# Patient Record
Sex: MALE | Race: WHITE | Employment: FULL TIME | URBAN - METROPOLITAN AREA
[De-identification: names, ages, dates, MRNs, and addresses within clinical notes are randomized per-mention and may not be internally consistent; named-entity substitution may affect disease eponyms.]

---

## 2017-02-03 ENCOUNTER — TELEPHONE (OUTPATIENT)
Dept: FAMILY MEDICINE CLINIC | Age: 62
End: 2017-02-03

## 2017-02-03 DIAGNOSIS — M25.539 PAIN IN WRIST, UNSPECIFIED LATERALITY: Primary | ICD-10-CM

## 2017-02-03 DIAGNOSIS — M67.40 GANGLION CYST: ICD-10-CM

## 2017-02-06 DIAGNOSIS — M25.531 PAIN IN BOTH WRISTS: Primary | ICD-10-CM

## 2017-02-06 DIAGNOSIS — M67.40 GANGLION CYST: ICD-10-CM

## 2017-02-06 DIAGNOSIS — M25.532 PAIN IN BOTH WRISTS: Primary | ICD-10-CM

## 2017-02-15 ENCOUNTER — OFFICE VISIT (OUTPATIENT)
Dept: ORTHOPEDIC SURGERY | Age: 62
End: 2017-02-15

## 2017-02-15 VITALS — BODY MASS INDEX: 27.34 KG/M2 | HEIGHT: 74 IN | WEIGHT: 213 LBS

## 2017-02-15 DIAGNOSIS — M19.049 CMC ARTHRITIS: ICD-10-CM

## 2017-02-15 DIAGNOSIS — G56.03 CARPAL TUNNEL SYNDROME, BILATERAL: ICD-10-CM

## 2017-02-15 DIAGNOSIS — M79.642 HAND PAIN, LEFT: ICD-10-CM

## 2017-02-15 DIAGNOSIS — M19.132 SLAC (SCAPHOLUNATE ADVANCED COLLAPSE) OF WRIST, LEFT: ICD-10-CM

## 2017-02-15 DIAGNOSIS — M79.641 RIGHT HAND PAIN: Primary | ICD-10-CM

## 2017-02-15 PROBLEM — M19.139 SLAC (SCAPHOLUNATE ADVANCED COLLAPSE) OF WRIST: Status: ACTIVE | Noted: 2017-02-15

## 2017-02-15 PROCEDURE — 73130 X-RAY EXAM OF HAND: CPT | Performed by: ORTHOPAEDIC SURGERY

## 2017-02-15 PROCEDURE — 99204 OFFICE O/P NEW MOD 45 MIN: CPT | Performed by: ORTHOPAEDIC SURGERY

## 2017-02-24 ENCOUNTER — OFFICE VISIT (OUTPATIENT)
Dept: ORTHOPEDIC SURGERY | Age: 62
End: 2017-02-24

## 2017-02-24 DIAGNOSIS — G56.03 BILATERAL CARPAL TUNNEL SYNDROME: Primary | ICD-10-CM

## 2017-02-24 PROCEDURE — 95909 NRV CNDJ TST 5-6 STUDIES: CPT | Performed by: PHYSICAL MEDICINE & REHABILITATION

## 2017-02-24 PROCEDURE — 95886 MUSC TEST DONE W/N TEST COMP: CPT | Performed by: PHYSICAL MEDICINE & REHABILITATION

## 2017-03-01 ENCOUNTER — OFFICE VISIT (OUTPATIENT)
Dept: ORTHOPEDIC SURGERY | Age: 62
End: 2017-03-01

## 2017-03-01 VITALS
BODY MASS INDEX: 27.59 KG/M2 | SYSTOLIC BLOOD PRESSURE: 129 MMHG | HEART RATE: 70 BPM | DIASTOLIC BLOOD PRESSURE: 78 MMHG | HEIGHT: 74 IN | WEIGHT: 215 LBS

## 2017-03-01 DIAGNOSIS — M19.039 WRIST ARTHRITIS: ICD-10-CM

## 2017-03-01 DIAGNOSIS — G56.03 CARPAL TUNNEL SYNDROME, BILATERAL: Primary | ICD-10-CM

## 2017-03-01 PROCEDURE — 99213 OFFICE O/P EST LOW 20 MIN: CPT | Performed by: ORTHOPAEDIC SURGERY

## 2017-03-02 ENCOUNTER — TELEPHONE (OUTPATIENT)
Dept: ORTHOPEDIC SURGERY | Age: 62
End: 2017-03-02

## 2017-03-03 ENCOUNTER — OFFICE VISIT (OUTPATIENT)
Dept: FAMILY MEDICINE CLINIC | Age: 62
End: 2017-03-03

## 2017-03-03 VITALS
HEIGHT: 73 IN | HEART RATE: 60 BPM | TEMPERATURE: 98.4 F | WEIGHT: 212 LBS | RESPIRATION RATE: 16 BRPM | OXYGEN SATURATION: 98 % | SYSTOLIC BLOOD PRESSURE: 122 MMHG | DIASTOLIC BLOOD PRESSURE: 79 MMHG | BODY MASS INDEX: 28.1 KG/M2

## 2017-03-03 DIAGNOSIS — M79.641 RIGHT HAND PAIN: Primary | ICD-10-CM

## 2017-03-03 DIAGNOSIS — J30.89 PERENNIAL ALLERGIC RHINITIS, UNSPECIFIED ALLERGIC RHINITIS TRIGGER: ICD-10-CM

## 2017-03-03 DIAGNOSIS — Z01.818 PREOP EXAMINATION: ICD-10-CM

## 2017-03-03 DIAGNOSIS — M79.642 LEFT HAND PAIN: ICD-10-CM

## 2017-03-03 DIAGNOSIS — G56.01 RIGHT CARPAL TUNNEL SYNDROME: ICD-10-CM

## 2017-03-03 PROCEDURE — 93000 ELECTROCARDIOGRAM COMPLETE: CPT | Performed by: NURSE PRACTITIONER

## 2017-03-03 PROCEDURE — 99243 OFF/OP CNSLTJ NEW/EST LOW 30: CPT | Performed by: NURSE PRACTITIONER

## 2017-03-03 ASSESSMENT — PATIENT HEALTH QUESTIONNAIRE - PHQ9
SUM OF ALL RESPONSES TO PHQ9 QUESTIONS 1 & 2: 0
SUM OF ALL RESPONSES TO PHQ QUESTIONS 1-9: 0
2. FEELING DOWN, DEPRESSED OR HOPELESS: 0
1. LITTLE INTEREST OR PLEASURE IN DOING THINGS: 0

## 2017-03-03 ASSESSMENT — ENCOUNTER SYMPTOMS
CONSTIPATION: 0
EYE ITCHING: 0
CHEST TIGHTNESS: 0
WHEEZING: 0
TROUBLE SWALLOWING: 0
SORE THROAT: 0
EYE REDNESS: 0
ABDOMINAL PAIN: 0
DIARRHEA: 0
COLOR CHANGE: 0
NAUSEA: 0
COUGH: 0
SHORTNESS OF BREATH: 0
SINUS PRESSURE: 1

## 2017-03-14 ENCOUNTER — HOSPITAL ENCOUNTER (OUTPATIENT)
Dept: SURGERY | Age: 62
Discharge: HOME | End: 2017-03-14
Attending: ORTHOPAEDIC SURGERY | Admitting: ORTHOPAEDIC SURGERY

## 2017-03-14 VITALS
TEMPERATURE: 97.5 F | BODY MASS INDEX: 27.59 KG/M2 | WEIGHT: 215 LBS | OXYGEN SATURATION: 98 % | RESPIRATION RATE: 18 BRPM | SYSTOLIC BLOOD PRESSURE: 126 MMHG | DIASTOLIC BLOOD PRESSURE: 84 MMHG | HEART RATE: 53 BPM | HEIGHT: 74 IN

## 2017-03-14 RX ORDER — HYDRALAZINE HYDROCHLORIDE 20 MG/ML
5 INJECTION INTRAMUSCULAR; INTRAVENOUS
Status: DISCONTINUED | OUTPATIENT
Start: 2017-03-14 | End: 2017-03-15 | Stop reason: HOSPADM

## 2017-03-14 RX ORDER — SODIUM CHLORIDE, SODIUM LACTATE, POTASSIUM CHLORIDE, CALCIUM CHLORIDE 600; 310; 30; 20 MG/100ML; MG/100ML; MG/100ML; MG/100ML
INJECTION, SOLUTION INTRAVENOUS CONTINUOUS
Status: DISCONTINUED | OUTPATIENT
Start: 2017-03-14 | End: 2017-03-15 | Stop reason: HOSPADM

## 2017-03-14 RX ORDER — MEPERIDINE HYDROCHLORIDE 50 MG/ML
12.5 INJECTION INTRAMUSCULAR; INTRAVENOUS; SUBCUTANEOUS EVERY 5 MIN PRN
Status: DISCONTINUED | OUTPATIENT
Start: 2017-03-14 | End: 2017-03-15 | Stop reason: HOSPADM

## 2017-03-14 RX ORDER — LABETALOL HYDROCHLORIDE 5 MG/ML
5 INJECTION, SOLUTION INTRAVENOUS EVERY 10 MIN PRN
Status: DISCONTINUED | OUTPATIENT
Start: 2017-03-14 | End: 2017-03-15 | Stop reason: HOSPADM

## 2017-03-14 RX ORDER — OXYCODONE HYDROCHLORIDE AND ACETAMINOPHEN 5; 325 MG/1; MG/1
2 TABLET ORAL PRN
Status: ACTIVE | OUTPATIENT
Start: 2017-03-14 | End: 2017-03-14

## 2017-03-14 RX ORDER — DIPHENHYDRAMINE HYDROCHLORIDE 50 MG/ML
12.5 INJECTION INTRAMUSCULAR; INTRAVENOUS
Status: ACTIVE | OUTPATIENT
Start: 2017-03-14 | End: 2017-03-14

## 2017-03-14 RX ORDER — MORPHINE SULFATE 10 MG/ML
2 INJECTION, SOLUTION INTRAMUSCULAR; INTRAVENOUS EVERY 5 MIN PRN
Status: DISCONTINUED | OUTPATIENT
Start: 2017-03-14 | End: 2017-03-15 | Stop reason: HOSPADM

## 2017-03-14 RX ORDER — OXYCODONE HYDROCHLORIDE AND ACETAMINOPHEN 5; 325 MG/1; MG/1
1 TABLET ORAL PRN
Status: ACTIVE | OUTPATIENT
Start: 2017-03-14 | End: 2017-03-14

## 2017-03-14 RX ORDER — ONDANSETRON 2 MG/ML
4 INJECTION INTRAMUSCULAR; INTRAVENOUS
Status: ACTIVE | OUTPATIENT
Start: 2017-03-14 | End: 2017-03-14

## 2017-03-14 RX ORDER — MORPHINE SULFATE 2 MG/ML
1 INJECTION, SOLUTION INTRAMUSCULAR; INTRAVENOUS EVERY 5 MIN PRN
Status: DISCONTINUED | OUTPATIENT
Start: 2017-03-14 | End: 2017-03-15 | Stop reason: HOSPADM

## 2017-03-14 RX ADMIN — SODIUM CHLORIDE, SODIUM LACTATE, POTASSIUM CHLORIDE, CALCIUM CHLORIDE: 600; 310; 30; 20 INJECTION, SOLUTION INTRAVENOUS at 10:40

## 2017-03-14 ASSESSMENT — PAIN SCALES - GENERAL
PAINLEVEL_OUTOF10: 0

## 2017-03-14 ASSESSMENT — PAIN - FUNCTIONAL ASSESSMENT: PAIN_FUNCTIONAL_ASSESSMENT: 0-10

## 2017-03-22 ENCOUNTER — OFFICE VISIT (OUTPATIENT)
Dept: ORTHOPEDIC SURGERY | Age: 62
End: 2017-03-22

## 2017-03-22 VITALS — BODY MASS INDEX: 27.59 KG/M2 | WEIGHT: 215 LBS | HEIGHT: 74 IN

## 2017-03-22 DIAGNOSIS — G56.03 CARPAL TUNNEL SYNDROME, BILATERAL: Primary | ICD-10-CM

## 2017-03-22 DIAGNOSIS — M67.432 GANGLION CYST OF WRIST, LEFT: ICD-10-CM

## 2017-03-22 PROCEDURE — 99213 OFFICE O/P EST LOW 20 MIN: CPT | Performed by: ORTHOPAEDIC SURGERY

## 2017-03-29 ENCOUNTER — TELEPHONE (OUTPATIENT)
Dept: ORTHOPEDIC SURGERY | Age: 62
End: 2017-03-29

## 2017-04-11 ENCOUNTER — HOSPITAL ENCOUNTER (OUTPATIENT)
Dept: SURGERY | Age: 62
Discharge: HOME | End: 2017-04-11
Attending: ORTHOPAEDIC SURGERY | Admitting: ORTHOPAEDIC SURGERY

## 2017-04-11 VITALS
HEART RATE: 59 BPM | TEMPERATURE: 97.8 F | HEIGHT: 74 IN | WEIGHT: 220 LBS | SYSTOLIC BLOOD PRESSURE: 135 MMHG | OXYGEN SATURATION: 98 % | BODY MASS INDEX: 28.23 KG/M2 | RESPIRATION RATE: 15 BRPM | DIASTOLIC BLOOD PRESSURE: 92 MMHG

## 2017-04-11 RX ORDER — ONDANSETRON 2 MG/ML
4 INJECTION INTRAMUSCULAR; INTRAVENOUS EVERY 30 MIN PRN
Status: DISCONTINUED | OUTPATIENT
Start: 2017-04-11 | End: 2017-04-12 | Stop reason: HOSPADM

## 2017-04-11 RX ORDER — DIPHENHYDRAMINE HYDROCHLORIDE 50 MG/ML
6.25 INJECTION INTRAMUSCULAR; INTRAVENOUS
Status: ACTIVE | OUTPATIENT
Start: 2017-04-11 | End: 2017-04-11

## 2017-04-11 RX ORDER — OXYCODONE HYDROCHLORIDE AND ACETAMINOPHEN 5; 325 MG/1; MG/1
1 TABLET ORAL PRN
Status: ACTIVE | OUTPATIENT
Start: 2017-04-11 | End: 2017-04-11

## 2017-04-11 RX ORDER — OXYCODONE HYDROCHLORIDE AND ACETAMINOPHEN 5; 325 MG/1; MG/1
2 TABLET ORAL PRN
Status: ACTIVE | OUTPATIENT
Start: 2017-04-11 | End: 2017-04-11

## 2017-04-11 RX ORDER — MEPERIDINE HYDROCHLORIDE 50 MG/ML
12.5 INJECTION INTRAMUSCULAR; INTRAVENOUS; SUBCUTANEOUS EVERY 5 MIN PRN
Status: DISCONTINUED | OUTPATIENT
Start: 2017-04-11 | End: 2017-04-12 | Stop reason: HOSPADM

## 2017-04-11 RX ORDER — HYDRALAZINE HYDROCHLORIDE 20 MG/ML
5 INJECTION INTRAMUSCULAR; INTRAVENOUS EVERY 30 MIN PRN
Status: DISCONTINUED | OUTPATIENT
Start: 2017-04-11 | End: 2017-04-12 | Stop reason: HOSPADM

## 2017-04-11 RX ORDER — LABETALOL HYDROCHLORIDE 5 MG/ML
5 INJECTION, SOLUTION INTRAVENOUS
Status: DISCONTINUED | OUTPATIENT
Start: 2017-04-11 | End: 2017-04-12 | Stop reason: HOSPADM

## 2017-04-11 RX ORDER — SODIUM CHLORIDE, SODIUM LACTATE, POTASSIUM CHLORIDE, CALCIUM CHLORIDE 600; 310; 30; 20 MG/100ML; MG/100ML; MG/100ML; MG/100ML
INJECTION, SOLUTION INTRAVENOUS CONTINUOUS
Status: DISCONTINUED | OUTPATIENT
Start: 2017-04-11 | End: 2017-04-12 | Stop reason: HOSPADM

## 2017-04-11 RX ADMIN — SODIUM CHLORIDE, SODIUM LACTATE, POTASSIUM CHLORIDE, CALCIUM CHLORIDE: 600; 310; 30; 20 INJECTION, SOLUTION INTRAVENOUS at 09:52

## 2017-04-11 ASSESSMENT — PAIN - FUNCTIONAL ASSESSMENT: PAIN_FUNCTIONAL_ASSESSMENT: 0-10

## 2017-04-19 ENCOUNTER — OFFICE VISIT (OUTPATIENT)
Dept: ORTHOPEDIC SURGERY | Age: 62
End: 2017-04-19

## 2017-04-19 ENCOUNTER — HOSPITAL ENCOUNTER (OUTPATIENT)
Dept: OCCUPATIONAL THERAPY | Age: 62
Discharge: HOME | End: 2017-04-30
Admitting: ORTHOPAEDIC SURGERY

## 2017-04-19 VITALS
WEIGHT: 220.02 LBS | HEART RATE: 69 BPM | DIASTOLIC BLOOD PRESSURE: 90 MMHG | BODY MASS INDEX: 28.24 KG/M2 | SYSTOLIC BLOOD PRESSURE: 137 MMHG | HEIGHT: 74 IN

## 2017-04-19 DIAGNOSIS — G56.02 CARPAL TUNNEL SYNDROME ON LEFT: ICD-10-CM

## 2017-04-19 DIAGNOSIS — M67.432 GANGLION OF WRIST, LEFT: Primary | ICD-10-CM

## 2017-04-19 PROCEDURE — 99024 POSTOP FOLLOW-UP VISIT: CPT | Performed by: ORTHOPAEDIC SURGERY

## 2017-04-19 RX ORDER — CEPHALEXIN 500 MG/1
500 CAPSULE ORAL 4 TIMES DAILY
Qty: 28 CAPSULE | Refills: 0 | Status: SHIPPED | OUTPATIENT
Start: 2017-04-19 | End: 2017-06-12 | Stop reason: ALTCHOICE

## 2017-04-24 ENCOUNTER — OFFICE VISIT (OUTPATIENT)
Dept: ORTHOPEDIC SURGERY | Age: 62
End: 2017-04-24

## 2017-04-24 DIAGNOSIS — G56.03 CARPAL TUNNEL SYNDROME, BILATERAL: Primary | ICD-10-CM

## 2017-04-24 PROCEDURE — 99024 POSTOP FOLLOW-UP VISIT: CPT | Performed by: ORTHOPAEDIC SURGERY

## 2017-05-15 ENCOUNTER — OFFICE VISIT (OUTPATIENT)
Dept: ORTHOPEDIC SURGERY | Age: 62
End: 2017-05-15

## 2017-05-15 VITALS — HEIGHT: 74 IN | BODY MASS INDEX: 28.24 KG/M2 | WEIGHT: 220.02 LBS

## 2017-05-15 DIAGNOSIS — M19.132 SLAC (SCAPHOLUNATE ADVANCED COLLAPSE) WRIST, LEFT: Primary | ICD-10-CM

## 2017-05-15 PROCEDURE — 99213 OFFICE O/P EST LOW 20 MIN: CPT | Performed by: ORTHOPAEDIC SURGERY

## 2017-06-09 DIAGNOSIS — Z00.00 ROUTINE MEDICAL EXAM: ICD-10-CM

## 2017-06-09 DIAGNOSIS — Z12.5 SCREENING FOR PROSTATE CANCER: ICD-10-CM

## 2017-06-09 DIAGNOSIS — Z12.5 SCREENING FOR PROSTATE CANCER: Primary | ICD-10-CM

## 2017-06-09 LAB
A/G RATIO: 1.4 (ref 1.1–2.2)
ALBUMIN SERPL-MCNC: 4.1 G/DL (ref 3.4–5)
ALP BLD-CCNC: 85 U/L (ref 40–129)
ALT SERPL-CCNC: 7 U/L (ref 10–40)
ANION GAP SERPL CALCULATED.3IONS-SCNC: 15 MMOL/L (ref 3–16)
AST SERPL-CCNC: 15 U/L (ref 15–37)
BASOPHILS ABSOLUTE: 0 K/UL (ref 0–0.2)
BASOPHILS RELATIVE PERCENT: 0.8 %
BILIRUB SERPL-MCNC: 0.4 MG/DL (ref 0–1)
BILIRUBIN DIRECT: <0.2 MG/DL (ref 0–0.3)
BILIRUBIN, INDIRECT: NORMAL MG/DL (ref 0–1)
BUN BLDV-MCNC: 17 MG/DL (ref 7–20)
CALCIUM SERPL-MCNC: 8.7 MG/DL (ref 8.3–10.6)
CHLORIDE BLD-SCNC: 104 MMOL/L (ref 99–110)
CHOLESTEROL, TOTAL: 188 MG/DL (ref 0–199)
CO2: 26 MMOL/L (ref 21–32)
CREAT SERPL-MCNC: 0.9 MG/DL (ref 0.8–1.3)
EOSINOPHILS ABSOLUTE: 0.2 K/UL (ref 0–0.6)
EOSINOPHILS RELATIVE PERCENT: 4.1 %
GFR AFRICAN AMERICAN: >60
GFR NON-AFRICAN AMERICAN: >60
GLOBULIN: 2.9 G/DL
GLUCOSE BLD-MCNC: 91 MG/DL (ref 70–99)
HCT VFR BLD CALC: 47 % (ref 40.5–52.5)
HDLC SERPL-MCNC: 63 MG/DL (ref 40–60)
HEMOGLOBIN: 15.5 G/DL (ref 13.5–17.5)
LDL CHOLESTEROL CALCULATED: 106 MG/DL
LYMPHOCYTES ABSOLUTE: 1.7 K/UL (ref 1–5.1)
LYMPHOCYTES RELATIVE PERCENT: 33.9 %
MCH RBC QN AUTO: 30.6 PG (ref 26–34)
MCHC RBC AUTO-ENTMCNC: 33 G/DL (ref 31–36)
MCV RBC AUTO: 92.8 FL (ref 80–100)
MONOCYTES ABSOLUTE: 0.4 K/UL (ref 0–1.3)
MONOCYTES RELATIVE PERCENT: 7.9 %
NEUTROPHILS ABSOLUTE: 2.7 K/UL (ref 1.7–7.7)
NEUTROPHILS RELATIVE PERCENT: 53.3 %
PDW BLD-RTO: 13 % (ref 12.4–15.4)
PLATELET # BLD: 332 K/UL (ref 135–450)
PMV BLD AUTO: 7.7 FL (ref 5–10.5)
POTASSIUM SERPL-SCNC: 4.4 MMOL/L (ref 3.5–5.1)
PROSTATE SPECIFIC ANTIGEN: 0.72 NG/ML (ref 0–4)
RBC # BLD: 5.06 M/UL (ref 4.2–5.9)
SODIUM BLD-SCNC: 145 MMOL/L (ref 136–145)
TOTAL PROTEIN: 7 G/DL (ref 6.4–8.2)
TRIGL SERPL-MCNC: 95 MG/DL (ref 0–150)
TSH REFLEX: 2.4 UIU/ML (ref 0.27–4.2)
VITAMIN D 25-HYDROXY: 54.6 NG/ML
VLDLC SERPL CALC-MCNC: 19 MG/DL
WBC # BLD: 5.2 K/UL (ref 4–11)

## 2017-06-12 ENCOUNTER — OFFICE VISIT (OUTPATIENT)
Dept: FAMILY MEDICINE CLINIC | Age: 62
End: 2017-06-12

## 2017-06-12 VITALS
HEART RATE: 56 BPM | BODY MASS INDEX: 29.12 KG/M2 | TEMPERATURE: 98.4 F | SYSTOLIC BLOOD PRESSURE: 134 MMHG | HEIGHT: 72 IN | DIASTOLIC BLOOD PRESSURE: 89 MMHG | WEIGHT: 215 LBS | RESPIRATION RATE: 16 BRPM

## 2017-06-12 DIAGNOSIS — Z00.00 ROUTINE GENERAL MEDICAL EXAMINATION AT A HEALTH CARE FACILITY: Primary | ICD-10-CM

## 2017-06-12 LAB
BILIRUBIN, POC: NORMAL
BLOOD URINE, POC: NORMAL
CLARITY, POC: NORMAL
COLOR, POC: NORMAL
GLUCOSE URINE, POC: NORMAL
KETONES, POC: NORMAL
LEUKOCYTE EST, POC: NORMAL
NITRITE, POC: NORMAL
PH, POC: 7
PROTEIN, POC: NORMAL
SPECIFIC GRAVITY, POC: 1.01
UROBILINOGEN, POC: NORMAL

## 2017-06-12 PROCEDURE — 81003 URINALYSIS AUTO W/O SCOPE: CPT | Performed by: FAMILY MEDICINE

## 2017-06-12 PROCEDURE — 99396 PREV VISIT EST AGE 40-64: CPT | Performed by: FAMILY MEDICINE

## 2017-06-12 ASSESSMENT — ENCOUNTER SYMPTOMS
PHOTOPHOBIA: 0
CONSTIPATION: 0
SORE THROAT: 0
BACK PAIN: 0
BLOOD IN STOOL: 0
TROUBLE SWALLOWING: 0
ABDOMINAL PAIN: 0
WHEEZING: 0
SINUS PRESSURE: 0
SHORTNESS OF BREATH: 0
DIARRHEA: 0
COUGH: 0

## 2017-08-21 ENCOUNTER — OFFICE VISIT (OUTPATIENT)
Dept: ORTHOPEDIC SURGERY | Age: 62
End: 2017-08-21

## 2017-08-21 VITALS — WEIGHT: 214.95 LBS | HEIGHT: 72 IN | BODY MASS INDEX: 29.11 KG/M2

## 2017-08-21 DIAGNOSIS — M25.532 LEFT WRIST PAIN: Primary | ICD-10-CM

## 2017-08-21 DIAGNOSIS — M19.132 SLAC (SCAPHOLUNATE ADVANCED COLLAPSE) OF WRIST, LEFT: ICD-10-CM

## 2017-08-21 PROCEDURE — 73110 X-RAY EXAM OF WRIST: CPT | Performed by: ORTHOPAEDIC SURGERY

## 2017-08-21 PROCEDURE — 99213 OFFICE O/P EST LOW 20 MIN: CPT | Performed by: ORTHOPAEDIC SURGERY

## 2017-11-24 ENCOUNTER — TELEPHONE (OUTPATIENT)
Dept: ORTHOPEDIC SURGERY | Age: 62
End: 2017-11-24

## 2017-11-28 ENCOUNTER — OFFICE VISIT (OUTPATIENT)
Dept: FAMILY MEDICINE CLINIC | Age: 62
End: 2017-11-28

## 2017-11-28 VITALS
HEART RATE: 56 BPM | WEIGHT: 212 LBS | TEMPERATURE: 98.6 F | RESPIRATION RATE: 16 BRPM | SYSTOLIC BLOOD PRESSURE: 133 MMHG | DIASTOLIC BLOOD PRESSURE: 90 MMHG | BODY MASS INDEX: 28.71 KG/M2

## 2017-11-28 DIAGNOSIS — M19.032 OSTEOARTHRITIS OF LEFT WRIST, UNSPECIFIED OSTEOARTHRITIS TYPE: Primary | ICD-10-CM

## 2017-11-28 DIAGNOSIS — Z01.818 PRE-OP EXAM: ICD-10-CM

## 2017-11-28 PROCEDURE — 99214 OFFICE O/P EST MOD 30 MIN: CPT | Performed by: FAMILY MEDICINE

## 2017-11-28 PROCEDURE — 93000 ELECTROCARDIOGRAM COMPLETE: CPT | Performed by: FAMILY MEDICINE

## 2017-11-28 ASSESSMENT — ENCOUNTER SYMPTOMS
SHORTNESS OF BREATH: 0
ABDOMINAL PAIN: 0
TROUBLE SWALLOWING: 0
BACK PAIN: 0
SORE THROAT: 0
PHOTOPHOBIA: 0
SINUS PRESSURE: 0
CONSTIPATION: 0
WHEEZING: 0
DIARRHEA: 0
COUGH: 0
BLOOD IN STOOL: 0

## 2017-11-28 NOTE — PROGRESS NOTES
no distension, no abdominal bruit and no mass. There is no hepatosplenomegaly. There is no tenderness. Musculoskeletal: He exhibits no edema. Left wrist pain and tenderness over carpel bones. Lymphadenopathy:     He has no cervical adenopathy. Neurological: He is alert and oriented to person, place, and time. No cranial nerve deficit. Skin: Skin is warm and dry. Psychiatric: He has a normal mood and affect. His behavior is normal. Judgment and thought content normal.   Vitals reviewed. Assessment:        Pre-op exam    ligament injury wrist        Plan:      EKG OK      Medically stable. OK for the surgery        Prior to Visit Medications    Medication Sig Taking? Authorizing Provider   LUTEIN PO Take by mouth Yes Historical Provider, MD   Multiple Vitamins-Minerals (VITEYES AREDS FORMULA PO) Take by mouth Yes Historical Provider, MD   Omega-3 Fatty Acids (OMEGA 3 PO) Take by mouth daily  Yes Historical Provider, MD   calcium carbonate (OSCAL) 500 MG TABS tablet Take 500 mg by mouth daily.  Yes Historical Provider, MD   Glucosamine-Chondroitin (GLUCOSAMINE CHONDR COMPLEX PO) Take by mouth daily  Yes Historical Provider, MD

## 2017-12-01 ENCOUNTER — HOSPITAL ENCOUNTER (OUTPATIENT)
Dept: OCCUPATIONAL THERAPY | Age: 62
Discharge: HOME | End: 2017-12-31
Attending: ORTHOPAEDIC SURGERY | Admitting: ORTHOPAEDIC SURGERY

## 2017-12-12 ENCOUNTER — HOSPITAL ENCOUNTER (OUTPATIENT)
Dept: SURGERY | Age: 62
Discharge: HOME | End: 2017-12-12
Attending: ORTHOPAEDIC SURGERY | Admitting: ORTHOPAEDIC SURGERY

## 2017-12-12 VITALS
WEIGHT: 215 LBS | RESPIRATION RATE: 15 BRPM | BODY MASS INDEX: 27.59 KG/M2 | SYSTOLIC BLOOD PRESSURE: 146 MMHG | HEART RATE: 53 BPM | OXYGEN SATURATION: 98 % | DIASTOLIC BLOOD PRESSURE: 91 MMHG | TEMPERATURE: 97.4 F | HEIGHT: 74 IN

## 2017-12-12 RX ORDER — SODIUM CHLORIDE, SODIUM LACTATE, POTASSIUM CHLORIDE, CALCIUM CHLORIDE 600; 310; 30; 20 MG/100ML; MG/100ML; MG/100ML; MG/100ML
INJECTION, SOLUTION INTRAVENOUS CONTINUOUS
Status: DISCONTINUED | OUTPATIENT
Start: 2017-12-12 | End: 2017-12-13 | Stop reason: HOSPADM

## 2017-12-12 RX ORDER — HYDRALAZINE HYDROCHLORIDE 20 MG/ML
5 INJECTION INTRAMUSCULAR; INTRAVENOUS EVERY 10 MIN PRN
Status: DISCONTINUED | OUTPATIENT
Start: 2017-12-12 | End: 2017-12-13 | Stop reason: HOSPADM

## 2017-12-12 RX ORDER — ONDANSETRON 2 MG/ML
4 INJECTION INTRAMUSCULAR; INTRAVENOUS EVERY 10 MIN PRN
Status: DISCONTINUED | OUTPATIENT
Start: 2017-12-12 | End: 2017-12-13 | Stop reason: HOSPADM

## 2017-12-12 RX ORDER — LABETALOL HYDROCHLORIDE 5 MG/ML
5 INJECTION, SOLUTION INTRAVENOUS EVERY 10 MIN PRN
Status: DISCONTINUED | OUTPATIENT
Start: 2017-12-12 | End: 2017-12-13 | Stop reason: HOSPADM

## 2017-12-12 RX ORDER — MIDAZOLAM HYDROCHLORIDE 1 MG/ML
INJECTION INTRAMUSCULAR; INTRAVENOUS
Status: DISPENSED
Start: 2017-12-12 | End: 2017-12-12

## 2017-12-12 RX ORDER — OXYCODONE HYDROCHLORIDE AND ACETAMINOPHEN 5; 325 MG/1; MG/1
1 TABLET ORAL PRN
Status: ACTIVE | OUTPATIENT
Start: 2017-12-12 | End: 2017-12-12

## 2017-12-12 RX ORDER — MEPERIDINE HYDROCHLORIDE 50 MG/ML
12.5 INJECTION INTRAMUSCULAR; INTRAVENOUS; SUBCUTANEOUS EVERY 5 MIN PRN
Status: DISCONTINUED | OUTPATIENT
Start: 2017-12-12 | End: 2017-12-13 | Stop reason: HOSPADM

## 2017-12-12 RX ORDER — OXYCODONE HYDROCHLORIDE AND ACETAMINOPHEN 5; 325 MG/1; MG/1
2 TABLET ORAL PRN
Status: ACTIVE | OUTPATIENT
Start: 2017-12-12 | End: 2017-12-12

## 2017-12-12 RX ADMIN — SODIUM CHLORIDE, SODIUM LACTATE, POTASSIUM CHLORIDE, CALCIUM CHLORIDE: 600; 310; 30; 20 INJECTION, SOLUTION INTRAVENOUS at 06:15

## 2017-12-12 ASSESSMENT — PAIN SCALES - GENERAL
PAINLEVEL_OUTOF10: 3
PAINLEVEL_OUTOF10: 0

## 2017-12-12 ASSESSMENT — PAIN - FUNCTIONAL ASSESSMENT: PAIN_FUNCTIONAL_ASSESSMENT: 0-10

## 2017-12-12 ASSESSMENT — PAIN DESCRIPTION - PAIN TYPE: TYPE: SURGICAL PAIN

## 2017-12-12 ASSESSMENT — PAIN DESCRIPTION - DESCRIPTORS: DESCRIPTORS: DISCOMFORT

## 2017-12-12 ASSESSMENT — PAIN DESCRIPTION - ORIENTATION: ORIENTATION: LEFT

## 2017-12-12 ASSESSMENT — PAIN DESCRIPTION - LOCATION: LOCATION: HAND

## 2017-12-12 NOTE — ANESTHESIA PRE-OP
Department of Anesthesiology  Preprocedure Note       Name:  Lang Gong   Age:  58 y.o.  :  1955                                          MRN:  [id number]         Date:  2017      Surgeon:    Procedure:    Medications prior to admission:   Prior to Admission medications    Medication Sig Start Date End Date Taking? Authorizing Provider   LUTEIN PO Take by mouth    Historical Provider, MD   Multiple Vitamins-Minerals (VITEYES AREDS FORMULA PO) Take by mouth    Historical Provider, MD   Omega-3 Fatty Acids (OMEGA 3 PO) Take by mouth daily     Historical Provider, MD   calcium carbonate (OSCAL) 500 MG TABS tablet Take 500 mg by mouth daily. Historical Provider, MD   Glucosamine-Chondroitin (GLUCOSAMINE CHONDR COMPLEX PO) Take by mouth daily     Historical Provider, MD       Current medications:    Current Outpatient Prescriptions   Medication Sig Dispense Refill    LUTEIN PO Take by mouth      Multiple Vitamins-Minerals (VITEYES AREDS FORMULA PO) Take by mouth      Omega-3 Fatty Acids (OMEGA 3 PO) Take by mouth daily       calcium carbonate (OSCAL) 500 MG TABS tablet Take 500 mg by mouth daily.  Glucosamine-Chondroitin (GLUCOSAMINE CHONDR COMPLEX PO) Take by mouth daily        No current facility-administered medications for this encounter. Allergies:     Allergies   Allergen Reactions    Naprosyn [Naproxen] Rash       Problem List:    Patient Active Problem List   Diagnosis Code    Pain in joint, lower leg left M25.569    Pain in joint, shoulder region left M25.519    Neck pain M54.2    Neck arthritis C5 6 and 7 M46.92    Tendinitis of left rotator cuff M75.82    Left rotator cuff tear M75.102    Status post right hip replacement  Z96.641    Left knee DJD M17.12    Chondromalacia patellae of left knee M22.42    Arthritis of left acromioclavicular joint M19.012    Tear of lateral meniscus of left knee S83.282A    Acquired valgus deformity left knee M21.069    Tear of medial meniscus of left knee S83.242A    Biceps tendinitis on left M75.22    Labral tear of left shoulder S43.439A    S/P left knee arthroscopy, chondroplasty, synovectomy, removal loose bodies, and partial medial and lateral meniscectomy 8/20/2014 Z98.890    S/P left rotator cuff repair, arthroscopy, debridement of rotator cuff, Taylor, Neer, and biceps tenotomy 10/28/2014 Z98.890    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    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    Carpal tunnel syndrome, bilateral G56.03    Wrist arthritis M19.039    Perennial allergic rhinitis J30.89       Past Medical History:        Diagnosis Date    Allergic rhinitis     Arthritis     Osteoarthritis        Past Surgical History:        Procedure Laterality Date    BONE GRAFT      rt wrist  age 27   [facility] [address] Right 03/14/2017    CARPAL TUNNEL RELEASE Left 04/11/2017    with excision volar wrist ganglion    COLONOSCOPY  2008    no polyps    JOINT REPLACEMENT  2008    rt total hip    KNEE ARTHROSCOPY Left 8/20/2014    LEFT KNEE ARTHROSCOPY, CHONDROPLASTY, SYNOVECTOMY, PARTIAL    SHOULDER ARTHROSCOPY Left 10/28/14    debridement,rotator cuff repair    TONSILLECTOMY AND ADENOIDECTOMY Bilateral 1960       Social History:    Social History   Substance Use Topics    Smoking status: Never Smoker    Smokeless tobacco: Never Used    Alcohol use 0.0 oz/week      Comment: 1-3 beers a week. Counseling given: Not Answered      Vital Signs (Current): There were no vitals filed for this visit.                                            BP Readings from Last 3 Encounters:   11/28/17 (!) 133/90   06/12/17 134/89   04/19/17 (!) 137/90       NPO Status:                                                                                 BMI:   Wt Readings from Last 3 Encounters:   12/07/17 215 lb (97.5 kg)   11/28/17 212 lb (96.2 kg)   08/21/17 214 lb 15.2 oz (97.5 kg)     There is no height or weight on file to calculate BMI. Anesthesia Evaluation  Patient summary reviewed no history of anesthetic complications:   Airway: Mallampati: II  TM distance: >3 FB   Neck ROM: full  Mouth opening: > = 3 FB Dental:          Pulmonary:Negative Pulmonary ROS                              Cardiovascular:Negative CV ROS                      Neuro/Psych:   (+) neuromuscular disease:,             GI/Hepatic/Renal: Neg GI/Hepatic/Renal ROS       (-) GERD, liver disease and no renal disease       Endo/Other: Negative Endo/Other ROS       (-) no Type II DM               Abdominal:           Vascular: negative vascular ROS. Anesthesia Plan      general and regional     ASA 2     (Supraclavicular block for post op pain.)  Induction: intravenous. MIPS: Postoperative opioids intended and Prophylactic antiemetics administered. Anesthetic plan and risks discussed with patient and spouse. Plan discussed with CRNA. All questions answered and agrees with plan.         Orly Duffy MD   12/12/2017

## 2017-12-12 NOTE — PROCEDURES
[facility]   79/71/6167  58 y.o. ULTRASOUND GUIDED SUPRACLAVICULAR BRACHIAL PLEXUS   NERVE BLOCK   Discussed with patient risks, benefits and alternatives of block (including but not limited to infection, bleeding, and damage to nerves) all questions answered patient agrees with planned procedure  Surgical procedure: hand  Side: left  Requested by Remigio Done for post operative pain control  Time out completed  Time-out done with :  RN Pt sedated with Versed 2mg  Monitor on Supine Prepped with Alcohol  Lidocaine 1% used for topical local anesthetic  21 gauge 100 mm Stimuplex needle used  Continuous Inplane dynamic ultrasound technique used, relevant anatomy identified (nerves, vessels, muscles), Local anesthetic spread visualized around nerves  Bupivacaine 0.5% 30cc injected in 5 cc increments after negative aspiration each time. Pt tolerated procedure well. No complications.   Comments:  Decadron 10 mg   [address]

## 2017-12-12 NOTE — ANESTHESIA POST-OP
Postoperative Anesthesia Note    Name:    Otoniel Glynn  MRN:      [id number]    Patient Vitals for the past 12 hrs:   BP Temp Temp src Pulse Resp SpO2 Height Weight   12/12/17 0926 (!) 146/91 - - - - - - -   12/12/17 0915 (!) 145/103 - - 53 15 98 % - -   12/12/17 0855 (!) 145/102 - - - 12 - - -   12/12/17 0850 (!) 141/100 97.4 °F (36.3 °C) - 57 11 96 % - -   12/12/17 0845 (!) 137/94 - - 56 15 96 % - -   12/12/17 0839 (!) 138/93 97.7 °F (36.5 °C) Temporal 56 12 97 % - -   12/12/17 0614 139/84 97 °F (36.1 °C) Temporal 63 16 98 % 6' 2\" (1.88 m) 215 lb (97.5 kg)        LABS:    CBC  Lab Results   Component Value Date/Time    WBC 5.2 06/09/2017 04:14 PM    HGB 15.5 06/09/2017 04:14 PM    HCT 47.0 06/09/2017 04:14 PM     06/09/2017 04:14 PM     RENAL  Lab Results   Component Value Date/Time     06/09/2017 04:14 PM    K 4.4 06/09/2017 04:14 PM     06/09/2017 04:14 PM    CO2 26 06/09/2017 04:14 PM    BUN 17 06/09/2017 04:14 PM    CREATININE 0.9 06/09/2017 04:14 PM    GLUCOSE 91 06/09/2017 04:14 PM     COAGS  No results found for: PROTIME, INR, APTT    Intake & Output: In: 600 [I.V.:600]  Out: -     Nausea & Vomiting:  No    Level of Consciousness:  Awake    Pain Assessment:  Adequate analgesia    Anesthesia Complications:  No apparent anesthetic complications    SUMMARY      Vital signs stable  OK to discharge from Stage I post anesthesia care.   Care transferred from Anesthesiology department on discharge from perioperative area

## 2017-12-20 ENCOUNTER — HOSPITAL ENCOUNTER (OUTPATIENT)
Dept: OCCUPATIONAL THERAPY | Age: 62
Discharge: HOME | End: 2017-11-30
Admitting: ORTHOPAEDIC SURGERY

## 2017-12-20 ENCOUNTER — HOSPITAL ENCOUNTER (OUTPATIENT)
Dept: OCCUPATIONAL THERAPY | Age: 62
Discharge: HOME | End: 2017-12-20
Admitting: ORTHOPAEDIC SURGERY

## 2017-12-20 ENCOUNTER — OFFICE VISIT (OUTPATIENT)
Dept: ORTHOPEDIC SURGERY | Age: 62
End: 2017-12-20

## 2017-12-20 DIAGNOSIS — M19.039 WRIST ARTHRITIS: ICD-10-CM

## 2017-12-20 DIAGNOSIS — M19.132 SCAPHOLUNATE ADVANCED COLLAPSE OF LEFT WRIST: Primary | ICD-10-CM

## 2017-12-20 DIAGNOSIS — M19.049 CMC ARTHRITIS: ICD-10-CM

## 2017-12-20 PROCEDURE — 99024 POSTOP FOLLOW-UP VISIT: CPT | Performed by: ORTHOPAEDIC SURGERY

## 2017-12-20 NOTE — PLAN OF CARE
[facility], [address] [place] [address]  Phone: [phone]  Fax [phone]    Occupational Therapy/Hand Therapy Certification  Dear Referring Practitioner: Dr. Starr Malagon    We had the pleasure of evaluating the following patient for occupational therapy services at [address] [place] [address]. A summary of our findings can be found in the initial assessment below. This includes our plan of care. If you have any questions or concerns regarding these findings, please do not hesitate to contact me at the office phone number checked above. Thank you for the referral.     Physician Signature:_______________________________Date:__________________  By signing above (or electronic signature), therapists plan is approved by physician      Patient: Natividad Wilson   : 1955   MRN: [id number]  Referring Physician: Referring Practitioner: Dr. 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     Diagnosis: M25.332 left wrist pain                                             Insurance information: OT Insurance Information: BCBS [place]    Precautions/ Contra-indications: progress ROM to left wrist wirh caution  Latex Allergy:  [x]No      []Yes  Pacemaker:  [x] No       [] Yes     Preferred Language for Healthcare:   [x]English       []other:    G-Code:  Applied on 2017          [x] Patient reported history, allergies, and medications reviewed - see intake form. SUBJECTIVE:  Date of injury: unknown etiology of pain in left wrist for over 1 year  Date of surgery:   17  Left proximal row carpectomy.   Background/Relevant Medical & Therapy History:      Pain Scale: 0/10   []Constant      []Intermittent    []other:  Pain Location:  Left wrist  Easing factors:    Provocative factors:       Occupational scars):   Covered incision on dorsum of left wrist, visble swelling about left wrist and hand    Sensation:  [x] No reported deficits  [] Intact to light touch    [] Hubbell Aimee test completed, findings as noted:  [] Other:    Palpation: tender around left wrist     Functional Mobility/Transfers/Gait:  [x] Independent - no significant gait deviations  [] Assistance needed   [] Assistive device used: Falls Risk Assessment (30 days):   [x] Falls Risk assessed and no intervention required. [] Falls Risk assessed and Patient requires intervention due to being higher risk   TUG score (>12s at risk):     [] Falls education provided, including      Review Of Systems (ROS): [x]Performed Review of systems (Integumentary, CardioPulmonary, Neurological) by intake and observation. Intake form has been scanned into medical record. Patient has been instructed to contact their primary care physician regarding ROS issues if not already being addressed at this time. ASSESSMENT:   This patient presents with signs and symptoms consistent with the medical diagnosis provided by the referring physician.      Impairments (physical, cognitive and/or psychosocial):  [x] Decreased mobility   [x] Weakness    [] Hypersensitivity   [x] Pain/tenderness   [x] Edema/swelling   [] Decreased coordination (fine/gross motor)   [] Impaired body mechanics  [] Sensory loss  [] Loss of balance   [] Other:      Performance Deficits (to be addressed in plan of care):   [] Bathing    [x] Household Tasks (cooking/cleaning)   [] Dressing    [] Self Feeding   [] Grooming    [] Work/Education   [] Functional Mobility   [] Sleeping/Rest   [] Toileting/Hygiene   [x] Recreational Activities   [] Driving    [] Community/Social Participation   [] Other:     Rehab Potential:   [] Excellent [x] Good [] Fair  [] Poor     Barriers affecting rehab potential:  []Age    []Lack of Motivation   []Co-Morbidities  []Cognitive Function  []Environmental/home/work

## 2017-12-20 NOTE — FLOWSHEET NOTE
[facility], [address] [place] [address] Reji  Phone: [phone]  Fax [phone]  Hand Therapy Daily Treatment Note  Date:  2017    Patient: Trent Olivares   : 1955   MRN: [id number]  Referring Physician: Referring Practitioner: Dr. Ambrose Castellanos Diagnosis Information:   Diagnosis: C52.703 left wrist pain         Date of injury: at lest year history of pain in left wrist, had ganglion cyst removed and carpal tunnel release that did not resolve pain  Date of Surgery/Type of procedure: Insurance information:OT Insurance Information: BCBS [place] \  Comorbidities Affecting Functional Performance:     []Anxiety (F41.9)/Depression (F32.9)   []Diabetes Type 1(E10.65) or 2 (E11.65)   []Rheumatoid Arthritis (M05.9)  []Fibromyalgia (M79.7)  []Neuropathy(G60.9)  []Osteoarthritis(M19.91)  []None   [x]Other:carpal tunnel release and volar ganglion cyst removal on left on 17, hip replaced , knee and shoulder scoped        G-code: OT G-codes  Functional Assessment Tool Used: QuickDASH  Score: 20  Functional Limitation: Carrying, moving and handling objects  Carrying, Moving and Handling Objects Current Status (): At least 20 percent but less than 40 percent impaired, limited or restricted  Carrying, Moving and Handling Objects Goal Status ():  At least 1 percent but less than 20 percent impaired, limited or restricted    Date of Patient follow up with Physician:  17  Preferred Language for Healthcare:   [x]English       []other:  Latex Allergy:  [x]NO      []YES  RESTRICTIONS/PRECAUTIONS:  Proceed with caution with ROm to left wrist  NONE  Progress Note: [x]  Yes  []  No  Next due by : Visit #10       Visit # Insurance Allowable Requires auth   1 ?    no:[]                  yes:[]      Pain level:  0/10     SUBJECTIVE:  No real pain since surgery, but a little sore instructions in use of assistive technology devices/adaptive equipment)     Home Exercise Program:    [x] (18135) Reviewed/Progressed HEP activities related to strengthening, flexibility, endurance, ROM of scapular, scapulothoracic and UE control with self care, reaching, carrying, lifting, house/yardwork, driving/computer work    Manual Treatments:   [] (63754) Provided manual therapy to mobilize soft tissue/joints of the UE for the purpose of modulating pain, promoting relaxation,  increasing ROM, reducing/eliminating soft tissue swelling/inflammation/restriction, improving soft tissue extensibility and allowing for proper ROM for normal function with self care, reaching, carrying, lifting, house/yardwork, driving/computer work    Splinting:  [] Fabrication of: L-code  [] (89718) Checkout for orthotic/prosthetic use, established patient   [] (21069) Orthotic management and training (fitting and assessment)  [] Comments:    Charges:  Timed Code Treatment Minutes: 15   Total Treatment Minutes: 50   [x] EVAL (LOW) 85934   [] OT Re-eval (50231)  [] EVAL (MOD) 99187   [] EVAL (HIGH) 37810       [x] Salome (13609) x  1   [] UCWPO(31926)  [] NMR (23665) x      [] Estim (attended) (09277)   [] Manual ([id number]) x       [] US (49034)  [] TA (62387) x      [] Paraffin (58389)  [] ADL  (44123) x     [x] Splint/L code:    [] Estim (unattended) (33042)  [] Other:  [](22977) Checkout for orthotic use, established patient x       [] (41599) Orthotic mgmt & training  x        GOALS:  Short Term Goals: To be achieved in: 2 weeks  1. Independent in HEP and progression per patient tolerance, in order to prevent re-injury.    2. Patient will have a decrease in pain to facilitate improvement in movement, function, and ADLs as indicated by Functional Deficits.     Long Term Goals to be achieved in 12  weeks, including patient directed goals to address identified performance deficits:  1) Pt to be independent in graded HEP progression with a good level of effort and compliance. 2) Pt to report a score of 14 % or less on the Quick DASH disability questionnaire for increased performance with carrying, moving, and handling objects. 3) Pt will demonstrate increased ROM to left wrist to 40/40 for improved ability to wash self with left hand. Cleven Ewings 4) Pt will demonstrate increased strength to left  50% of right for improved ability to lift up to 8 lbs. In left hand. 5) Pt will have adequate splint ot support wrist and improve use of hadn for functional activities. 6)      Progression Towards Functional goals:  [] Patient is progressing as expected towards functional goals listed. [] Progression is slowed due to complexities listed. [] Progression has been slowed due to co-morbidities.   [x] Plan just implemented, too soon to assess goals progression  [] Other:     ASSESSMENT:    Treatment/Activity Tolerance:  [x] Patient tolerated treatment well [] Patient limited by fatique  [] Patient limited by pain  [] Patient limited by other medical complications  [] Other:     Prognosis: [] Good [] Fair  [] Poor    Patient Requires Follow-up: [x] Yes  [] No    PLAN: Recommend Occupational Therapy 1 times a week for 12 weeks  [] Continue per plan of care [] Alter current plan (see comments)  [x] Plan of care initiated [] Hold pending MD visit [] Discharge    Plan for next session: edema,pain control, progress ROM   Thermal modalities, functional activities and strengthening as inicated, AROM, PROM as indicated    Electronically signed by: Doris Kate, [address] [place] [id number]

## 2017-12-28 ENCOUNTER — HOSPITAL ENCOUNTER (OUTPATIENT)
Dept: OCCUPATIONAL THERAPY | Age: 62
Discharge: HOME | End: 2017-12-28
Admitting: ORTHOPAEDIC SURGERY

## 2017-12-28 NOTE — FLOWSHEET NOTE
powder in the splint and that seems to help. OBJECTIVE:    Date:  Hand Dominance:     [x]  Right    [] Left 2017   Objective Measures:     PAIN 0/10   Quick DASH  20%   Digits tip to DPFC in cm Index: 3.0  Long: 3.0  Rin.5  Small: 2.5   Thumb ROM MP 60  IP 30   Thumb opposition  R:  L: to tip of small   Thumb Radial/Palmar abd ROM R:  L: 40   Wrist ROM Ext/Flex R:  L: 25/45   Rad/Uln dev ROM R:  L: 5/20   Forearm ROM  Sup/pron R:  L: 45/75   Elbow ROM Ext/flex R:  L:   Shoulder Flex  Shoulder Abd  Shoulder IR/ER     Edema in cm circumf. MCPJs R:  L: 23.0   Edema in cm circumf. Wrist R:  L: 21.0    strength in lbs R:  L:   Pinch Strengthin lbs: lat  R:  L:   Pinch Strength in lbs:  3 point R:  L:   MMT:          Modalities: 17     IFC with AROM to fingers and gentle to wrist        Therapeutic Exercise & Activities:     AROM Emphasis on fingers and thumb, gentle to wrist AROM fingers, thumb, wrist and forearm all planes of movement and tendon gliding positions    Edema control Elevation, Tubigrip D and finger motion Elevation, fist pumping, continue tubigrip D   Splint fabrication bivalve wrist cock-up continue                                        Therapeutic Exercise and NMR EXR  [x] (85884) Provided verbal/tactile cueing for activities related to strengthening, flexibility, endurance, ROM  for improvements in scapular, scapulothoracic and UE control with self care, reaching, carrying, lifting, house/yardwork, driving/computer work.    [] (23478) Provided verbal/tactile cueing for activities related to improving balance, coordination, kinesthetic sense, posture, motor skill, proprioception  to assist with  scapular, scapulothoracic and UE control with self care, reaching, carrying, lifting, house/yardwork, driving/computer work. Therapeutic Activities:    [] ( 82539) therapeutic activities, direct (one on one) patient contact.  Use of dynamic activities to improve functional performance. Activities of Daily Living:  [] (07961) Provided self-care/home management training (i.e., activities of daily living and compensatory training, meal preparation, safety procedures, and instructions in use of assistive technology devices/adaptive equipment)     Home Exercise Program:    [x] (69538) Reviewed/Progressed HEP activities related to strengthening, flexibility, endurance, ROM of scapular, scapulothoracic and UE control with self care, reaching, carrying, lifting, house/yardwork, driving/computer work    Manual Treatments:  Edema and scar mobolization  [x] (30710) Provided manual therapy to mobilize soft tissue/joints of the UE for the purpose of modulating pain, promoting relaxation,  increasing ROM, reducing/eliminating soft tissue swelling/inflammation/restriction, improving soft tissue extensibility and allowing for proper ROM for normal function with self care, reaching, carrying, lifting, house/yardwork, driving/computer work    Splinting:  [] Fabrication of: L-code  [] (35157) Checkout for orthotic/prosthetic use, established patient   [] (06174) Orthotic management and training (fitting and assessment)  [] Comments:    Charges:  Timed Code Treatment Minutes: 30   Total Treatment Minutes: 40   [] EVAL (LOW) 30582   [] OT Re-eval (52464)  [] EVAL (MOD) 27904   [] EVAL (HIGH) 04930       [x] Salome (28276) x  1   [] QHYMD(82745)  [] NMR (22434) x      [] Estim (attended) (55720)   [x] Manual ([id number]) x  1    [] US (84720)  [] TA (39095) x      [] Paraffin (15284)  [] ADL  (30760) x     [x] Splint/L code:    [x] Estim (unattended) (65966)  [] Other:  [](06211) Checkout for orthotic use, established patient x       [] (98129) Orthotic mgmt & training  x        GOALS:  Short Term Goals: To be achieved in: 2 weeks  1. Independent in HEP and progression per patient tolerance, in order to prevent re-injury.    2. Patient will have a decrease in pain to facilitate improvement in movement, function, and ADLs as indicated by Functional Deficits.     Long Term Goals to be achieved in 12  weeks, including patient directed goals to address identified performance deficits:  1) Pt to be independent in graded HEP progression with a good level of effort and compliance. 2) Pt to report a score of 14 % or less on the Quick DASH disability questionnaire for increased performance with carrying, moving, and handling objects. 3) Pt will demonstrate increased ROM to left wrist to 40/40 for improved ability to wash self with left hand. Canary Malady 4) Pt will demonstrate increased strength to left  50% of right for improved ability to lift up to 8 lbs. In left hand. 5) Pt will have adequate splint ot support wrist and improve use of hadn for functional activities. 6)      Progression Towards Functional goals:  [x] Patient is progressing as expected towards functional goals listed. [] Progression is slowed due to complexities listed. [] Progression has been slowed due to co-morbidities.   [] Plan just implemented, too soon to assess goals progression  [] Other:     ASSESSMENT:    Treatment/Activity Tolerance:  [x] Patient tolerated treatment well [] Patient limited by fatique  [] Patient limited by pain  [] Patient limited by other medical complications  [] Other:     Prognosis: [] Good [] Fair  [] Poor    Patient Requires Follow-up: [x] Yes  [] No    PLAN: Recommend Occupational Therapy 1 times a week for 12 weeks  [x] Continue per plan of care [] Alter current plan (see comments)  [] Plan of care initiated [] Hold pending MD visit [] Discharge    Plan for next session: edema,pain control, progress ROM   Thermal modalities, functional activities and strengthening as inicated, AROM, PROM as indicated    Electronically signed by: Bekah Hernandez Mt, [address] [place] [phone]

## 2018-01-01 ENCOUNTER — HOSPITAL ENCOUNTER (OUTPATIENT)
Dept: OCCUPATIONAL THERAPY | Age: 63
Discharge: HOME | End: 2018-01-31
Attending: ORTHOPAEDIC SURGERY | Admitting: ORTHOPAEDIC SURGERY

## 2018-01-04 ENCOUNTER — HOSPITAL ENCOUNTER (OUTPATIENT)
Dept: OCCUPATIONAL THERAPY | Age: 63
Discharge: HOME | End: 2018-01-04
Admitting: ORTHOPAEDIC SURGERY

## 2018-01-04 NOTE — FLOWSHEET NOTE
[facility], [address] [place]  Phone: [phone]  Fax [phone]  Hand Therapy Daily Treatment Note  Date:  2018    Patient: Libby Reaves   : 1955   MRN: [id number]  Referring Physician: Referring Practitioner: Dr. Pushpa Richardson Diagnosis Information:   Diagnosis: E21.750 left wrist pain         Date of injury: at lest year history of pain in left wrist, had ganglion cyst removed and carpal tunnel release that did not resolve pain  Date of Surgery/Type of procedure:        17  Left proximal row carpectomy                              Insurance information:OT Insurance Information: [employer] [place] \BCBS - [id number] AUTH  Comorbidities Affecting Functional Performance:     []Anxiety (F41.9)/Depression (F32.9)   []Diabetes Type 1(E10.65) or 2 (E11.65)   []Rheumatoid Arthritis (M05.9)  []Fibromyalgia (M79.7)  []Neuropathy(G60.9)  []Osteoarthritis(M19.91)  []None   [x]Other:carpal tunnel release and volar ganglion cyst removal on left on 17, hip replaced , knee and shoulder scoped        G-code: OT G-codes  Functional Assessment Tool Used: QuickDASH   Score: 20  Functional Limitation: Carrying, moving and handling objects  Carrying, Moving and Handling Objects Current Status (): At least 20 percent but less than 40 percent impaired, limited or restricted  Carrying, Moving and Handling Objects Goal Status ():  At least 1 percent but less than 20 percent impaired, limited or restricted    Date of Patient follow up with Physician:  17  Preferred Language for Healthcare:   [x]English       []other:  Latex Allergy:  [x]NO      []YES  RESTRICTIONS/PRECAUTIONS:  Proceed with caution with ROm to left wrist    Progress Note: [x]  Yes  []  No  Next due by : Visit #10       Visit # Insurance Allowable Requires auth   2 30    no:[x]                  yes:[]

## 2018-01-11 ENCOUNTER — HOSPITAL ENCOUNTER (OUTPATIENT)
Dept: OCCUPATIONAL THERAPY | Age: 63
Discharge: HOME | End: 2018-01-11
Admitting: ORTHOPAEDIC SURGERY

## 2018-01-11 NOTE — FLOWSHEET NOTE
to assist with  scapular, scapulothoracic and UE control with self care, reaching, carrying, lifting, house/yardwork, driving/computer work. Therapeutic Activities:    [] ( 92567) therapeutic activities, direct (one on one) patient contact. Use of dynamic activities to improve functional performance.     Activities of Daily Living:  [] (62642) Provided self-care/home management training (i.e., activities of daily living and compensatory training, meal preparation, safety procedures, and instructions in use of assistive technology devices/adaptive equipment)     Home Exercise Program:    [x] (51148) Reviewed/Progressed HEP activities related to strengthening, flexibility, endurance, ROM of scapular, scapulothoracic and UE control with self care, reaching, carrying, lifting, house/yardwork, driving/computer work    Manual Treatments: scar and retrograde massage  [x] (83822) Provided manual therapy to mobilize soft tissue/joints of the UE for the purpose of modulating pain, promoting relaxation,  increasing ROM, reducing/eliminating soft tissue swelling/inflammation/restriction, improving soft tissue extensibility and allowing for proper ROM for normal function with self care, reaching, carrying, lifting, house/yardwork, driving/computer work    Splinting:  [] Fabrication of: L-code  [] (74463) Checkout for orthotic/prosthetic use, established patient   [] (68712) Orthotic management and training (fitting and assessment)  [] Comments:    Charges:  Timed Code Treatment Minutes: 48   Total Treatment Minutes: 60   [] EVAL (LOW) 53718   [] OT Re-eval (50838)  [] EVAL (MOD) 64734   [] EVAL (HIGH) 23928       [x] Salome (24791) x  2   [] KCYF(21467)  [] NMR (50322) x      [] Estim (attended) (66066)   [x] Manual ([id number]) x  1    [] US (32175)   [] TA (83518) x      [] Paraffin (97391)  [] ADL  ([phone]) x     [] Splint/L code:    [] Estim (unattended) (25360)  [] Other:  [](50597) Checkout for orthotic use, established control, progress ROM   Thermal modalities, functional activities and strengthening as inicated, AROM, PROM as indicated    Electronically signed by: Licha Thomas, [address] [place] [address] - [id number]

## 2018-01-18 ENCOUNTER — HOSPITAL ENCOUNTER (OUTPATIENT)
Dept: OCCUPATIONAL THERAPY | Age: 63
Discharge: HOME | End: 2018-01-18
Admitting: ORTHOPAEDIC SURGERY

## 2018-01-18 NOTE — FLOWSHEET NOTE
in scapular, scapulothoracic and UE control with self care, reaching, carrying, lifting, house/yardwork, driving/computer work.    [] (44357) Provided verbal/tactile cueing for activities related to improving balance, coordination, kinesthetic sense, posture, motor skill, proprioception  to assist with  scapular, scapulothoracic and UE control with self care, reaching, carrying, lifting, house/yardwork, driving/computer work. Therapeutic Activities:    [] ( 32015) therapeutic activities, direct (one on one) patient contact. Use of dynamic activities to improve functional performance.     Activities of Daily Living:  [] (60955) Provided self-care/home management training (i.e., activities of daily living and compensatory training, meal preparation, safety procedures, and instructions in use of assistive technology devices/adaptive equipment)     Home Exercise Program:    [] (70414) Reviewed/Progressed HEP activities related to strengthening, flexibility, endurance, ROM of scapular, scapulothoracic and UE control with self care, reaching, carrying, lifting, house/yardwork, driving/computer work    Manual Treatments: scar and retrograde massage  [x] ([id number]) Provided manual therapy to mobilize soft tissue/joints of the UE for the purpose of modulating pain, promoting relaxation,  increasing ROM, reducing/eliminating soft tissue swelling/inflammation/restriction, improving soft tissue extensibility and allowing for proper ROM for normal function with self care, reaching, carrying, lifting, house/yardwork, driving/computer work    Splinting:  [] Fabrication of: L-code  [] (61412) Checkout for orthotic/prosthetic use, established patient   [] (42736) Orthotic management and training (fitting and assessment)  [] Comments:    Charges:  Timed Code Treatment Minutes: 38   Total Treatment Minutes: 48   [] EVAL (LOW) 97577   [] OT Re-eval (61890)  [] EVAL (MOD) 05863   [] EVAL (HIGH) 25325       [x] Salome (11250) x  2   []

## 2018-01-25 ENCOUNTER — OFFICE VISIT (OUTPATIENT)
Dept: FAMILY MEDICINE CLINIC | Age: 63
End: 2018-01-25

## 2018-01-25 ENCOUNTER — HOSPITAL ENCOUNTER (OUTPATIENT)
Dept: OCCUPATIONAL THERAPY | Age: 63
Discharge: HOME | End: 2018-01-25
Admitting: ORTHOPAEDIC SURGERY

## 2018-01-25 VITALS
TEMPERATURE: 98.4 F | BODY MASS INDEX: 27.6 KG/M2 | RESPIRATION RATE: 16 BRPM | WEIGHT: 215 LBS | SYSTOLIC BLOOD PRESSURE: 122 MMHG | HEART RATE: 63 BPM | DIASTOLIC BLOOD PRESSURE: 82 MMHG

## 2018-01-25 DIAGNOSIS — K59.00 CONSTIPATION, UNSPECIFIED CONSTIPATION TYPE: Primary | ICD-10-CM

## 2018-01-25 DIAGNOSIS — Z12.11 COLON CANCER SCREENING: ICD-10-CM

## 2018-01-25 PROCEDURE — 99213 OFFICE O/P EST LOW 20 MIN: CPT | Performed by: FAMILY MEDICINE

## 2018-01-25 RX ORDER — FAMOTIDINE 20 MG/1
20 TABLET, FILM COATED ORAL 2 TIMES DAILY
Qty: 60 TABLET | Refills: 3 | Status: SHIPPED | OUTPATIENT
Start: 2018-01-25

## 2018-01-25 ASSESSMENT — ENCOUNTER SYMPTOMS
DIARRHEA: 0
ABDOMINAL PAIN: 0
CONSTIPATION: 1

## 2018-01-25 NOTE — OP NOTE
Yes                []  No                  Next due by : Visit #10        Visit # Insurance Allowable Requires auth   5 30    no:[x]                  yes:[]    From 17 to 2018     Pain level:  0/10              SUBJECTIVE: Is 6.5 weeks post op. No pain. Fluctuating edema.       OBJECTIVE:    Date:  Hand Dominance:     [x]  Right    [] Left 2017    Objective Measures:           PAIN 0/10 0/10 0/10 0/10   Quick DASH  20%         Digits tip to DPFC in cm Index: 3.0  Long: 3.0  Rin.5  Small: 2.5   WNL     Thumb ROM MP 60  IP 30   /65  /50     Thumb opposition  R:  L: to tip of small         Thumb Radial/Palmar abd ROM R:  L: 40         Wrist ROM Ext/Flex R:  L: 25/45 R:45/55  L 45/20    L:45/30    45/30   Rad/Uln dev ROM R:  L: 5/20    L;       Forearm ROM  Sup/pron R:  L: 45/75 L: 75/80       Elbow ROM Ext/flex R:  L:         Shoulder Flex  Shoulder Abd  Shoulder IR/ER           Edema in cm circumf. MCPJs R:  L: 23.0         Edema in cm circumf. Wrist R:  L: 21.0          strength in lbs R:  L:         Pinch Strengthin lbs: lat  R:  L:         Pinch Strength in lbs:  3 point R:  L:         MMT:                GOALS:  Short Term Goals: To be achieved in: 2 weeks  1. Independent in HEP and progression per patient tolerance, in order to prevent re-injury.    2. Patient will have a decrease in pain to facilitate improvement in movement, function, and ADLs as indicated by Functional Deficits.     Long Term Goals to be achieved in 12  weeks, including patient directed goals to address identified performance deficits:  1) Pt to be independent in graded HEP progression with a good level of effort and compliance.-ongoing  2) Pt to report a score of 14 % or less on the Quick DASH disability questionnaire for increased performance with carrying, moving, and handling objects.-not assessed  3) Pt will demonstrate increased ROM to left wrist to 40/40 for improved ability to wash self with left hand.-not met  4) Pt will demonstrate increased strength to left  50% of right for improved ability to lift up to 8 lbs. In left hand.-not assessed  5) Pt will have adequate splint support wrist and improve use of hadn for functional activities. -met     Progression Towards Functional goals:  [x] Patient is progressing as expected towards functional goals listed. [] Progression is slowed due to complexities listed. [] Progression has been slowed due to co-morbidities.   [] Plan just implemented, too soon to assess goals progression  [] Other:      ASSESSMENT:  Edema is less, but persistent, slow improvement to wrist ROM, no pain      PLAN: Recommend continuing Occupational Therapy 1 times a week for 6weeks  [x] Continue per plan of care      [] Alter current plan (see comments)     Electronically signed by: Eva Mazariegos , [id number], [address] [facility] - [id number]

## 2018-01-25 NOTE — FLOWSHEET NOTE
Re-eval (67576)  [] EVAL (MOD) 55703   [] EVAL (HIGH) 22948       [x] Salome (90970) x  2   [] YWFSO(46934)  [] NMR (88551) x      [x] Estim (attended) (21880)   [x] Manual (50639) x  1    [] US (08124)   [] TA (52374) x      [] Paraffin (10212)  [] ADL  (60300) x     [] Splint/L code:    [] Estim (unattended) (44384)  [] Other:  [](49209) Checkout for orthotic use, established patient x       [] (43469) Orthotic mgmt & training  x        GOALS:  Short Term Goals: To be achieved in: 2 weeks  1. Independent in HEP and progression per patient tolerance, in order to prevent re-injury. 2. Patient will have a decrease in pain to facilitate improvement in movement, function, and ADLs as indicated by Functional Deficits.     Long Term Goals to be achieved in 12  weeks, including patient directed goals to address identified performance deficits:  1) Pt to be independent in graded HEP progression with a good level of effort and compliance.-ongoing  2) Pt to report a score of 14 % or less on the Quick DASH disability questionnaire for increased performance with carrying, moving, and handling objects.-not assessed  3) Pt will demonstrate increased ROM to left wrist to 40/40 for improved ability to wash self with left hand.-not met  4) Pt will demonstrate increased strength to left  50% of right for improved ability to lift up to 8 lbs. In left hand.-not assessed  5) Pt will have adequate splint support wrist and improve use of hadn for functional activities. -met    Progression Towards Functional goals:  [x] Patient is progressing as expected towards functional goals listed. [] Progression is slowed due to complexities listed. [] Progression has been slowed due to co-morbidities.   [] Plan just implemented, too soon to assess goals progression  [] Other:     ASSESSMENT:  Edema is less, but persistent, slow improvement to wrist ROM, no pain    Treatment/Activity Tolerance:  [x] Patient tolerated treatment well []

## 2018-01-29 ENCOUNTER — OFFICE VISIT (OUTPATIENT)
Dept: ORTHOPEDIC SURGERY | Age: 63
End: 2018-01-29

## 2018-01-29 DIAGNOSIS — M25.532 LEFT WRIST PAIN: Primary | ICD-10-CM

## 2018-01-29 DIAGNOSIS — M19.132 SCAPHOLUNATE ADVANCED COLLAPSE OF LEFT WRIST: ICD-10-CM

## 2018-01-29 DIAGNOSIS — M19.049 CMC ARTHRITIS: ICD-10-CM

## 2018-01-29 DIAGNOSIS — G56.03 CARPAL TUNNEL SYNDROME, BILATERAL: ICD-10-CM

## 2018-01-29 DIAGNOSIS — M19.039 WRIST ARTHRITIS: ICD-10-CM

## 2018-01-29 PROCEDURE — 99024 POSTOP FOLLOW-UP VISIT: CPT | Performed by: ORTHOPAEDIC SURGERY

## 2018-01-29 NOTE — PROGRESS NOTES
Assessment: Good position of left proximal row carpectomy. Today he has a little more swelling than I would normally see at 6 weeks postop    Treatment Plan: He may discontinue his splint now other than during strenuous activities. He says he rolls around a lot at night may continue to use it sleeping. Return in about 6 weeks (around 3/12/2018) for X-ray next visit. History of Present Illness  Fortunato Jewell is a 58 y.o. male. 6-1/2 postop    Review of Systems  Complete Review of Systems performed and is non-contributory except for what is noted in HPI. Vital Signs  There were no vitals filed for this visit. There is no height or weight on file to calculate BMI. Physical Exam  Constitutional:  Patient is well-nourished and demonstrates normal hygiene. Mental Status:  Patient is alert and oriented to person, place and time. Skin:  Intact, no rashes or lesions. Hand Examination: He has a fair amount of swelling today over the dorsum of the wrist.  Full finger range of motion.   Wrist range of motion is listed in his last therapy note but is probably a little more limited than I would normally see at this point after proximal row carpectomy    Additional Comments:     Additional Examinations:  X-Ray Findings:  PA lateral and oblique x-rays of the left wrist show good position of the capitate and lunate fossa of the radius  Additional Diagnostic Test Findings:    Office Procedures:    Orders Placed This Encounter   Procedures    XR WRIST LEFT (MIN 3 VIEWS)     11491     Order Specific Question:   Reason for exam:     Answer:   Wrist Pain

## 2018-01-31 DIAGNOSIS — Z12.11 SCREENING FOR COLON CANCER: Primary | ICD-10-CM

## 2018-01-31 RX ORDER — POLYETHYLENE GLYCOL 3350 17 G/17G
238 POWDER, FOR SOLUTION ORAL ONCE
Qty: 238 G | Refills: 0 | Status: SHIPPED | OUTPATIENT
Start: 2018-01-31 | End: 2018-01-31

## 2018-01-31 NOTE — LETTER
[address] [facility]  [facility] [address]                                       p [phone]  f [phone]    Otoniel Diaz MD                        [facility] [address] 18    10:41 AM    Facility:     [facility] END[facility]                                                          Procedure Date & Time:  [email or web address]             Pt arrival: 9:30AM                                                                                      Patient Name:  Destinee Ch     :  1955 PCP:  Fred Oseguera, DO       Home Ph:    [phone] (home) [phone] (work)          SSN:                                         PROCEDURE:  Colonoscopy, possible polypectomy         88589      DIAGNOSIS:      ICD-10-CM ICD-9-CM    1. Screening for colon cancer Z12.11 V76.51 polyethylene glycol (MIRALAX) powder       Anesthesia: _none__  Time Needed: 30 minutes  Pt Position:  lateral, right side up         Outpatient _X_                                    _X__PREP:   Miralax                           _____Cardiac Clearance by; ___________     Medications to be stopped 5 days before procedure: _________  Additional / Special Orders:                                                                                                                                                                                                                           Destinee Ch    1955                                                    Endoscopy Order   IN ACCORDANCE WITH OUR FORMULARY SYSTEM, A GENERIC EQUIVALENT DRUG MAY BE DISPNSED AND ADMINISTERED UNLESS D. A. W. IS WRITTEN WITH THE MEDICATION ORDER.   DATE HOUR PHYSICIAN:  RECORD DATE, HOUR AND SIGN EACH ENTRY   18 10:30AM 1)  Admit for:   Colonoscopy  EGD     Anesthesia/MAC        ERCP     Upper EUS     Lower EUS                             2)  Diagnosis: Screening for Colon Cancer     3)  Establish IV access Solution:  0.9 Normal Saline   Lactated Ringers    Other:                            Rate:   Danyaosmel Berman      Other:     4)  Check blood sugar on all diabetic patients       Urine Pregnancy test on all menstruating females     5)  Labs:       PT/INR         Platelet       Other:____________     6)  Procedure Medications:     Fentanyl ______micrograms IV   Demerol ________mg IV     Versed _______mg IV                          Other:     7) Dinemap      Pulse Oximetry    Cardiac Monitor     8)  Post Procedure:       Titrate O2 to keep O2 Sat. > 90%     Discharge instructions per                                                                     Endoscopy Protocol     Discharge home when awake and vital signs stable                                                                          Signature:          Date:1/31/18               Time: 10:41 AM   PHYSICIANS ORDERS    COLONOSCOPY PREP INSTRUCTIONS  MlRALAX SPLIT DOSE   [address] [facility], [address] [place] [address] , [place], [facility]     Your colonoscopy is scheduled on: [email or web address]  Dr. Wayne Peters______  Arrival Time: __9:30AM___   DO NOT EAT OR DRINK (INCLUDING WATER) AFTER: __7:30AM__  's Name__Kenyatta/Noreen__ [phone]    Keep these papers together; REVIEW ALL OF THEM AT LEAST 7 DAYS BEFORE THE PROCEDURE. Please complete all paperwork; including a current list of your medications, to avoid delays in the admission process. The following instructions must be followed in order to ensure your procedure has optimal outcomes. - KEEP YOUR APPOINTMENT. If for any reason, you are unable to keep your appointment, please notify us at [phone] within 72 hours before your procedure. - You MUST have a responsible adult to drive you, who MUST remain at our facility the ENTIRE time. If not the procedure will be cancelled. You may go by taxi ONLY if you have a responsible adult with you.  You may experience light headedness, dizziness etc., therefore you should have a responsible adult remain with you until the morning after your procedure. - Bring your insurance card and 's license. Call your insurance carrier to verify your benefits, and confirm that our facility is in your network, prior to the procedure date to ensure coverage. The facility name is listed as [facility] and the tax ID# is [id number].  - Due to the safety and privacy of our patients, only one visitor is allowed in the recovery area after the procedure. The center will not be responsible for lost valuables so please leave them at home. - Try to avoid seeds (strawberries, moncho, and rye) for one week prior to your procedure. - If you have questions after beginning the prep, call [phone] between 8:30 am & 4:00pm you will speak to a nurse or medical assistant, after 4:00pm you will reach the physician on call. - Hydration (body fluid) is the most important aspect of an effective and safe prep. If you are not drinking enough fluid you may experience cramping, nausea and dizziness. - Common side effects of the prep are nausea, bloating and shaking chills. If any of these occur, take a break from the prep (30 minutes to 1 hour) and then restart. If unable to complete after that notify the Physician as your procedure may need to be cancelled. Nausea medication or an alternative prep is sometimes called in.  - Do not leave home after starting the prep. Frequent, liquid stools may begin as soon as 15 minutes after the first bottle, although it could take up to 4 hours or longer for your first bowel movement. - Even if your stools are clear and watery in appearance, TAKE ALL OF THE PREP.  - Using baby wipes and nonprescription ointments, such as Desitin, will help with the irritation caused by frequent loose stools.

## 2018-02-01 ENCOUNTER — HOSPITAL ENCOUNTER (OUTPATIENT)
Dept: OCCUPATIONAL THERAPY | Age: 63
Discharge: HOME | End: 2018-02-28
Attending: ORTHOPAEDIC SURGERY | Admitting: ORTHOPAEDIC SURGERY

## 2018-02-23 ENCOUNTER — TELEPHONE (OUTPATIENT)
Dept: GASTROENTEROLOGY | Age: 63
End: 2018-02-23

## 2018-02-26 ENCOUNTER — HOSPITAL ENCOUNTER (OUTPATIENT)
Dept: OTHER | Age: 63
Discharge: HOME | End: 2018-02-26
Attending: INTERNAL MEDICINE | Admitting: INTERNAL MEDICINE

## 2018-02-26 VITALS
OXYGEN SATURATION: 98 % | HEIGHT: 74 IN | BODY MASS INDEX: 27.59 KG/M2 | SYSTOLIC BLOOD PRESSURE: 125 MMHG | WEIGHT: 215 LBS | HEART RATE: 51 BPM | DIASTOLIC BLOOD PRESSURE: 81 MMHG | TEMPERATURE: 97.3 F | RESPIRATION RATE: 16 BRPM

## 2018-02-26 DIAGNOSIS — Z12.11 SCREENING FOR COLON CANCER: ICD-10-CM

## 2018-02-26 PROCEDURE — G0121 COLON CA SCRN NOT HI RSK IND: HCPCS | Performed by: INTERNAL MEDICINE

## 2018-02-26 RX ORDER — SODIUM CHLORIDE 9 MG/ML
INJECTION, SOLUTION INTRAVENOUS ONCE
Status: COMPLETED | OUTPATIENT
Start: 2018-02-26 | End: 2018-02-26

## 2018-02-26 RX ADMIN — SODIUM CHLORIDE: 9 INJECTION, SOLUTION INTRAVENOUS at 10:49

## 2018-02-26 ASSESSMENT — PAIN SCALES - GENERAL: PAINLEVEL_OUTOF10: 0

## 2018-02-26 ASSESSMENT — PAIN - FUNCTIONAL ASSESSMENT: PAIN_FUNCTIONAL_ASSESSMENT: 0-10

## 2018-02-26 NOTE — H&P
Bilateral 1960     CURRENT MEDICATIONS   (This list may include medications prescribed during this encounter as epic can not insert only the list prior to this encounter.)  Current Outpatient Rx   Medication Sig Dispense Refill    famotidine (PEPCID) 20 MG tablet Take 1 tablet by mouth 2 times daily 60 tablet 3    LUTEIN PO Take by mouth      Multiple Vitamins-Minerals (VITEYES AREDS FORMULA PO) Take by mouth      Omega-3 Fatty Acids (OMEGA 3 PO) Take by mouth daily       calcium carbonate (OSCAL) 500 MG TABS tablet Take 500 mg by mouth daily.  Glucosamine-Chondroitin (GLUCOSAMINE CHONDR COMPLEX PO) Take by mouth daily         ALLERGIES     Allergies   Allergen Reactions    Naprosyn [Naproxen] Rash     IMMUNIZATIONS     Immunization History   Administered Date(s) Administered    Zoster Live (Zostavax) 09/01/2017     REVIEW OF SYSTEMS     Constitutional: Negative for appetite change, chills, fatigue, fever and unexpected weight change. HENT: Negative for ear pain, hearing loss and nosebleeds. Eyes: Negative for pain and visual disturbance. Respiratory: Negative for cough, shortness of breath and wheezing. Cardiovascular: Negative for chest pain, palpitations and leg swelling. Gastrointestinal: see HPI for details. Endocrine: Negative for polydipsia, polyphagia and polyuria. Genitourinary: Negative for difficulty urinating, dysuria, hematuria and urgency. Musculoskeletal: Positive for arthralgias and back pain. Skin: Negative for pallor and rash. Allergic/Immunologic: Negative for environmental allergies and immunocompromised state. Neurological: Negative for seizures, syncope and numbness. Hematological: Negative for adenopathy. Does not bruise/bleed easily. Psychiatric/Behavioral: Negative for agitation, confusion, hallucinations and suicidal ideas.    PHYSICAL EXAM   BP (!) 153/73   Pulse 58   Temp 97.6 °F (36.4 °C) (Temporal)   Resp 16   Ht 6' 2\" (1.88 m)   Wt 215 lb

## 2018-03-12 ENCOUNTER — OFFICE VISIT (OUTPATIENT)
Dept: ORTHOPEDIC SURGERY | Age: 63
End: 2018-03-12

## 2018-03-12 DIAGNOSIS — M65.311 TRIGGER FINGER OF RIGHT THUMB: ICD-10-CM

## 2018-03-12 DIAGNOSIS — M25.532 WRIST PAIN, LEFT: Primary | ICD-10-CM

## 2018-03-12 PROCEDURE — 20550 NJX 1 TENDON SHEATH/LIGAMENT: CPT | Performed by: ORTHOPAEDIC SURGERY

## 2018-03-12 PROCEDURE — 99212 OFFICE O/P EST SF 10 MIN: CPT | Performed by: ORTHOPAEDIC SURGERY

## 2018-03-12 NOTE — PROGRESS NOTES
Assessment: Excellent result from left proximal row carpectomy. He has a new problem which is a right trigger thumb. Treatment Plan: We discussed long-term protective measures for his proximal row carpectomy as I think he's doing extremely well. We have also injected his left trigger thumb today and we talked about trigger thumb releases if necessary    Return if symptoms worsen or fail to improve. History of Present Illness  Drew Silva is a 58 y.o. male. There is a very nice gentleman that has had a left proximal row carpectomies also had surgery on his contralateral right wrist and is doing extremely well. He is having a new problem which is locking of his left thumb    Review of Systems  Pertinent items are noted in HPI  Complete Review of Systems reviewed from patient history form dated 2017 and available in the patients chart under the media tab. Vital Signs  There were no vitals filed for this visit. There is no height or weight on file to calculate BMI. Physical Exam  Constitutional:  Patient is well-nourished and demonstrates normal hygiene. Mental Status:  Patient is alert and oriented to person, place and time. Skin:  Intact, no rashes or lesions. Hand Examination: Has definite tenderness over the A1 pulley of the right thumb with a definite palpable triggering. Examining his left wrist shows his dorsiflexion is limited only to about 30° but volar flexion to about 45. Minimal discomfort    Additional Comments:     Additional Examinations:  X-Ray Findings: PA lateral and oblique x-rays of the left wrist show excellent position of the capitate and the lunate fossa of the radius.   I think one of the reasons that he is limited in his dorsiflexion as he has a fairly deep cup in the lunate fossa of the radius so I think the dorsum of the capitate is impacting on the dorsal lip of the radius with full extension   Additional Diagnostic Test Findings:    Office Procedures:

## 2018-04-11 PROBLEM — Z12.11 SCREENING FOR COLON CANCER: Status: RESOLVED | Noted: 2018-02-26 | Resolved: 2018-04-11

## 2018-05-24 ENCOUNTER — OFFICE VISIT (OUTPATIENT)
Dept: FAMILY MEDICINE CLINIC | Age: 63
End: 2018-05-24

## 2018-05-24 VITALS
RESPIRATION RATE: 16 BRPM | SYSTOLIC BLOOD PRESSURE: 125 MMHG | BODY MASS INDEX: 27.67 KG/M2 | HEART RATE: 61 BPM | OXYGEN SATURATION: 95 % | WEIGHT: 215.6 LBS | HEIGHT: 74 IN | TEMPERATURE: 98.1 F | DIASTOLIC BLOOD PRESSURE: 82 MMHG

## 2018-05-24 DIAGNOSIS — M54.50 ACUTE MIDLINE LOW BACK PAIN WITHOUT SCIATICA: Primary | ICD-10-CM

## 2018-05-24 DIAGNOSIS — K40.90 NON-RECURRENT UNILATERAL INGUINAL HERNIA WITHOUT OBSTRUCTION OR GANGRENE: ICD-10-CM

## 2018-05-24 PROCEDURE — 99214 OFFICE O/P EST MOD 30 MIN: CPT | Performed by: NURSE PRACTITIONER

## 2018-05-24 RX ORDER — CYCLOBENZAPRINE HCL 10 MG
5-10 TABLET ORAL 3 TIMES DAILY PRN
Qty: 30 TABLET | Refills: 0 | Status: SHIPPED | OUTPATIENT
Start: 2018-05-24 | End: 2018-07-23 | Stop reason: ALTCHOICE

## 2018-05-24 RX ORDER — MELOXICAM 15 MG/1
15 TABLET ORAL DAILY
Qty: 30 TABLET | Refills: 0 | Status: SHIPPED | OUTPATIENT
Start: 2018-05-24 | End: 2018-07-23 | Stop reason: ALTCHOICE

## 2018-05-24 ASSESSMENT — PATIENT HEALTH QUESTIONNAIRE - PHQ9
SUM OF ALL RESPONSES TO PHQ QUESTIONS 1-9: 0
SUM OF ALL RESPONSES TO PHQ9 QUESTIONS 1 & 2: 0
2. FEELING DOWN, DEPRESSED OR HOPELESS: 0
1. LITTLE INTEREST OR PLEASURE IN DOING THINGS: 0

## 2018-05-24 ASSESSMENT — ENCOUNTER SYMPTOMS
ALLERGIC/IMMUNOLOGIC NEGATIVE: 1
EYES NEGATIVE: 1
GASTROINTESTINAL NEGATIVE: 1
RESPIRATORY NEGATIVE: 1
BACK PAIN: 1

## 2018-07-20 DIAGNOSIS — Z00.00 ANNUAL PHYSICAL EXAM: Primary | ICD-10-CM

## 2018-07-20 DIAGNOSIS — Z00.00 ANNUAL PHYSICAL EXAM: ICD-10-CM

## 2018-07-20 DIAGNOSIS — Z12.5 SCREENING FOR PROSTATE CANCER: ICD-10-CM

## 2018-07-20 LAB
A/G RATIO: 1.9 (ref 1.1–2.2)
ALBUMIN SERPL-MCNC: 4.3 G/DL (ref 3.4–5)
ALP BLD-CCNC: 81 U/L (ref 40–129)
ALT SERPL-CCNC: 9 U/L (ref 10–40)
ANION GAP SERPL CALCULATED.3IONS-SCNC: 15 MMOL/L (ref 3–16)
AST SERPL-CCNC: 17 U/L (ref 15–37)
BASOPHILS ABSOLUTE: 0.1 K/UL (ref 0–0.2)
BASOPHILS RELATIVE PERCENT: 1.1 %
BILIRUB SERPL-MCNC: 0.5 MG/DL (ref 0–1)
BUN BLDV-MCNC: 17 MG/DL (ref 7–20)
CALCIUM SERPL-MCNC: 9.5 MG/DL (ref 8.3–10.6)
CHLORIDE BLD-SCNC: 102 MMOL/L (ref 99–110)
CHOLESTEROL, TOTAL: 179 MG/DL (ref 0–199)
CO2: 26 MMOL/L (ref 21–32)
CREAT SERPL-MCNC: 0.9 MG/DL (ref 0.8–1.3)
EOSINOPHILS ABSOLUTE: 0.3 K/UL (ref 0–0.6)
EOSINOPHILS RELATIVE PERCENT: 5.8 %
GFR AFRICAN AMERICAN: >60
GFR NON-AFRICAN AMERICAN: >60
GLOBULIN: 2.3 G/DL
GLUCOSE BLD-MCNC: 95 MG/DL (ref 70–99)
HCT VFR BLD CALC: 45.3 % (ref 40.5–52.5)
HDLC SERPL-MCNC: 66 MG/DL (ref 40–60)
HEMOGLOBIN: 15.3 G/DL (ref 13.5–17.5)
LDL CHOLESTEROL CALCULATED: 95 MG/DL
LYMPHOCYTES ABSOLUTE: 1.7 K/UL (ref 1–5.1)
LYMPHOCYTES RELATIVE PERCENT: 34 %
MCH RBC QN AUTO: 30.5 PG (ref 26–34)
MCHC RBC AUTO-ENTMCNC: 33.8 G/DL (ref 31–36)
MCV RBC AUTO: 90.5 FL (ref 80–100)
MONOCYTES ABSOLUTE: 0.5 K/UL (ref 0–1.3)
MONOCYTES RELATIVE PERCENT: 9.2 %
NEUTROPHILS ABSOLUTE: 2.5 K/UL (ref 1.7–7.7)
NEUTROPHILS RELATIVE PERCENT: 49.9 %
PDW BLD-RTO: 14.7 % (ref 12.4–15.4)
PLATELET # BLD: 360 K/UL (ref 135–450)
PMV BLD AUTO: 7.7 FL (ref 5–10.5)
POTASSIUM SERPL-SCNC: 4.7 MMOL/L (ref 3.5–5.1)
PROSTATE SPECIFIC ANTIGEN: 0.73 NG/ML (ref 0–4)
RBC # BLD: 5.01 M/UL (ref 4.2–5.9)
SODIUM BLD-SCNC: 143 MMOL/L (ref 136–145)
TOTAL PROTEIN: 6.6 G/DL (ref 6.4–8.2)
TRIGL SERPL-MCNC: 88 MG/DL (ref 0–150)
VITAMIN D 25-HYDROXY: 63.4 NG/ML
VLDLC SERPL CALC-MCNC: 18 MG/DL
WBC # BLD: 5 K/UL (ref 4–11)

## 2018-07-21 LAB
ESTIMATED AVERAGE GLUCOSE: 91.1 MG/DL
HBA1C MFR BLD: 4.8 %

## 2018-07-23 ENCOUNTER — OFFICE VISIT (OUTPATIENT)
Dept: FAMILY MEDICINE CLINIC | Age: 63
End: 2018-07-23

## 2018-07-23 VITALS
HEIGHT: 74 IN | HEART RATE: 68 BPM | OXYGEN SATURATION: 98 % | BODY MASS INDEX: 27.59 KG/M2 | DIASTOLIC BLOOD PRESSURE: 82 MMHG | WEIGHT: 215 LBS | SYSTOLIC BLOOD PRESSURE: 130 MMHG

## 2018-07-23 DIAGNOSIS — Z00.00 ANNUAL PHYSICAL EXAM: Primary | ICD-10-CM

## 2018-07-23 PROCEDURE — 99396 PREV VISIT EST AGE 40-64: CPT | Performed by: FAMILY MEDICINE

## 2018-07-23 ASSESSMENT — ENCOUNTER SYMPTOMS
CONSTIPATION: 0
PHOTOPHOBIA: 0
BACK PAIN: 1
COUGH: 0
SINUS PRESSURE: 0
BLOOD IN STOOL: 0
WHEEZING: 0
ABDOMINAL PAIN: 0
SHORTNESS OF BREATH: 0
SORE THROAT: 0
DIARRHEA: 0
TROUBLE SWALLOWING: 0

## 2018-07-23 NOTE — PATIENT INSTRUCTIONS
Continue current supplements. Follow 1 year    Call the surgeons about the hernia.     Tried to lift therapy and the right heel and see if the back symptoms improve some

## 2018-07-23 NOTE — PROGRESS NOTES
Subjective:      Patient ID: Kunal Squires is a 58 y.o. male. Here for his annual physical.  Mother and father both had recent medical problems. Occupied a lot of his time. They are  Improving some so some less workload and stress. There is no interval hx of hospitalization or significant illness    The patients medications, medical hx and family hx were reviewed  Immunizations, surveillance and preventive care discussed. Has been generally OK  Some muscular lower back pain        Review of Systems   Constitutional: Negative for activity change, appetite change, fatigue and unexpected weight change. HENT: Negative for congestion, sinus pressure, sore throat and trouble swallowing. Eyes: Negative for photophobia and visual disturbance. Respiratory: Negative for cough, shortness of breath and wheezing. Cardiovascular: Negative for chest pain, palpitations and leg swelling. Gastrointestinal: Negative for abdominal pain, blood in stool, constipation and diarrhea. Has a right inguinal hernia. .  No real sx /  Bowel issues. Endocrine: Negative for cold intolerance and heat intolerance. Genitourinary: Negative for dysuria, flank pain, frequency, hematuria and urgency. Musculoskeletal: Positive for back pain. Negative for arthralgias and myalgias. Lower back pain in some positions. At times referred sx to the buttocks / lower extremities. Sore / stiff when first gets up. Skin: Negative for rash. Allergic/Immunologic: Negative for environmental allergies and food allergies. Neurological: Negative for tremors, seizures, facial asymmetry, speech difficulty, numbness and headaches. Psychiatric/Behavioral: Negative for dysphoric mood and sleep disturbance. The patient is not nervous/anxious. Objective:   Physical Exam   Constitutional: He is oriented to person, place, and time. He appears well-developed and well-nourished. No distress. HENT:   Head: Normocephalic. Progress to 1/2 inch over several months  Addressing leg length difference may improve his lower back right hip and leg pain. Follow after several months. Try to resume a modest exercise program.    No additional medications or treatments are needed. Prior to Visit Medications    Medication Sig Taking? Authorizing Provider   famotidine (PEPCID) 20 MG tablet Take 1 tablet by mouth 2 times daily Yes Ac Marrero, DO   LUTEIN PO Take by mouth Yes Historical Provider, MD   Multiple Vitamins-Minerals (VITEYES AREDS FORMULA PO) Take by mouth Yes Historical Provider, MD   Omega-3 Fatty Acids (OMEGA 3 PO) Take by mouth daily  Yes Historical Provider, MD   calcium carbonate (OSCAL) 500 MG TABS tablet Take 500 mg by mouth daily.  Yes Historical Provider, MD   Glucosamine-Chondroitin (GLUCOSAMINE CHONDR COMPLEX PO) Take by mouth daily  Yes Historical Provider, MD           Follow clinically

## 2018-07-26 ENCOUNTER — INITIAL CONSULT (OUTPATIENT)
Dept: SURGERY | Age: 63
End: 2018-07-26

## 2018-07-26 ENCOUNTER — PREP FOR PROCEDURE (OUTPATIENT)
Dept: SURGERY | Age: 63
End: 2018-07-26

## 2018-07-26 VITALS
DIASTOLIC BLOOD PRESSURE: 74 MMHG | WEIGHT: 217 LBS | SYSTOLIC BLOOD PRESSURE: 122 MMHG | HEIGHT: 74 IN | BODY MASS INDEX: 27.85 KG/M2

## 2018-07-26 DIAGNOSIS — K40.20 NON-RECURRENT BILATERAL INGUINAL HERNIA WITHOUT OBSTRUCTION OR GANGRENE: Primary | ICD-10-CM

## 2018-07-26 PROCEDURE — 99242 OFF/OP CONSLTJ NEW/EST SF 20: CPT | Performed by: SURGERY

## 2018-07-26 RX ORDER — HEPARIN SODIUM 5000 [USP'U]/ML
5000 INJECTION, SOLUTION INTRAVENOUS; SUBCUTANEOUS ONCE
Status: CANCELLED | OUTPATIENT
Start: 2018-07-26

## 2018-07-26 RX ORDER — SODIUM CHLORIDE 0.9 % (FLUSH) 0.9 %
10 SYRINGE (ML) INJECTION EVERY 12 HOURS SCHEDULED
Status: CANCELLED | OUTPATIENT
Start: 2018-07-26 | End: 2019-07-26

## 2018-07-26 RX ORDER — SODIUM CHLORIDE 0.9 % (FLUSH) 0.9 %
10 SYRINGE (ML) INJECTION PRN
Status: CANCELLED | OUTPATIENT
Start: 2018-07-26 | End: 2019-07-26

## 2018-07-26 NOTE — PROGRESS NOTES
diverticulosis    HAND SURGERY Left 12/12/2017    Proximal row carpectomy    JOINT REPLACEMENT  2008    rt total hip    KNEE ARTHROSCOPY Left 8/20/2014    LEFT KNEE ARTHROSCOPY, CHONDROPLASTY, SYNOVECTOMY, PARTIAL    SHOULDER ARTHROSCOPY Left 10/28/14    debridement,rotator cuff repair    TONSILLECTOMY AND ADENOIDECTOMY Bilateral 1960     Family History   Problem Relation Age of Onset    Cancer Mother         skin cancer    High Blood Pressure Mother     Cancer Father         bladder    High Blood Pressure Father     No Known Problems Maternal Grandmother     No Known Problems Maternal Grandfather     Cancer Paternal Grandmother         leukemia    No Known Problems Paternal Grandfather     Diabetes Neg Hx     Heart Disease Neg Hx      Social History     Social History    Marital status: Single     Spouse name: N/A    Number of children: N/A    Years of education: N/A     Occupational History    Not on file. Social History Main Topics    Smoking status: Never Smoker    Smokeless tobacco: Never Used    Alcohol use 0.0 oz/week      Comment: 1-3 beers a week.  Drug use: No    Sexual activity: Not on file     Other Topics Concern    Not on file     Social History Narrative    No narrative on file          Vitals:    07/26/18 1000   BP: 122/74   Weight: 217 lb (98.4 kg)   Height: 6' 2.02\" (1.88 m)     Body mass index is 27.85 kg/m².      Wt Readings from Last 3 Encounters:   07/26/18 217 lb (98.4 kg)   07/23/18 215 lb (97.5 kg)   05/24/18 215 lb 9.6 oz (97.8 kg)     BP Readings from Last 3 Encounters:   07/26/18 122/74   07/23/18 130/82   05/24/18 125/82          REVIEW OF SYSTEMS:   · 10 point review of systems performed; please refer to HPI with pertinent positives, all other ROS are negative    PHYSICAL EXAM:    CONSTITUTIONAL:  awake, alert, no apparent distress and normal weight  ENT:  normocepalic, without obvious abnormality  NECK:  supple, symmetrical, trachea midline   LUNGS:  Resp

## 2018-08-20 ENCOUNTER — ANESTHESIA EVENT (OUTPATIENT)
Dept: OPERATING ROOM | Age: 63
End: 2018-08-20
Payer: COMMERCIAL

## 2018-08-22 NOTE — ANESTHESIA PRE PROCEDURE
Department of Anesthesiology  Preprocedure Note       Name:  Abdullahi Rogers   Age:  58 y.o.  :  1955                                          MRN:  [id number]         Date:  2018      Surgeon:  Magdiel Sheppard MD    Procedure:   ROBOTIC INGUINAL HERNIA REPAIR WITH MESH    HPI:  This is 58y.o. year old male seen at request of Mike Box DO. Patient presents for evaluation of right groin pain, groin lump. Symptoms were first noted several months ago. Pain is sharp, intermittent, radiating to testicle. Lump is reducible. Pt has no symptoms of  chronic constipation, chronic cough, difficulty urinating. Pt. does not have previous history of prior  Inguinal hernia surgery. His pain is exacerbated by lifting and straining. Medications prior to admission:    famotidine (PEPCID) 20 MG tablet Take 1 tablet by mouth 2 times daily   LUTEIN PO Take by mouth   Multiple Vitamins-Minerals  Take by mouth   Omega-3 Fatty Acids (OMEGA 3 PO) Take by mouth daily    calcium carbonate (OSCAL) 500 MG TABS tablet Take 500 mg by mouth daily.    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

## 2018-08-23 ENCOUNTER — ANESTHESIA (OUTPATIENT)
Dept: OPERATING ROOM | Age: 63
End: 2018-08-23
Payer: COMMERCIAL

## 2018-08-23 ENCOUNTER — HOSPITAL ENCOUNTER (OUTPATIENT)
Age: 63
Setting detail: OUTPATIENT SURGERY
Discharge: HOME | End: 2018-08-23
Attending: SURGERY | Admitting: SURGERY
Payer: COMMERCIAL

## 2018-08-23 VITALS
DIASTOLIC BLOOD PRESSURE: 67 MMHG | BODY MASS INDEX: 27.85 KG/M2 | RESPIRATION RATE: 20 BRPM | SYSTOLIC BLOOD PRESSURE: 139 MMHG | TEMPERATURE: 97.2 F | OXYGEN SATURATION: 96 % | WEIGHT: 217 LBS | HEIGHT: 74 IN | HEART RATE: 57 BPM

## 2018-08-23 VITALS
SYSTOLIC BLOOD PRESSURE: 132 MMHG | DIASTOLIC BLOOD PRESSURE: 91 MMHG | RESPIRATION RATE: 13 BRPM | OXYGEN SATURATION: 99 %

## 2018-08-23 DIAGNOSIS — K40.20 BILATERAL INGUINAL HERNIA WITHOUT OBSTRUCTION OR GANGRENE, RECURRENCE NOT SPECIFIED: Primary | ICD-10-CM

## 2018-08-23 PROCEDURE — 7100000010 HC PHASE II RECOVERY - FIRST 15 MIN: Performed by: SURGERY

## 2018-08-23 PROCEDURE — 2709999900 HC NON-CHARGEABLE SUPPLY: Performed by: SURGERY

## 2018-08-23 PROCEDURE — 2500000003 HC RX 250 WO HCPCS: Performed by: SURGERY

## 2018-08-23 PROCEDURE — 6360000002 HC RX W HCPCS: Performed by: SURGERY

## 2018-08-23 PROCEDURE — 3600000019 HC SURGERY ROBOT ADDTL 15MIN: Performed by: SURGERY

## 2018-08-23 PROCEDURE — 3600000009 HC SURGERY ROBOT BASE: Performed by: SURGERY

## 2018-08-23 PROCEDURE — 93010 ELECTROCARDIOGRAM REPORT: CPT | Performed by: INTERNAL MEDICINE

## 2018-08-23 PROCEDURE — 2580000003 HC RX 258: Performed by: SURGERY

## 2018-08-23 PROCEDURE — 49650 LAP ING HERNIA REPAIR INIT: CPT | Performed by: SURGERY

## 2018-08-23 PROCEDURE — 2580000003 HC RX 258: Performed by: ANESTHESIOLOGY

## 2018-08-23 PROCEDURE — 93005 ELECTROCARDIOGRAM TRACING: CPT | Performed by: ANESTHESIOLOGY

## 2018-08-23 PROCEDURE — 7100000001 HC PACU RECOVERY - ADDTL 15 MIN: Performed by: SURGERY

## 2018-08-23 PROCEDURE — S2900 ROBOTIC SURGICAL SYSTEM: HCPCS | Performed by: SURGERY

## 2018-08-23 PROCEDURE — 7100000000 HC PACU RECOVERY - FIRST 15 MIN: Performed by: SURGERY

## 2018-08-23 PROCEDURE — 6360000002 HC RX W HCPCS: Performed by: NURSE ANESTHETIST, CERTIFIED REGISTERED

## 2018-08-23 PROCEDURE — 2500000003 HC RX 250 WO HCPCS: Performed by: NURSE ANESTHETIST, CERTIFIED REGISTERED

## 2018-08-23 PROCEDURE — 7100000011 HC PHASE II RECOVERY - ADDTL 15 MIN: Performed by: SURGERY

## 2018-08-23 PROCEDURE — 3700000000 HC ANESTHESIA ATTENDED CARE: Performed by: SURGERY

## 2018-08-23 PROCEDURE — 3700000001 HC ADD 15 MINUTES (ANESTHESIA): Performed by: SURGERY

## 2018-08-23 PROCEDURE — C1781 MESH (IMPLANTABLE): HCPCS | Performed by: SURGERY

## 2018-08-23 DEVICE — MESH HERN L W10.8XL16CM L INGUINAL WHT POLYPR MFIL: Type: IMPLANTABLE DEVICE | Site: ABDOMEN | Status: FUNCTIONAL

## 2018-08-23 DEVICE — MESH HERN L W10.8XL16CM R INGUINAL WHT POLYPR MFIL: Type: IMPLANTABLE DEVICE | Site: ABDOMEN | Status: FUNCTIONAL

## 2018-08-23 RX ORDER — SODIUM CHLORIDE 0.9 % (FLUSH) 0.9 %
10 SYRINGE (ML) INJECTION EVERY 12 HOURS SCHEDULED
Status: DISCONTINUED | OUTPATIENT
Start: 2018-08-23 | End: 2018-08-23 | Stop reason: HOSPADM

## 2018-08-23 RX ORDER — SODIUM CHLORIDE 0.9 % (FLUSH) 0.9 %
10 SYRINGE (ML) INJECTION PRN
Status: DISCONTINUED | OUTPATIENT
Start: 2018-08-23 | End: 2018-08-23 | Stop reason: HOSPADM

## 2018-08-23 RX ORDER — HYDROMORPHONE HCL 110MG/55ML
0.25 PATIENT CONTROLLED ANALGESIA SYRINGE INTRAVENOUS EVERY 5 MIN PRN
Status: DISCONTINUED | OUTPATIENT
Start: 2018-08-23 | End: 2018-08-23 | Stop reason: HOSPADM

## 2018-08-23 RX ORDER — FENTANYL CITRATE 50 UG/ML
25 INJECTION, SOLUTION INTRAMUSCULAR; INTRAVENOUS EVERY 5 MIN PRN
Status: DISCONTINUED | OUTPATIENT
Start: 2018-08-23 | End: 2018-08-23 | Stop reason: HOSPADM

## 2018-08-23 RX ORDER — HEPARIN SODIUM 5000 [USP'U]/ML
5000 INJECTION, SOLUTION INTRAVENOUS; SUBCUTANEOUS ONCE
Status: COMPLETED | OUTPATIENT
Start: 2018-08-23 | End: 2018-08-23

## 2018-08-23 RX ORDER — PROPOFOL 10 MG/ML
INJECTION, EMULSION INTRAVENOUS PRN
Status: DISCONTINUED | OUTPATIENT
Start: 2018-08-23 | End: 2018-08-23 | Stop reason: SDUPTHER

## 2018-08-23 RX ORDER — LIDOCAINE HYDROCHLORIDE 20 MG/ML
INJECTION, SOLUTION EPIDURAL; INFILTRATION; INTRACAUDAL; PERINEURAL PRN
Status: DISCONTINUED | OUTPATIENT
Start: 2018-08-23 | End: 2018-08-23 | Stop reason: SDUPTHER

## 2018-08-23 RX ORDER — SODIUM CHLORIDE, SODIUM LACTATE, POTASSIUM CHLORIDE, CALCIUM CHLORIDE 600; 310; 30; 20 MG/100ML; MG/100ML; MG/100ML; MG/100ML
INJECTION, SOLUTION INTRAVENOUS CONTINUOUS
Status: DISCONTINUED | OUTPATIENT
Start: 2018-08-23 | End: 2018-08-23 | Stop reason: HOSPADM

## 2018-08-23 RX ORDER — OXYCODONE HYDROCHLORIDE AND ACETAMINOPHEN 5; 325 MG/1; MG/1
1 TABLET ORAL EVERY 6 HOURS PRN
Qty: 28 TABLET | Refills: 0 | Status: SHIPPED | OUTPATIENT
Start: 2018-08-23 | End: 2018-08-30

## 2018-08-23 RX ORDER — EPHEDRINE SULFATE 50 MG/ML
INJECTION INTRAVENOUS PRN
Status: DISCONTINUED | OUTPATIENT
Start: 2018-08-23 | End: 2018-08-23 | Stop reason: SDUPTHER

## 2018-08-23 RX ORDER — ONDANSETRON 2 MG/ML
4 INJECTION INTRAMUSCULAR; INTRAVENOUS
Status: DISCONTINUED | OUTPATIENT
Start: 2018-08-23 | End: 2018-08-23 | Stop reason: HOSPADM

## 2018-08-23 RX ORDER — MEPERIDINE HYDROCHLORIDE 25 MG/ML
12.5 INJECTION INTRAMUSCULAR; INTRAVENOUS; SUBCUTANEOUS EVERY 5 MIN PRN
Status: DISCONTINUED | OUTPATIENT
Start: 2018-08-23 | End: 2018-08-23 | Stop reason: HOSPADM

## 2018-08-23 RX ORDER — BUPIVACAINE HYDROCHLORIDE 5 MG/ML
INJECTION, SOLUTION EPIDURAL; INTRACAUDAL PRN
Status: DISCONTINUED | OUTPATIENT
Start: 2018-08-23 | End: 2018-08-23 | Stop reason: HOSPADM

## 2018-08-23 RX ORDER — OXYCODONE HYDROCHLORIDE AND ACETAMINOPHEN 5; 325 MG/1; MG/1
1 TABLET ORAL PRN
Status: DISCONTINUED | OUTPATIENT
Start: 2018-08-23 | End: 2018-08-23 | Stop reason: HOSPADM

## 2018-08-23 RX ORDER — HYDROMORPHONE HCL 110MG/55ML
0.5 PATIENT CONTROLLED ANALGESIA SYRINGE INTRAVENOUS EVERY 5 MIN PRN
Status: DISCONTINUED | OUTPATIENT
Start: 2018-08-23 | End: 2018-08-23 | Stop reason: HOSPADM

## 2018-08-23 RX ORDER — OXYCODONE HYDROCHLORIDE AND ACETAMINOPHEN 5; 325 MG/1; MG/1
2 TABLET ORAL PRN
Status: DISCONTINUED | OUTPATIENT
Start: 2018-08-23 | End: 2018-08-23 | Stop reason: HOSPADM

## 2018-08-23 RX ORDER — LABETALOL HYDROCHLORIDE 5 MG/ML
INJECTION, SOLUTION INTRAVENOUS PRN
Status: DISCONTINUED | OUTPATIENT
Start: 2018-08-23 | End: 2018-08-23 | Stop reason: SDUPTHER

## 2018-08-23 RX ORDER — ROCURONIUM BROMIDE 10 MG/ML
INJECTION, SOLUTION INTRAVENOUS PRN
Status: DISCONTINUED | OUTPATIENT
Start: 2018-08-23 | End: 2018-08-23 | Stop reason: SDUPTHER

## 2018-08-23 RX ORDER — MAGNESIUM HYDROXIDE 1200 MG/15ML
LIQUID ORAL CONTINUOUS PRN
Status: DISCONTINUED | OUTPATIENT
Start: 2018-08-23 | End: 2018-08-23 | Stop reason: HOSPADM

## 2018-08-23 RX ORDER — LIDOCAINE HYDROCHLORIDE 10 MG/ML
1 INJECTION, SOLUTION EPIDURAL; INFILTRATION; INTRACAUDAL; PERINEURAL
Status: DISCONTINUED | OUTPATIENT
Start: 2018-08-23 | End: 2018-08-23 | Stop reason: HOSPADM

## 2018-08-23 RX ORDER — HYDRALAZINE HYDROCHLORIDE 20 MG/ML
5 INJECTION INTRAMUSCULAR; INTRAVENOUS EVERY 10 MIN PRN
Status: DISCONTINUED | OUTPATIENT
Start: 2018-08-23 | End: 2018-08-23 | Stop reason: HOSPADM

## 2018-08-23 RX ORDER — MIDAZOLAM HYDROCHLORIDE 1 MG/ML
INJECTION INTRAMUSCULAR; INTRAVENOUS PRN
Status: DISCONTINUED | OUTPATIENT
Start: 2018-08-23 | End: 2018-08-23 | Stop reason: SDUPTHER

## 2018-08-23 RX ORDER — DEXAMETHASONE SODIUM PHOSPHATE 4 MG/ML
INJECTION, SOLUTION INTRA-ARTICULAR; INTRALESIONAL; INTRAMUSCULAR; INTRAVENOUS; SOFT TISSUE PRN
Status: DISCONTINUED | OUTPATIENT
Start: 2018-08-23 | End: 2018-08-23 | Stop reason: SDUPTHER

## 2018-08-23 RX ORDER — FENTANYL CITRATE 50 UG/ML
INJECTION, SOLUTION INTRAMUSCULAR; INTRAVENOUS PRN
Status: DISCONTINUED | OUTPATIENT
Start: 2018-08-23 | End: 2018-08-23 | Stop reason: SDUPTHER

## 2018-08-23 RX ADMIN — ROCURONIUM BROMIDE 60 MG: 10 INJECTION, SOLUTION INTRAVENOUS at 12:55

## 2018-08-23 RX ADMIN — LABETALOL HYDROCHLORIDE 5 MG: 5 INJECTION INTRAVENOUS at 13:16

## 2018-08-23 RX ADMIN — FENTANYL CITRATE 100 MCG: 50 INJECTION INTRAMUSCULAR; INTRAVENOUS at 13:10

## 2018-08-23 RX ADMIN — PROPOFOL 300 MG: 10 INJECTION, EMULSION INTRAVENOUS at 12:55

## 2018-08-23 RX ADMIN — SUGAMMADEX 200 MG: 100 INJECTION, SOLUTION INTRAVENOUS at 14:12

## 2018-08-23 RX ADMIN — SODIUM CHLORIDE, POTASSIUM CHLORIDE, SODIUM LACTATE AND CALCIUM CHLORIDE: 600; 310; 30; 20 INJECTION, SOLUTION INTRAVENOUS at 11:31

## 2018-08-23 RX ADMIN — FENTANYL CITRATE 50 MCG: 50 INJECTION INTRAMUSCULAR; INTRAVENOUS at 12:55

## 2018-08-23 RX ADMIN — FENTANYL CITRATE 50 MCG: 50 INJECTION INTRAMUSCULAR; INTRAVENOUS at 12:49

## 2018-08-23 RX ADMIN — DEXAMETHASONE SODIUM PHOSPHATE 12 MG: 4 INJECTION, SOLUTION INTRAMUSCULAR; INTRAVENOUS at 13:05

## 2018-08-23 RX ADMIN — EPHEDRINE SULFATE 5 MG: 50 INJECTION INTRAVENOUS at 12:59

## 2018-08-23 RX ADMIN — MIDAZOLAM 2 MG: 1 INJECTION INTRAMUSCULAR; INTRAVENOUS at 12:49

## 2018-08-23 RX ADMIN — LIDOCAINE HYDROCHLORIDE 50 MG: 20 INJECTION, SOLUTION EPIDURAL; INFILTRATION; INTRACAUDAL; PERINEURAL at 12:55

## 2018-08-23 RX ADMIN — HEPARIN SODIUM 5000 UNITS: 5000 INJECTION INTRAVENOUS; SUBCUTANEOUS at 11:22

## 2018-08-23 RX ADMIN — Medication 2 G: at 12:47

## 2018-08-23 ASSESSMENT — PULMONARY FUNCTION TESTS
PIF_VALUE: 23
PIF_VALUE: 23
PIF_VALUE: 24
PIF_VALUE: 24
PIF_VALUE: 12
PIF_VALUE: 24
PIF_VALUE: 4
PIF_VALUE: 2
PIF_VALUE: 23
PIF_VALUE: 24
PIF_VALUE: 4
PIF_VALUE: 30
PIF_VALUE: 23
PIF_VALUE: 24
PIF_VALUE: 24
PIF_VALUE: 1
PIF_VALUE: 1
PIF_VALUE: 23
PIF_VALUE: 15
PIF_VALUE: 15
PIF_VALUE: 24
PIF_VALUE: 23
PIF_VALUE: 16
PIF_VALUE: 23
PIF_VALUE: 19
PIF_VALUE: 24
PIF_VALUE: 24
PIF_VALUE: 12
PIF_VALUE: 23
PIF_VALUE: 18
PIF_VALUE: 23
PIF_VALUE: 23
PIF_VALUE: 16
PIF_VALUE: 24
PIF_VALUE: 1
PIF_VALUE: 23
PIF_VALUE: 12
PIF_VALUE: 3
PIF_VALUE: 12
PIF_VALUE: 24
PIF_VALUE: 23
PIF_VALUE: 11
PIF_VALUE: 31
PIF_VALUE: 23
PIF_VALUE: 23
PIF_VALUE: 1
PIF_VALUE: 4
PIF_VALUE: 32
PIF_VALUE: 24
PIF_VALUE: 13
PIF_VALUE: 13
PIF_VALUE: 23
PIF_VALUE: 24
PIF_VALUE: 23
PIF_VALUE: 23
PIF_VALUE: 24
PIF_VALUE: 23
PIF_VALUE: 22
PIF_VALUE: 24
PIF_VALUE: 12
PIF_VALUE: 24
PIF_VALUE: 22
PIF_VALUE: 1
PIF_VALUE: 23
PIF_VALUE: 21
PIF_VALUE: 23
PIF_VALUE: 24
PIF_VALUE: 23
PIF_VALUE: 24
PIF_VALUE: 18
PIF_VALUE: 23
PIF_VALUE: 24
PIF_VALUE: 23
PIF_VALUE: 24
PIF_VALUE: 16

## 2018-08-23 ASSESSMENT — LIFESTYLE VARIABLES: SMOKING_STATUS: 0

## 2018-08-23 ASSESSMENT — PAIN SCALES - GENERAL: PAINLEVEL_OUTOF10: 0

## 2018-08-23 ASSESSMENT — PAIN - FUNCTIONAL ASSESSMENT: PAIN_FUNCTIONAL_ASSESSMENT: 0-10

## 2018-08-23 NOTE — ANESTHESIA POSTPROCEDURE EVALUATION
Department of Anesthesiology  Postprocedure Note    Patient: Gt Rose  MRN: [id number]  YOB: 1955  Date of evaluation: 8/23/2018    Procedure Summary     Date:  08/23/18 Room / Location:  [address] [place] [address]    Anesthesia Start:  4249 Anesthesia Stop:  4407    Procedure:  ROBOTIC INGUINAL HERNIA REPAIR WITH MESH (Bilateral Abdomen) Diagnosis:  (BILATERAL INGUINAL HERNIA)    Surgeon:  Elizabeth Quick MD Responsible Provider:  Bayron Saldaña MD    Anesthesia Type:  general ASA Status:  2     Anesthesia Type: general    Sherif Phase I: Sherif Score: 10    Sherif Phase II: Sherif Score: 10    Anesthesia Post Evaluation   Anesthetic Problems: no   Last Vitals:   BP: 139/67 Pulse: 57   Resp: 20 SpO2: 96   Temp: 97.2 °F (36.2 °C)     Cardiovascular System Stable: yes  Respiratory Function: Airway Patent yes  ETT no  Ventilator no  Level of consciousness: awake, alert and oriented  Post-op pain: adequate analgesia  Hydration Adequate: yes  Nausea/Vomiting:no  Other Issues:     Heavenly Herrera MD

## 2018-08-23 NOTE — PROGRESS NOTES
.Patient pre-op admission complete see flow sheet. IV started. IV antibiotic pulled and placed with chart Safety checks complete. Call light and family at bedside.

## 2018-08-24 NOTE — OP NOTE
attention to the left  side. The peritoneum was incised transversely on the left and the  preperitoneal space was entered and dissected out. He had a direct defect  as well as a lipoma of the cord that was reduced. The right side defect  was closed primarily. The large 3-D Max meshes were obtained and placed on  each side. They were secured to Ashu's ligament and the rectus muscle  with 0 Vicryl sutures. This gave us good coverage and the peritoneal  defects were then closed with 2-0 Stratafix sutures. The needles were  removed. There was no sign of bleeding. The patient tolerated the  procedure well and was taken to the recovery area in stable condition.         Na Guevara MD    D: 08/23/2018 14:28:13       T: 08/23/2018 14:30:16     [email or web address]  Job#: [id number]     Doc#: [id number]    CC:  Fadia Jay DO

## 2018-08-27 LAB
EKG ATRIAL RATE: 66 BPM
EKG DIAGNOSIS: NORMAL
EKG P AXIS: -17 DEGREES
EKG P-R INTERVAL: 164 MS
EKG Q-T INTERVAL: 412 MS
EKG QRS DURATION: 100 MS
EKG QTC CALCULATION (BAZETT): 431 MS
EKG R AXIS: -15 DEGREES
EKG T AXIS: 29 DEGREES
EKG VENTRICULAR RATE: 66 BPM

## 2018-09-06 ENCOUNTER — OFFICE VISIT (OUTPATIENT)
Dept: SURGERY | Age: 63
End: 2018-09-06

## 2018-09-06 VITALS
DIASTOLIC BLOOD PRESSURE: 80 MMHG | WEIGHT: 215 LBS | BODY MASS INDEX: 27.59 KG/M2 | HEIGHT: 74 IN | SYSTOLIC BLOOD PRESSURE: 126 MMHG

## 2018-09-06 DIAGNOSIS — Z98.890 POST-OPERATIVE STATE: Primary | ICD-10-CM

## 2018-09-06 PROCEDURE — 99024 POSTOP FOLLOW-UP VISIT: CPT | Performed by: SURGERY

## 2018-09-06 NOTE — PROGRESS NOTES
[facility] GENERAL SURGERY      S:   Patient presents s/p robotic bilateral inguinal hernia repair with mesh. He reports doing well. O:   Comfortable         Incision sites healing well. No sign of early hernia recurrence              A:   S/P robotic bilateral inguinal hernia repair with mesh    P:   Follow up as needed.

## 2018-11-28 ENCOUNTER — OFFICE VISIT (OUTPATIENT)
Dept: ORTHOPEDIC SURGERY | Age: 63
End: 2018-11-28
Payer: COMMERCIAL

## 2018-11-28 VITALS — BODY MASS INDEX: 27.59 KG/M2 | HEIGHT: 74 IN | WEIGHT: 214.95 LBS | RESPIRATION RATE: 16 BRPM

## 2018-11-28 DIAGNOSIS — M65.311 TRIGGER FINGER OF RIGHT THUMB: Primary | ICD-10-CM

## 2018-11-28 PROCEDURE — 20550 NJX 1 TENDON SHEATH/LIGAMENT: CPT | Performed by: ORTHOPAEDIC SURGERY

## 2018-11-28 PROCEDURE — 99212 OFFICE O/P EST SF 10 MIN: CPT | Performed by: ORTHOPAEDIC SURGERY

## 2019-07-22 DIAGNOSIS — Z00.00 ANNUAL PHYSICAL EXAM: ICD-10-CM

## 2019-07-22 DIAGNOSIS — Z00.00 ANNUAL PHYSICAL EXAM: Primary | ICD-10-CM

## 2019-07-22 LAB
A/G RATIO: 1.9 (ref 1.1–2.2)
ALBUMIN SERPL-MCNC: 4.1 G/DL (ref 3.4–5)
ALP BLD-CCNC: 87 U/L (ref 40–129)
ALT SERPL-CCNC: <5 U/L (ref 10–40)
ANION GAP SERPL CALCULATED.3IONS-SCNC: 12 MMOL/L (ref 3–16)
AST SERPL-CCNC: 15 U/L (ref 15–37)
BASOPHILS ABSOLUTE: 0.1 K/UL (ref 0–0.2)
BASOPHILS RELATIVE PERCENT: 1.1 %
BILIRUB SERPL-MCNC: 0.5 MG/DL (ref 0–1)
BILIRUBIN DIRECT: <0.2 MG/DL (ref 0–0.3)
BILIRUBIN, INDIRECT: NORMAL MG/DL (ref 0–1)
BUN BLDV-MCNC: 16 MG/DL (ref 7–20)
CALCIUM SERPL-MCNC: 9.5 MG/DL (ref 8.3–10.6)
CHLORIDE BLD-SCNC: 106 MMOL/L (ref 99–110)
CHOLESTEROL, TOTAL: 145 MG/DL (ref 0–199)
CO2: 27 MMOL/L (ref 21–32)
CREAT SERPL-MCNC: 1.1 MG/DL (ref 0.8–1.3)
EOSINOPHILS ABSOLUTE: 0.2 K/UL (ref 0–0.6)
EOSINOPHILS RELATIVE PERCENT: 5.3 %
GFR AFRICAN AMERICAN: >60
GFR NON-AFRICAN AMERICAN: >60
GLOBULIN: 2.2 G/DL
GLUCOSE BLD-MCNC: 102 MG/DL (ref 70–99)
HCT VFR BLD CALC: 43.1 % (ref 40.5–52.5)
HDLC SERPL-MCNC: 55 MG/DL (ref 40–60)
HEMOGLOBIN: 14.4 G/DL (ref 13.5–17.5)
LDL CHOLESTEROL CALCULATED: 76 MG/DL
LYMPHOCYTES ABSOLUTE: 1.6 K/UL (ref 1–5.1)
LYMPHOCYTES RELATIVE PERCENT: 33.3 %
MCH RBC QN AUTO: 31.7 PG (ref 26–34)
MCHC RBC AUTO-ENTMCNC: 33.5 G/DL (ref 31–36)
MCV RBC AUTO: 94.8 FL (ref 80–100)
MONOCYTES ABSOLUTE: 0.5 K/UL (ref 0–1.3)
MONOCYTES RELATIVE PERCENT: 10 %
NEUTROPHILS ABSOLUTE: 2.3 K/UL (ref 1.7–7.7)
NEUTROPHILS RELATIVE PERCENT: 50.3 %
PDW BLD-RTO: 12.8 % (ref 12.4–15.4)
PLATELET # BLD: 316 K/UL (ref 135–450)
PMV BLD AUTO: 7.9 FL (ref 5–10.5)
POTASSIUM SERPL-SCNC: 4.9 MMOL/L (ref 3.5–5.1)
PROSTATE SPECIFIC ANTIGEN: 0.73 NG/ML (ref 0–4)
RBC # BLD: 4.55 M/UL (ref 4.2–5.9)
SODIUM BLD-SCNC: 145 MMOL/L (ref 136–145)
TOTAL PROTEIN: 6.3 G/DL (ref 6.4–8.2)
TRIGL SERPL-MCNC: 68 MG/DL (ref 0–150)
VITAMIN D 25-HYDROXY: 58 NG/ML
VLDLC SERPL CALC-MCNC: 14 MG/DL
WBC # BLD: 4.7 K/UL (ref 4–11)

## 2019-07-24 ENCOUNTER — OFFICE VISIT (OUTPATIENT)
Dept: FAMILY MEDICINE CLINIC | Age: 64
End: 2019-07-24
Payer: COMMERCIAL

## 2019-07-24 VITALS
DIASTOLIC BLOOD PRESSURE: 86 MMHG | HEIGHT: 74 IN | SYSTOLIC BLOOD PRESSURE: 138 MMHG | WEIGHT: 212 LBS | OXYGEN SATURATION: 98 % | HEART RATE: 74 BPM | BODY MASS INDEX: 27.21 KG/M2

## 2019-07-24 DIAGNOSIS — Z00.00 ANNUAL PHYSICAL EXAM: Primary | ICD-10-CM

## 2019-07-24 PROCEDURE — 99396 PREV VISIT EST AGE 40-64: CPT | Performed by: FAMILY MEDICINE

## 2019-07-24 ASSESSMENT — PATIENT HEALTH QUESTIONNAIRE - PHQ9
SUM OF ALL RESPONSES TO PHQ9 QUESTIONS 1 & 2: 0
SUM OF ALL RESPONSES TO PHQ QUESTIONS 1-9: 0
2. FEELING DOWN, DEPRESSED OR HOPELESS: 0
1. LITTLE INTEREST OR PLEASURE IN DOING THINGS: 0
SUM OF ALL RESPONSES TO PHQ QUESTIONS 1-9: 0

## 2019-07-24 ASSESSMENT — ENCOUNTER SYMPTOMS
RESPIRATORY NEGATIVE: 1
DIARRHEA: 0
BACK PAIN: 1
EYES NEGATIVE: 1
CONSTIPATION: 0

## 2019-07-24 NOTE — PROGRESS NOTES
Positive for back pain. Lower back pain some. occ some referral to right thigh leg. Has to limit his bending / lifting. Neurological: Negative. Psychiatric/Behavioral: Negative. Objective:   Physical Exam   Constitutional: He is oriented to person, place, and time. He appears well-developed and well-nourished. No distress. HENT:   Head: Normocephalic. Right Ear: Hearing, tympanic membrane, external ear and ear canal normal. No decreased hearing is noted. Left Ear: Hearing, tympanic membrane, external ear and ear canal normal. No decreased hearing is noted. Nose: No mucosal edema or rhinorrhea. Mouth/Throat: Uvula is midline, oropharynx is clear and moist and mucous membranes are normal.   Eyes: Pupils are equal, round, and reactive to light. EOM are normal. No scleral icterus. Neck: Normal range of motion. Neck supple. Carotid bruit is not present. No thyromegaly present. Cardiovascular: Normal rate, regular rhythm, normal heart sounds and intact distal pulses. PMI is not displaced. No murmur heard. Pulmonary/Chest: Effort normal and breath sounds normal. No respiratory distress. He has no wheezes. He has no rales. Abdominal: Soft. Bowel sounds are normal. He exhibits no distension, no abdominal bruit and no mass. There is no hepatosplenomegaly. There is no tenderness. There is no CVA tenderness. No hernia. Musculoskeletal: He exhibits no edema or deformity. Lumbar motion some tight. Sciatic signs neg / back pain    Left knee hypertrophic. Tight  / flexion some limited   Lymphadenopathy:     He has no cervical adenopathy. Neurological: He is alert and oriented to person, place, and time. He has normal reflexes. No cranial nerve deficit. Coordination normal.   Skin: Skin is warm and dry. No rash noted. Psychiatric: He has a normal mood and affect. His behavior is normal. Thought content normal.   Vitals reviewed.       Assessment:      Annual physical        Plan:

## 2020-04-24 ENCOUNTER — E-VISIT (OUTPATIENT)
Dept: FAMILY MEDICINE CLINIC | Age: 65
End: 2020-04-24
Payer: COMMERCIAL

## 2020-04-24 PROCEDURE — 99421 OL DIG E/M SVC 5-10 MIN: CPT | Performed by: FAMILY MEDICINE

## 2020-04-24 RX ORDER — CLINDAMYCIN HYDROCHLORIDE 300 MG/1
300 CAPSULE ORAL 4 TIMES DAILY
Qty: 40 CAPSULE | Refills: 0 | Status: SHIPPED | OUTPATIENT
Start: 2020-04-24 | End: 2020-05-04

## 2020-04-24 RX ORDER — METHYLPREDNISOLONE 4 MG/1
TABLET ORAL
Qty: 1 KIT | Refills: 0 | Status: SHIPPED | OUTPATIENT
Start: 2020-04-24 | End: 2020-10-12

## 2020-05-08 ENCOUNTER — E-VISIT (OUTPATIENT)
Dept: FAMILY MEDICINE CLINIC | Age: 65
End: 2020-05-08

## 2020-05-15 ENCOUNTER — E-VISIT (OUTPATIENT)
Dept: FAMILY MEDICINE CLINIC | Age: 65
End: 2020-05-15
Payer: COMMERCIAL

## 2020-05-15 PROCEDURE — 99421 OL DIG E/M SVC 5-10 MIN: CPT | Performed by: NURSE PRACTITIONER

## 2020-05-15 RX ORDER — CYCLOBENZAPRINE HCL 10 MG
10 TABLET ORAL 3 TIMES DAILY PRN
Qty: 21 TABLET | Refills: 0 | Status: SHIPPED | OUTPATIENT
Start: 2020-05-15 | End: 2020-05-25

## 2020-05-15 RX ORDER — PREDNISONE 10 MG/1
10 TABLET ORAL 2 TIMES DAILY
Qty: 10 TABLET | Refills: 0 | Status: SHIPPED | OUTPATIENT
Start: 2020-05-15 | End: 2020-05-20

## 2020-09-04 ENCOUNTER — TELEPHONE (OUTPATIENT)
Dept: FAMILY MEDICINE CLINIC | Age: 65
End: 2020-09-04

## 2020-10-07 ENCOUNTER — NURSE ONLY (OUTPATIENT)
Dept: FAMILY MEDICINE CLINIC | Age: 65
End: 2020-10-07

## 2020-10-07 VITALS — TEMPERATURE: 96.7 F

## 2020-10-07 LAB
A/G RATIO: 1.7 (ref 1.1–2.2)
ALBUMIN SERPL-MCNC: 4.2 G/DL (ref 3.4–5)
ALP BLD-CCNC: 94 U/L (ref 40–129)
ALT SERPL-CCNC: 11 U/L (ref 10–40)
ANION GAP SERPL CALCULATED.3IONS-SCNC: 11 MMOL/L (ref 3–16)
AST SERPL-CCNC: 25 U/L (ref 15–37)
BASOPHILS ABSOLUTE: 0.1 K/UL (ref 0–0.2)
BASOPHILS RELATIVE PERCENT: 1 %
BILIRUB SERPL-MCNC: 0.6 MG/DL (ref 0–1)
BUN BLDV-MCNC: 18 MG/DL (ref 7–20)
CALCIUM SERPL-MCNC: 9.7 MG/DL (ref 8.3–10.6)
CHLORIDE BLD-SCNC: 102 MMOL/L (ref 99–110)
CHOLESTEROL, TOTAL: 186 MG/DL (ref 0–199)
CO2: 28 MMOL/L (ref 21–32)
CREAT SERPL-MCNC: 1.1 MG/DL (ref 0.8–1.3)
EOSINOPHILS ABSOLUTE: 0.2 K/UL (ref 0–0.6)
EOSINOPHILS RELATIVE PERCENT: 3.8 %
GFR AFRICAN AMERICAN: >60
GFR NON-AFRICAN AMERICAN: >60
GLOBULIN: 2.5 G/DL
GLUCOSE BLD-MCNC: 99 MG/DL (ref 70–99)
HCT VFR BLD CALC: 45.8 % (ref 40.5–52.5)
HDLC SERPL-MCNC: 66 MG/DL (ref 40–60)
HEMOGLOBIN: 15.6 G/DL (ref 13.5–17.5)
LDL CHOLESTEROL CALCULATED: 109 MG/DL
LYMPHOCYTES ABSOLUTE: 1.9 K/UL (ref 1–5.1)
LYMPHOCYTES RELATIVE PERCENT: 31.9 %
MCH RBC QN AUTO: 31.3 PG (ref 26–34)
MCHC RBC AUTO-ENTMCNC: 34 G/DL (ref 31–36)
MCV RBC AUTO: 92 FL (ref 80–100)
MONOCYTES ABSOLUTE: 0.6 K/UL (ref 0–1.3)
MONOCYTES RELATIVE PERCENT: 10.2 %
NEUTROPHILS ABSOLUTE: 3.1 K/UL (ref 1.7–7.7)
NEUTROPHILS RELATIVE PERCENT: 53.1 %
PDW BLD-RTO: 12.6 % (ref 12.4–15.4)
PLATELET # BLD: 320 K/UL (ref 135–450)
PMV BLD AUTO: 8.3 FL (ref 5–10.5)
POTASSIUM SERPL-SCNC: 5.5 MMOL/L (ref 3.5–5.1)
RBC # BLD: 4.98 M/UL (ref 4.2–5.9)
SODIUM BLD-SCNC: 141 MMOL/L (ref 136–145)
TOTAL PROTEIN: 6.7 G/DL (ref 6.4–8.2)
TRIGL SERPL-MCNC: 56 MG/DL (ref 0–150)
TSH REFLEX: 2.1 UIU/ML (ref 0.27–4.2)
VLDLC SERPL CALC-MCNC: 11 MG/DL
WBC # BLD: 5.8 K/UL (ref 4–11)

## 2020-10-12 ENCOUNTER — OFFICE VISIT (OUTPATIENT)
Dept: FAMILY MEDICINE CLINIC | Age: 65
End: 2020-10-12
Payer: COMMERCIAL

## 2020-10-12 VITALS
WEIGHT: 214 LBS | TEMPERATURE: 97.9 F | HEIGHT: 74 IN | RESPIRATION RATE: 14 BRPM | DIASTOLIC BLOOD PRESSURE: 78 MMHG | SYSTOLIC BLOOD PRESSURE: 134 MMHG | HEART RATE: 72 BPM | OXYGEN SATURATION: 96 % | BODY MASS INDEX: 27.46 KG/M2

## 2020-10-12 PROCEDURE — 99396 PREV VISIT EST AGE 40-64: CPT | Performed by: FAMILY MEDICINE

## 2020-10-12 PROCEDURE — 90471 IMMUNIZATION ADMIN: CPT | Performed by: FAMILY MEDICINE

## 2020-10-12 PROCEDURE — 90715 TDAP VACCINE 7 YRS/> IM: CPT | Performed by: FAMILY MEDICINE

## 2020-10-12 RX ORDER — LORAZEPAM 0.5 MG
1 TABLET ORAL DAILY
COMMUNITY

## 2020-10-12 ASSESSMENT — PATIENT HEALTH QUESTIONNAIRE - PHQ9
2. FEELING DOWN, DEPRESSED OR HOPELESS: 0
1. LITTLE INTEREST OR PLEASURE IN DOING THINGS: 0
SUM OF ALL RESPONSES TO PHQ9 QUESTIONS 1 & 2: 0
SUM OF ALL RESPONSES TO PHQ QUESTIONS 1-9: 0
SUM OF ALL RESPONSES TO PHQ QUESTIONS 1-9: 0

## 2020-10-12 ASSESSMENT — ENCOUNTER SYMPTOMS
RESPIRATORY NEGATIVE: 1
GASTROINTESTINAL NEGATIVE: 1
SORE THROAT: 0
TROUBLE SWALLOWING: 0

## 2020-10-12 NOTE — PATIENT INSTRUCTIONS
Continue the current program and meds and supplements    Get a flu vaccine.     Call the ortho doc for an appointment

## 2020-10-12 NOTE — PROGRESS NOTES
Subjective:      Patient ID: Elizabeth Lycnh is a 59 y.o. y.o. male. Here for annual exam  There is no interval hx of hospitalization or significant illness  meds and hx reviewed. Overall has been OK.     HPI      Chief Complaint   Patient presents with    Annual Exam     still having soreness in his shoulders and Left Knee       Allergies   Allergen Reactions    Naprosyn [Naproxen] Rash       Past Medical History:   Diagnosis Date    Allergic rhinitis     Arthritis     Osteoarthritis        Past Surgical History:   Procedure Laterality Date    BONE GRAFT      rt wrist  age 27   [facility] [address] Right 03/14/2017    CARPAL TUNNEL RELEASE Left 04/11/2017    with excision volar wrist ganglion    COLONOSCOPY  2008    no polyps    COLONOSCOPY  02/26/2018    diverticulosis    HAND SURGERY Left 12/12/2017    Proximal row carpectomy    HERNIA REPAIR Bilateral 08/23/2018     ROBOTIC INGUINAL HERNIA REPAIR WITH MESH    JOINT REPLACEMENT  2008    rt total hip    KNEE ARTHROSCOPY Left 8/20/2014    LEFT KNEE ARTHROSCOPY, CHONDROPLASTY, SYNOVECTOMY, PARTIAL    ME REPAIR ING HERNIA,5+Y/O,REDUCIBL Bilateral 8/23/2018    ROBOTIC INGUINAL HERNIA REPAIR WITH MESH performed by Kayla Connell MD at [place] [address] ARTHROSCOPY Left 10/28/14    debridement,rotator cuff repair    TONSILLECTOMY AND ADENOIDECTOMY Bilateral 1960       Social History     Socioeconomic History    Marital status: Single     Spouse name: Not on file    Number of children: Not on file    Years of education: Not on file    Highest education level: Not on file   Occupational History    Not on file   Social Needs    Financial resource strain: Not on file    Food insecurity     Worry: Not on file     Inability: Not on file    Transportation needs     Medical: Not on file     Non-medical: Not on file   Tobacco Use    Smoking status: Never Smoker    Smokeless tobacco: Never Used   Substance and Sexual Activity    Alcohol use: Yes     Alcohol/week: 0.0 standard drinks     Comment: 1-3 beers a week.  Drug use: No    Sexual activity: Yes   Lifestyle    Physical activity     Days per week: Not on file     Minutes per session: Not on file    Stress: Not on file   Relationships    Social connections     Talks on phone: Not on file     Gets together: Not on file     Attends Hindu service: Not on file     Active member of club or organization: Not on file     Attends meetings of clubs or organizations: Not on file     Relationship status: Not on file    Intimate partner violence     Fear of current or ex partner: Not on file     Emotionally abused: Not on file     Physically abused: Not on file     Forced sexual activity: Not on file   Other Topics Concern    Not on file   Social History Narrative    Not on file       Family History   Problem Relation Age of Onset    Cancer Mother         skin cancer    High Blood Pressure Mother     Cancer Father         bladder    High Blood Pressure Father     No Known Problems Maternal Grandmother     No Known Problems Maternal Grandfather     Cancer Paternal Grandmother         leukemia    No Known Problems Paternal Grandfather     Diabetes Neg Hx     Heart Disease Neg Hx        Vitals:    10/12/20 0848   BP: 134/78   Pulse: 72   Resp: 14   Temp: 97.9 °F (36.6 °C)   SpO2: 96%       Wt Readings from Last 3 Encounters:   10/12/20 214 lb (97.1 kg)   07/24/19 212 lb (96.2 kg)   11/28/18 214 lb 15.2 oz (97.5 kg)       Review of Systems   Constitutional: Negative. HENT: Negative for sore throat and trouble swallowing. Respiratory: Negative. Cardiovascular: Negative. Gastrointestinal: Negative. Genitourinary: Negative. Musculoskeletal:        Left knee pain and deformity interior aspect . Sore-      Neurological: Negative. Psychiatric/Behavioral: Negative. Objective:   Physical Exam  Vitals signs reviewed.    Constitutional:       Appearance: He is not ill-appearing. Eyes:      General: No scleral icterus. Extraocular Movements: Extraocular movements intact. Neck:      Vascular: No carotid bruit. Cardiovascular:      Rate and Rhythm: Normal rate and regular rhythm. Pulses: Normal pulses. Heart sounds: Normal heart sounds. Pulmonary:      Effort: Pulmonary effort is normal.      Breath sounds: Normal breath sounds. No rhonchi. Abdominal:      General: Abdomen is flat. Palpations: Abdomen is soft. Musculoskeletal:      Right lower leg: No edema. Left lower leg: No edema. Comments: Left knee hypertrophic and crepitant   Lymphadenopathy:      Cervical: No cervical adenopathy. Skin:     General: Skin is warm and dry. Neurological:      General: No focal deficit present. Mental Status: He is alert and oriented to person, place, and time. Psychiatric:         Mood and Affect: Mood normal.         Behavior: Behavior normal.         Thought Content: Thought content normal.         Assessment:       Diagnosis Orders   1. Need for prophylactic vaccination against diphtheria-tetanus-pertussis (DTP)  Tdap (age 10y-63y) IM (Adacel)     Physical  hyperacidity   DJD left knee      Plan:     Labs reviewed. Good numbers. Knee discussed and considerations discussed. Ortho follow-up recommended    rec flu vaccine at work    Follow 1 year      Current Outpatient Medications   Medication Sig Dispense Refill    Misc Natural Products (TART CHERRY ADVANCED) CAPS Take by mouth      famotidine (PEPCID) 20 MG tablet Take 1 tablet by mouth 2 times daily (Patient taking differently: Take 20 mg by mouth as needed ) 60 tablet 3    LUTEIN PO Take by mouth      Multiple Vitamins-Minerals (VITEYES AREDS FORMULA PO) Take by mouth      Omega-3 Fatty Acids (OMEGA 3 PO) Take by mouth daily       calcium carbonate (OSCAL) 500 MG TABS tablet Take 500 mg by mouth daily.       Glucosamine-Chondroitin (GLUCOSAMINE CHONDR COMPLEX PO) Take by mouth daily        No current facility-administered medications for this visit.

## 2021-10-14 ENCOUNTER — TELEPHONE (OUTPATIENT)
Dept: FAMILY MEDICINE CLINIC | Age: 66
End: 2021-10-14

## 2021-10-14 NOTE — TELEPHONE ENCOUNTER
----- Message from Addison Yanez sent at 10/14/2021  4:51 PM EDT -----  Subject: Referral Request    QUESTIONS   Reason for referral request? Patient would like to get labs drawn before   his AWV on 11/4/2021. Please let him know when he can get this done. Has the physician seen you for this condition before? No   Preferred Specialist (if applicable)? Do you already have an appointment scheduled? Yes  Select Scheduled Date? 2021-11-04  Select Scheduled Physician? Additional Information for Provider?   ---------------------------------------------------------------------------  --------------  CALL BACK INFO  What is the best way for the office to contact you? OK to leave message on   voicemail  Preferred Call Back Phone Number?  [phone]

## 2021-10-15 NOTE — TELEPHONE ENCOUNTER
Left detailed message on recorder asked to call us back to let us know if he is new to Medicare now?

## 2021-10-18 DIAGNOSIS — Z00.00 ANNUAL PHYSICAL EXAM: Primary | ICD-10-CM

## 2021-10-18 DIAGNOSIS — M19.049 CMC ARTHRITIS: ICD-10-CM

## 2021-11-01 DIAGNOSIS — Z00.00 ANNUAL PHYSICAL EXAM: ICD-10-CM

## 2021-11-01 LAB
A/G RATIO: 1.8 (ref 1.1–2.2)
ALBUMIN SERPL-MCNC: 4.3 G/DL (ref 3.4–5)
ALP BLD-CCNC: 90 U/L (ref 40–129)
ALT SERPL-CCNC: 8 U/L (ref 10–40)
ANION GAP SERPL CALCULATED.3IONS-SCNC: 14 MMOL/L (ref 3–16)
AST SERPL-CCNC: 19 U/L (ref 15–37)
BASOPHILS ABSOLUTE: 0.1 K/UL (ref 0–0.2)
BASOPHILS RELATIVE PERCENT: 0.9 %
BILIRUB SERPL-MCNC: 0.6 MG/DL (ref 0–1)
BUN BLDV-MCNC: 13 MG/DL (ref 7–20)
CALCIUM SERPL-MCNC: 9.6 MG/DL (ref 8.3–10.6)
CHLORIDE BLD-SCNC: 101 MMOL/L (ref 99–110)
CHOLESTEROL, TOTAL: 195 MG/DL (ref 0–199)
CO2: 25 MMOL/L (ref 21–32)
CREAT SERPL-MCNC: 0.9 MG/DL (ref 0.8–1.3)
EOSINOPHILS ABSOLUTE: 0.2 K/UL (ref 0–0.6)
EOSINOPHILS RELATIVE PERCENT: 4.1 %
GFR AFRICAN AMERICAN: >60
GFR NON-AFRICAN AMERICAN: >60
GLUCOSE BLD-MCNC: 88 MG/DL (ref 70–99)
HCT VFR BLD CALC: 46.9 % (ref 40.5–52.5)
HDLC SERPL-MCNC: 56 MG/DL (ref 40–60)
HEMOGLOBIN: 15.5 G/DL (ref 13.5–17.5)
LDL CHOLESTEROL CALCULATED: 117 MG/DL
LYMPHOCYTES ABSOLUTE: 2 K/UL (ref 1–5.1)
LYMPHOCYTES RELATIVE PERCENT: 33 %
MCH RBC QN AUTO: 31 PG (ref 26–34)
MCHC RBC AUTO-ENTMCNC: 33.1 G/DL (ref 31–36)
MCV RBC AUTO: 93.6 FL (ref 80–100)
MONOCYTES ABSOLUTE: 0.6 K/UL (ref 0–1.3)
MONOCYTES RELATIVE PERCENT: 9.3 %
NEUTROPHILS ABSOLUTE: 3.2 K/UL (ref 1.7–7.7)
NEUTROPHILS RELATIVE PERCENT: 52.7 %
PDW BLD-RTO: 12.5 % (ref 12.4–15.4)
PLATELET # BLD: 293 K/UL (ref 135–450)
PMV BLD AUTO: 8.2 FL (ref 5–10.5)
POTASSIUM SERPL-SCNC: 4.3 MMOL/L (ref 3.5–5.1)
PROSTATE SPECIFIC ANTIGEN: 0.84 NG/ML (ref 0–4)
RBC # BLD: 5.01 M/UL (ref 4.2–5.9)
SODIUM BLD-SCNC: 140 MMOL/L (ref 136–145)
TOTAL PROTEIN: 6.7 G/DL (ref 6.4–8.2)
TRIGL SERPL-MCNC: 111 MG/DL (ref 0–150)
VITAMIN D 25-HYDROXY: 60.4 NG/ML
VLDLC SERPL CALC-MCNC: 22 MG/DL
WBC # BLD: 6 K/UL (ref 4–11)

## 2021-11-02 ASSESSMENT — LIFESTYLE VARIABLES
HOW OFTEN DURING THE LAST YEAR HAVE YOU FOUND THAT YOU WERE NOT ABLE TO STOP DRINKING ONCE YOU HAD STARTED: 0
AUDIT TOTAL SCORE: 0
HOW OFTEN DO YOU HAVE SIX OR MORE DRINKS ON ONE OCCASION: NEVER
HOW OFTEN DO YOU HAVE A DRINK CONTAINING ALCOHOL: TWO TO FOUR TIMES A MONTH
HOW OFTEN DURING THE LAST YEAR HAVE YOU HAD A FEELING OF GUILT OR REMORSE AFTER DRINKING: 0
HOW OFTEN DURING THE LAST YEAR HAVE YOU NEEDED AN ALCOHOLIC DRINK FIRST THING IN THE MORNING TO GET YOURSELF GOING AFTER A NIGHT OF HEAVY DRINKING: 0
HAVE YOU OR SOMEONE ELSE BEEN INJURED AS A RESULT OF YOUR DRINKING: NO
AUDIT TOTAL SCORE: 2
HOW OFTEN DO YOU HAVE A DRINK CONTAINING ALCOHOL: 2
HOW OFTEN DURING THE LAST YEAR HAVE YOU FOUND THAT YOU WERE NOT ABLE TO STOP DRINKING ONCE YOU HAD STARTED: NEVER
HAVE YOU OR SOMEONE ELSE BEEN INJURED AS A RESULT OF YOUR DRINKING: 0
HOW OFTEN DURING THE LAST YEAR HAVE YOU BEEN UNABLE TO REMEMBER WHAT HAPPENED THE NIGHT BEFORE BECAUSE YOU HAD BEEN DRINKING: 0
HOW OFTEN DURING THE LAST YEAR HAVE YOU NEEDED AN ALCOHOLIC DRINK FIRST THING IN THE MORNING TO GET YOURSELF GOING AFTER A NIGHT OF HEAVY DRINKING: NEVER
HOW OFTEN DURING THE LAST YEAR HAVE YOU BEEN UNABLE TO REMEMBER WHAT HAPPENED THE NIGHT BEFORE BECAUSE YOU HAD BEEN DRINKING: NEVER
AUDIT-C TOTAL SCORE: 0
HOW MANY STANDARD DRINKS CONTAINING ALCOHOL DO YOU HAVE ON A TYPICAL DAY: 0
HOW OFTEN DURING THE LAST YEAR HAVE YOU FAILED TO DO WHAT WAS NORMALLY EXPECTED FROM YOU BECAUSE OF DRINKING: 0
HOW OFTEN DO YOU HAVE SIX OR MORE DRINKS ON ONE OCCASION: 0
HOW OFTEN DURING THE LAST YEAR HAVE YOU FAILED TO DO WHAT WAS NORMALLY EXPECTED FROM YOU BECAUSE OF DRINKING: NEVER
HOW MANY STANDARD DRINKS CONTAINING ALCOHOL DO YOU HAVE ON A TYPICAL DAY: ONE OR TWO
HAS A RELATIVE, FRIEND, DOCTOR, OR ANOTHER HEALTH PROFESSIONAL EXPRESSED CONCERN ABOUT YOUR DRINKING OR SUGGESTED YOU CUT DOWN: NO
HAS A RELATIVE, FRIEND, DOCTOR, OR ANOTHER HEALTH PROFESSIONAL EXPRESSED CONCERN ABOUT YOUR DRINKING OR SUGGESTED YOU CUT DOWN: 0
AUDIT-C TOTAL SCORE: 2
HOW OFTEN DURING THE LAST YEAR HAVE YOU HAD A FEELING OF GUILT OR REMORSE AFTER DRINKING: NEVER

## 2021-11-02 ASSESSMENT — PATIENT HEALTH QUESTIONNAIRE - PHQ9
1. LITTLE INTEREST OR PLEASURE IN DOING THINGS: 0
SUM OF ALL RESPONSES TO PHQ QUESTIONS 1-9: 0
SUM OF ALL RESPONSES TO PHQ QUESTIONS 1-9: 0
SUM OF ALL RESPONSES TO PHQ9 QUESTIONS 1 & 2: 0
2. FEELING DOWN, DEPRESSED OR HOPELESS: 0
SUM OF ALL RESPONSES TO PHQ QUESTIONS 1-9: 0

## 2021-11-04 ENCOUNTER — OFFICE VISIT (OUTPATIENT)
Dept: FAMILY MEDICINE CLINIC | Age: 66
End: 2021-11-04
Payer: COMMERCIAL

## 2021-11-04 VITALS
WEIGHT: 219 LBS | TEMPERATURE: 96.5 F | OXYGEN SATURATION: 98 % | RESPIRATION RATE: 16 BRPM | HEIGHT: 74 IN | BODY MASS INDEX: 28.11 KG/M2 | HEART RATE: 72 BPM | SYSTOLIC BLOOD PRESSURE: 116 MMHG | DIASTOLIC BLOOD PRESSURE: 78 MMHG

## 2021-11-04 DIAGNOSIS — E78.00 ELEVATED LDL CHOLESTEROL LEVEL: ICD-10-CM

## 2021-11-04 DIAGNOSIS — Z00.00 ROUTINE GENERAL MEDICAL EXAMINATION AT A HEALTH CARE FACILITY: Primary | ICD-10-CM

## 2021-11-04 DIAGNOSIS — M17.12 PRIMARY OSTEOARTHRITIS OF LEFT KNEE: ICD-10-CM

## 2021-11-04 DIAGNOSIS — Z23 IMMUNIZATION DUE: ICD-10-CM

## 2021-11-04 PROCEDURE — 90471 IMMUNIZATION ADMIN: CPT | Performed by: NURSE PRACTITIONER

## 2021-11-04 PROCEDURE — 90732 PPSV23 VACC 2 YRS+ SUBQ/IM: CPT | Performed by: NURSE PRACTITIONER

## 2021-11-04 PROCEDURE — 99397 PER PM REEVAL EST PAT 65+ YR: CPT | Performed by: NURSE PRACTITIONER

## 2021-11-04 SDOH — ECONOMIC STABILITY: INCOME INSECURITY: IN THE LAST 12 MONTHS, WAS THERE A TIME WHEN YOU WERE NOT ABLE TO PAY THE MORTGAGE OR RENT ON TIME?: NO

## 2021-11-04 SDOH — ECONOMIC STABILITY: TRANSPORTATION INSECURITY
IN THE PAST 12 MONTHS, HAS THE LACK OF TRANSPORTATION KEPT YOU FROM MEDICAL APPOINTMENTS OR FROM GETTING MEDICATIONS?: NO

## 2021-11-04 SDOH — ECONOMIC STABILITY: HOUSING INSECURITY
IN THE LAST 12 MONTHS, WAS THERE A TIME WHEN YOU DID NOT HAVE A STEADY PLACE TO SLEEP OR SLEPT IN A SHELTER (INCLUDING NOW)?: NO

## 2021-11-04 SDOH — ECONOMIC STABILITY: FOOD INSECURITY: WITHIN THE PAST 12 MONTHS, THE FOOD YOU BOUGHT JUST DIDN'T LAST AND YOU DIDN'T HAVE MONEY TO GET MORE.: NEVER TRUE

## 2021-11-04 SDOH — ECONOMIC STABILITY: FOOD INSECURITY: WITHIN THE PAST 12 MONTHS, YOU WORRIED THAT YOUR FOOD WOULD RUN OUT BEFORE YOU GOT MONEY TO BUY MORE.: NEVER TRUE

## 2021-11-04 SDOH — ECONOMIC STABILITY: TRANSPORTATION INSECURITY
IN THE PAST 12 MONTHS, HAS LACK OF TRANSPORTATION KEPT YOU FROM MEETINGS, WORK, OR FROM GETTING THINGS NEEDED FOR DAILY LIVING?: NO

## 2021-11-04 ASSESSMENT — ENCOUNTER SYMPTOMS
DIARRHEA: 0
SHORTNESS OF BREATH: 0
VOMITING: 0
NAUSEA: 0
COUGH: 0

## 2021-11-04 ASSESSMENT — SOCIAL DETERMINANTS OF HEALTH (SDOH): HOW HARD IS IT FOR YOU TO PAY FOR THE VERY BASICS LIKE FOOD, HOUSING, MEDICAL CARE, AND HEATING?: NOT HARD AT ALL

## 2021-11-04 NOTE — PROGRESS NOTES
2021    Chief Complaint   Patient presents with   [employer] Annual Exam     Caryl Howard (:  1955) is a 72 y.o. male, here for a preventive medicine evaluation. No new concerns  Still having left knee pain- did not f/u on ortho referral from last visit  Would like new referral  Reviewed labs from 2021  Cholesterol up a bit- . Working a lot of overtime and not exercising      Patient Active Problem List   Diagnosis    Pain in joint, lower leg left    Pain in joint, shoulder region left    Neck pain    Neck arthritis C5 6 and 7    Tendinitis of left rotator cuff    Left rotator cuff tear    Status post right hip replacement     Left knee DJD    Chondromalacia patellae of left knee    Arthritis of left acromioclavicular joint    Tear of lateral meniscus of left knee    Acquired valgus deformity left knee    Tear of medial meniscus of left knee    Biceps tendinitis on left    Labral tear of left shoulder    S/P left knee arthroscopy, chondroplasty, synovectomy, removal loose bodies, and partial medial and lateral meniscectomy 2014    S/P left rotator cuff repair, arthroscopy, debridement of rotator cuff, Taylor, Neer, and biceps tenotomy 10/28/2014    CMC arthritis    SLAC (scapholunate advanced collapse) of wrist    Carpal tunnel syndrome, bilateral    Wrist arthritis    Perennial allergic rhinitis    Left wrist pain    Bilateral inguinal hernia without obstruction or gangrene       Review of Systems   Constitutional: Negative for fatigue and fever. Respiratory: Negative for cough and shortness of breath. Cardiovascular: Negative for chest pain and leg swelling. Gastrointestinal: Negative for diarrhea, nausea and vomiting. Musculoskeletal: Positive for arthralgias. Neurological: Negative for dizziness and headaches. Prior to Visit Medications    Medication Sig Taking?  Authorizing Provider   Misc Natural Products (TART CHERRY ADVANCED) CAPS Take by mouth Yes Historical Provider, MD   famotidine (PEPCID) 20 MG tablet Take 1 tablet by mouth 2 times daily  Patient taking differently: Take 20 mg by mouth as needed  Yes Suhail Ar, DO   LUTEIN PO Take by mouth Yes Historical Provider, MD   Multiple Vitamins-Minerals (VITEYES AREDS FORMULA PO) Take by mouth Yes Historical Provider, MD   Omega-3 Fatty Acids (OMEGA 3 PO) Take by mouth daily  Yes Historical Provider, MD   calcium carbonate (OSCAL) 500 MG TABS tablet Take 500 mg by mouth daily.  Yes Historical Provider, MD   Glucosamine-Chondroitin (GLUCOSAMINE CHONDR COMPLEX PO) Take by mouth daily  Yes Historical Provider, MD        Allergies   Allergen Reactions    Naprosyn [Naproxen] Rash       Past Medical History:   Diagnosis Date    Allergic rhinitis     Arthritis     Osteoarthritis        Past Surgical History:   Procedure Laterality Date    BONE GRAFT      rt wrist  age 27   [place] CARPAL TUNNEL RELEASE Right 03/14/2017    CARPAL TUNNEL RELEASE Left 04/11/2017    with excision volar wrist ganglion    COLONOSCOPY  2008    no polyps    COLONOSCOPY  02/26/2018    diverticulosis    HAND SURGERY Left 12/12/2017    Proximal row carpectomy    HERNIA REPAIR Bilateral 08/23/2018     ROBOTIC [place] JOINT REPLACEMENT  2008    rt total hip    KNEE ARTHROSCOPY Left 8/20/2014    LEFT KNEE ARTHROSCOPY, CHONDROPLASTY, SYNOVECTOMY, PARTIAL    NC REPAIR ING HERNIA,5+Y/O,REDUCIBL Bilateral 8/23/2018    ROBOTIC INGUINAL HERNIA REPAIR WITH MESH performed by Bernardino Dawkins MD at [place] [address] ARTHROSCOPY Left 10/28/14    debridement,rotator cuff repair    TONSILLECTOMY AND ADENOIDECTOMY Bilateral 1960       Social History     Socioeconomic History    Marital status: Single     Spouse name: Not on file    Number of children: Not on file    Years of education: Not on file    Highest education level: Not on file   Occupational History    Not on file   Tobacco Use    Smoking status: Never Smoker    Smokeless tobacco: Never Used   Vaping Use    Vaping Use: Never used   Substance and Sexual Activity    Alcohol use: Yes     Alcohol/week: 0.0 standard drinks     Comment: 1-3 beers a week.  Drug use: No    Sexual activity: Yes   Other Topics Concern    Not on file   Social History Narrative    Not on file     Social Determinants of Health     Financial Resource Strain: Low Risk     Difficulty of Paying Living Expenses: Not hard at all   Food Insecurity: No Food Insecurity    Worried About Running Out of Food in the Last Year: Never true    [place] of Food in the Last Year: Never true   Transportation Needs: No Transportation Needs    Lack of Transportation (Medical): No    Lack of Transportation (Non-Medical):  No   Physical Activity:     Days of Exercise per Week:     Minutes of Exercise per Session:    Stress:     Feeling of Stress :    Social Connections:     Frequency of Communication with Friends and Family:     Frequency of Social Gatherings with Friends and Family:     Attends Latter day Services:     Active Member of Clubs or Organizations:     Attends Club or Organization Meetings:     Marital Status:    Intimate Partner Violence:     Fear of Current or Ex-Partner:     Emotionally Abused:     Physically Abused:     Sexually Abused:         Family History   Problem Relation Age of Onset    Cancer Mother         skin cancer    High Blood Pressure Mother     Cancer Father         bladder    High Blood Pressure Father     No Known Problems Maternal Grandmother     No Known Problems Maternal Grandfather     Cancer Paternal Grandmother         leukemia    No Known Problems Paternal Grandfather     Diabetes Neg Hx     Heart Disease Neg Hx        ADVANCE DIRECTIVE: N, <no information>    Vitals:    11/04/21 0745   BP: 116/78   Site: Left Upper Arm   Position: Sitting   Pulse: 72   Resp: 16   Temp: 96.5 °F (35.8 °C)   TempSrc: Temporal   SpO2: 98% Weight: 219 lb (99.3 kg)   Height: 6' 2\" (1.88 m)     Estimated body mass index is 28.12 kg/m² as calculated from the following:    Height as of this encounter: 6' 2\" (1.88 m). Weight as of this encounter: 219 lb (99.3 kg). Physical Exam  Vitals and nursing note reviewed. Constitutional:       General: He is not in acute distress. Appearance: Normal appearance. He is well-developed. He is not ill-appearing, toxic-appearing or diaphoretic. HENT:      Head: Normocephalic and atraumatic. Right Ear: Tympanic membrane, ear canal and external ear normal. There is no impacted cerumen. Left Ear: Tympanic membrane, ear canal and external ear normal. There is no impacted cerumen. Mouth/Throat:      Mouth: Mucous membranes are moist.      Pharynx: Oropharynx is clear. Eyes:      General: No scleral icterus. Right eye: No discharge. Left eye: No discharge. Extraocular Movements: Extraocular movements intact. Conjunctiva/sclera: Conjunctivae normal.      Pupils: Pupils are equal, round, and reactive to light. Neck:      Vascular: No carotid bruit. Cardiovascular:      Rate and Rhythm: Normal rate and regular rhythm. Heart sounds: Normal heart sounds. No murmur heard. No friction rub. No gallop. Pulmonary:      Effort: Pulmonary effort is normal. No respiratory distress. Breath sounds: Normal breath sounds. No stridor. No wheezing, rhonchi or rales. Abdominal:      General: Bowel sounds are normal. There is no distension. Palpations: Abdomen is soft. There is no mass. Tenderness: There is no abdominal tenderness. There is no guarding. Hernia: No hernia is present. Musculoskeletal:      Cervical back: Normal range of motion and neck supple. No rigidity or tenderness. Lymphadenopathy:      Cervical: No cervical adenopathy. Skin:     General: Skin is warm and dry. Coloration: Skin is not jaundiced or pale.    Neurological: General: No focal deficit present. Mental Status: He is alert and oriented to person, place, and time. Mental status is at baseline. Cranial Nerves: No cranial nerve deficit. No flowsheet data found. Lab Results   Component Value Date    CHOL 195 11/01/2021    CHOL 186 10/07/2020    CHOL 145 07/22/2019    TRIG 111 11/01/2021    TRIG 56 10/07/2020    TRIG 68 07/22/2019    HDL 56 11/01/2021    HDL 66 10/07/2020    HDL 55 07/22/2019    LDLCALC 117 11/01/2021    LDLCALC 109 10/07/2020    LDLCALC 76 07/22/2019    GLUCOSE 88 11/01/2021    LABA1C 4.8 07/20/2018     The 10-year ASCVD risk score (Tyler Councilman., et al., 2013) is: 10%    Values used to calculate the score:      Age: 72 years      Sex: Male      Is Non- : No      Diabetic: No      Tobacco smoker: No      Systolic Blood Pressure: 533 mmHg      Is BP treated: No      HDL Cholesterol: 56 mg/dL      Total Cholesterol: 195 mg/dL    Immunization History   Administered Date(s) Administered    COVID-19, Pfizer, PF, 30mcg/0.3mL 06/05/2021, 06/26/2021    Influenza, High Dose (Fluzone 65 yrs and older) 10/28/2021    Pneumococcal Polysaccharide ([id number]) 11/04/2021    Tdap (Boostrix, Adacel) 10/12/2020    Zoster Live (Zostavax) 09/01/2017       Health Maintenance   Topic Date Due    Hepatitis C screen  Never done    HIV screen  Never done    Shingles Vaccine (2 of 3) 10/27/2017    COVID-19 Vaccine (3 - Pfizer booster) 12/26/2021    Lipid screen  11/01/2026    Colon cancer screen colonoscopy  02/26/2028    DTaP/Tdap/Td vaccine (3 - Td or Tdap) 10/12/2030    Flu vaccine  Completed    Pneumococcal 65+ years Vaccine  Completed    Hepatitis A vaccine  Aged Out    Hepatitis B vaccine  Aged Out    Hib vaccine  Aged Out    Meningococcal (ACWY) vaccine  Aged Out          ASSESSMENT/PLAN:  1. Routine general medical examination at a health care facility  2.  Primary osteoarthritis of left knee  -     [address] [facility], MD Arvind, Orthopedic Surgery, [facility]  3. Elevated LDL cholesterol level  4. Immunization due  -     PNEUMOVAX 23 subcutaneous/IM (Pneumococcal polysaccharide vaccine 23-valent >= 1yo)    1.) Referred to ortho for chronic left knee pain  2.) Discussed LDL- increase exercise      An electronic signature was used to authenticate this note.     --FÉLIX Middleton - CNP on 11/4/2021 at 9:19 AM

## 2021-11-23 ENCOUNTER — OFFICE VISIT (OUTPATIENT)
Dept: ORTHOPEDIC SURGERY | Age: 66
End: 2021-11-23
Payer: COMMERCIAL

## 2021-11-23 VITALS — BODY MASS INDEX: 28.11 KG/M2 | HEIGHT: 74 IN | WEIGHT: 219 LBS

## 2021-11-23 DIAGNOSIS — M25.562 LEFT KNEE PAIN, UNSPECIFIED CHRONICITY: Primary | ICD-10-CM

## 2021-11-23 DIAGNOSIS — M17.12 PRIMARY OSTEOARTHRITIS OF LEFT KNEE: ICD-10-CM

## 2021-11-23 PROCEDURE — 99215 OFFICE O/P EST HI 40 MIN: CPT | Performed by: ORTHOPAEDIC SURGERY

## 2021-11-23 NOTE — LETTER
[facility]  Surgery Scheduling Form:    21     DEMOGRAPHICS    Patient Name:  Fadumo Ba  Patient :  1955   Patient SS#:  [id number]    Patient Phone:  [phone] (home) [phone] (work) Alt. Patient Phone:    Patient Address:  [place] [address]    PCP:  Yvon Rogers DO  Insurance:  Payor: Paulo Wiseman / Plan: [facility] [place] PPO / Product Type: *No Product type* /   Insurance ID Number:  Payor/Plan Subscr  Sex Relation Sub. Ins. ID Effective Group Num   1.  [address] Olguin Peed 1955 Male Self [id number] 1/1/15 [id number]                                   [address]       DIAGNOSIS & PROCEDURE    Diagnosis: LEFT  M17.12 Primary Osteoarthritis    Operation: LEFT  05067 Total Knee Arthroplasty - complex  Provider:  Yeny Vicente MD    Location:  [facility]      SCHEDULING INFORMATION     Requested Date: 2022  Requested Time:  To follow       Patient Arrival Time:  2 hours prior to scheduled time  OR Time Required:  90 Minutes  Admission:  []Outpatient   []23 hour  [x]Same Day Admit:   1-2  days  []Inpatient    Anesthesia:  []General  [x]Spinal  [x]MAC/Sedation  Regional Anesthesia:  []None  []Lumbar Plexus Block  []Fascia Iliaca  []Femoral  [x]Adductor canal  []Interscalene Block  []Insert Catheter  []OrthoMix []Exparel       EQUIPMENT    Position:  [x]Supine  []Lateral  []Beach-chair  []Prone    OR Bed:  [x]Regular  [x]DeMayo knee positioner  []Tani    Radiology:  []Large C-arm  []Small C-arm  []Portable X-ray    Implants:  Medacta Knee:  [x]Primary Set  []Revision Set  Vero Biomet Knee:  []Revision Set    SUTURE: []#5 Ethibond  [x]#2 Ethibond  [x]#2 Quill  []#1 PDS  [x]#1 Vicryl                   [x]2-0 Vicryl  [x]3-0 Monocryl  []2-0 Nylon  []3-0 Nylon  []3-0 PDS                    []Dermabond  []Steri-strips (in half)  DRESSING:  [x]Prineo dermabond  []4x4 gauze  [x]ABDs  [x]Tegaderm                         []Staples  []Pravena incisional vac  BRACE: []Pelvic Binder  []Hip X-ACT  []Knee TROM  [x]Knee immobilizer                 []Shoulder Immob. (w/abd. pillow)  []Sling  []Ice Unit  [x]Ace-Wrap      []Vero Biomet:  Jacob Aviles [phone], Dwight Tong. Lexie@"[employer]  [x]Medacta: Tabitha Kelly [phone], Savage@"[employer]. [employer]  []Fx Shoulder: Denis Adair [phone], Buster Estrada. Mekhi@"[employer]. com  [][place]: Katerine Tijerina [phone], Suma Andrew. [email or web address]    Comments:        Geovani Davalos MD  [place] [address] [facility]  11/23/2021       10:03 AM EST

## 2021-11-23 NOTE — PROGRESS NOTES
Left 10/28/14    debridement,rotator cuff repair    TONSILLECTOMY AND ADENOIDECTOMY Bilateral 1960     Social History     Tobacco Use    Smoking status: Never Smoker    Smokeless tobacco: Never Used   Substance Use Topics    Alcohol use: Yes     Alcohol/week: 0.0 standard drinks     Comment: 1-3 beers a week. Current Outpatient Medications on File Prior to Visit   Medication Sig Dispense Refill    Misc Natural Products (TART CHERRY ADVANCED) CAPS Take by mouth      famotidine (PEPCID) 20 MG tablet Take 1 tablet by mouth 2 times daily (Patient taking differently: Take 20 mg by mouth as needed ) 60 tablet 3    LUTEIN PO Take by mouth      Multiple Vitamins-Minerals (VITEYES AREDS FORMULA PO) Take by mouth      Omega-3 Fatty Acids (OMEGA 3 PO) Take by mouth daily       calcium carbonate (OSCAL) 500 MG TABS tablet Take 500 mg by mouth daily.  Glucosamine-Chondroitin (GLUCOSAMINE CHONDR COMPLEX PO) Take by mouth daily        No current facility-administered medications on file prior to visit. ASCVD 10-YEAR RISK SCORE  The 10-year ASCVD risk score (Patty Patel, et al., 2013) is: 10.8%    Values used to calculate the score:      Age: 77 years      Sex: Male      Is Non- : No      Diabetic: No      Tobacco smoker: No      Systolic Blood Pressure: 838 mmHg      Is BP treated: No      HDL Cholesterol: 56 mg/dL      Total Cholesterol: 195 mg/dL     Review of Systems  10-point ROS is negative other than HPI. Physical Exam  Based off 1997 Exam Criteria  Ht 6' 2\" (1.88 m)   Wt 219 lb (99.3 kg)   BMI 28.12 kg/m²      Constitutional:       General: He is not in acute distress. Appearance: Normal appearance. Cardiovascular:      Rate and Rhythm: Normal rate and regular rhythm. Pulses: Normal pulses. Pulmonary:      Effort: Pulmonary effort is normal. No respiratory distress. Neurological:      Mental Status: He is alert and oriented to person, place, and time. Mental status is at baseline. Musculoskeletal:  Gait:  antalgic    L Knee: Examination of the left knee reveals intact skin. Valgus deformity on standing exam.  Valgus thrust with ambulation. Tenderness over the lateral joint line. Negative Tinel's sign over the fibular head. 5/5 strength with dorsiflexion, plantarflexion inversion and eversion of the foot. Mild instability to valgus stress. His valgus is only partially correctable, I would say minimally to varus stress. Laxity present on exam of the MCL. Imaging  Left Knee: [facility] [place]  Radiographs: X-rays ordered today reviewed the left knee. 4 views. Standing AP, standing AP flexed, lateral, and skyline views. They demonstrate advanced lateral joint space thinning and osteophyte formation. Significant thinning of the patellofemoral joint with osteophyte formation. No evidence of fractures or dislocations. He also appears to have some widening of the medial joint space consistent with potential MCL failure. Procedure:  Orders Placed This Encounter   Procedures    XR KNEE LEFT (MIN 4 VIEWS)     Standing Status:   Future     Number of Occurrences:   1     Standing Expiration Date:   11/23/2022     Order Specific Question:   Reason for exam:     Answer:   PAIN    CT KNEE LEFT WO CONTRAST     Standing Status:   Future     Standing Expiration Date:   11/23/2022     Scheduling Instructions:      [address], [place]            Please call [phone] to schedule once approved by insurance     Order Specific Question:   Reason for exam:     Answer:   Colette Plummer was seen today for follow-up. Diagnoses and all orders for this visit:    Left knee pain, unspecified chronicity  -     XR KNEE LEFT (MIN 4 VIEWS); Future    Primary osteoarthritis of left knee  -     CT KNEE LEFT WO CONTRAST;  Future        Patient is suffering from advanced left knee osteoarthritis. He will need a left total knee arthroplasty. This will be a complex case. We have cautioned him that there is a chance of a peroneal nerve palsy. He expresses understanding. He is planning for surgery early next year. He will call the office to schedule case once he works out his timing issues. I discussed with Araidne Aquino that his history, symptoms, signs and imaging are most consistent with knee arthritis    We reviewed the natural history of these conditions and treatment options ranging from conservative measures (rest, icing, activity modification, physical therapy, pain meds, cortisone injection)  to surgical options. We had a long discussion with the patient about their knee. We discussed surgical and non surgical options for knee arthritis. The most important thing is to work to maintain their range of motion. Next they can try medications including tylenol and NSAIDs. They can try glucosamine or chondroitin. They should also ice frequently and avoid activities that make their knee hurt. Cortisone injections and Synvisc injections are also options when medicine has failed. We finally discussed surgical options including arthroscopic debridement versus knee replacement. Often the arthritis is too far gone for an arthroscopic debridement and pain relief will be short term. Their ultimate solution will be a knee replacement when they are ready for it. They should put it off until they can no longer stand the pain and when nothing else has worked. Conservative measures have failed. He is not interested in cortisone injections. I think he is an appropriate candidate for surgery due to his ongoing symptoms and dysfunction despite conservative measures. The procedure would be LEFT-COMPLEX  72566 Total Knee Arthroplasty    Additional imaging needed: CT scan. Perioperative considerations include: Pre-operative clearance from medical subspecialty.     We reviewed the risks, benefits, alternatives of this approach. We discussed risks including, but not limited to, bleeding, pain, infection, scarring, damage to the neurovascular structures, blood clots, pulmonary embolus, stiffness, implant instability or loosening, implant failure, incomplete relief of pain, and incomplete return of function. He is at significantly high risk for perioperative complications considering his advanced level of arthritis and advanced his valgus deformity. We will list this is complexes additional mechanical corrections and potential semiconstrained or even hinged prosthesis would be necessary on standby. His deformity of valgus is quite significant. We also reviewed the surgical details, expected recovery, and rehabilitation (6-9 months). He expressed understanding and will undergo preoperative medical evaluation and optimization. Electronically signed by Richelle Montana MD on 11/23/2021 at 10:19 AM  This dictation was generated by voice recognition computer software. Although all attempts are made to edit the dictation for accuracy, there may be errors in the transcription that are not intended.

## 2021-11-29 ENCOUNTER — TELEPHONE (OUTPATIENT)
Dept: ORTHOPEDIC SURGERY | Age: 66
End: 2021-11-29

## 2022-01-15 ENCOUNTER — HOSPITAL ENCOUNTER (OUTPATIENT)
Dept: CT IMAGING | Age: 67
Discharge: HOME | End: 2022-01-15
Payer: COMMERCIAL

## 2022-01-15 DIAGNOSIS — M17.12 PRIMARY OSTEOARTHRITIS OF LEFT KNEE: ICD-10-CM

## 2022-01-15 PROCEDURE — 73700 CT LOWER EXTREMITY W/O DYE: CPT

## 2022-02-22 DIAGNOSIS — M17.12 PRIMARY OSTEOARTHRITIS OF LEFT KNEE: Primary | ICD-10-CM

## 2022-02-22 DIAGNOSIS — Z01.818 PREOP TESTING: ICD-10-CM

## 2022-02-23 ENCOUNTER — TELEPHONE (OUTPATIENT)
Dept: ORTHOPEDIC SURGERY | Age: 67
End: 2022-02-23

## 2022-02-23 NOTE — TELEPHONE ENCOUNTER
"Chief Complaint   Patient presents with     Prenatal Care     37w0d       Initial /52 (BP Location: Right arm, Cuff Size: Adult Regular)   Pulse 74   Wt 81.4 kg (179 lb 8 oz)   LMP 2019   BMI 30.87 kg/m   Estimated body mass index is 30.87 kg/m  as calculated from the following:    Height as of 19: 1.624 m (5' 3.94\").    Weight as of this encounter: 81.4 kg (179 lb 8 oz).  BP completed using cuff size: regular        Dee Romeo MA on 3/23/2020 at 4:13 PM        " SUBMITTED SX PA L KNEE VIA [facility]. PENDING STATUS [id number].

## 2022-02-23 NOTE — PROGRESS NOTES
Denver Dixons    Age 77 y.o.    male    1955    MRN [id number]    3/28/2022  Arrival Time_____________  OR Time____________191 Min     Procedure(s):  LEFT TOTAL KNEE ARTHROPLASTY - COMPLEX WITH ADDUCTOR CANAL BLOCK                         MEDACTA                      Spinal     Surgeon(s): David Alejandro, MD      DAY ADMIT ___  SDS/OP ___  OUTPT IN BED ___         Phone [phone] (home) [phone] (work)   PCP _____________________ Phone_________________ [address] ( ) Epic CE ( ) Appt ________    ADDITIONAL INFO __________________________________ Cardio/Consult _____________    NOTES _____________________________________________________________________    ____________________________________________________________________________    PAT APPT DATE:________ TIME: ________  FAXED QAD: _______  (__) H&P w/ hospitalist  ____________________________________________________________________________    COVID TEST: Date/Location______________        NURSING HISTORY COMPLETE: _______  (__) CBC       (__) W/ DIFF ___________  (__)  ECHO    __________  (__) Hgb A1C    ___________  (__) CHEST X RAY   __________  (__) LIPID PROFILE  ___________  (__) EKG   __________  (__) PT/PTT   ___________  (__) PFT's   __________  (__) BMP   ___________  (__) CAROTIDS  __________  (__) CMP   ___________  (__) VEIN MAPPING  __________  (__) U/A   ___________  (__) HISTORY & PHYSICAL __________  (__) URINE C & S  ___________  (__) CARDIAC CLEARANCE __________  (__) U/A W/ FLEX  ___________  (__) PULM.  CLEARANCE __________  (__) SERUM PREGNANCY ___________  (__) Check Epic DOS orders __________  (__) TYPE & SCREEN ________ repeat ( ) (__)  __________________ __________  (__) ALBUMIN   ___________  (__)  __________________ __________  (__) TRANSFERRIN  ___________  (__)  __________________ __________  (__) LIVER PROFILE  ___________  (__)  __________________ __________  (__) CARBOXY HGB  ___________  (__) URINE PREG DOS __________  (__) NICOTINE & MET.  ___________  (__) BLOOD SUGAR DOS __________  (__) PREALBUMIN  ___________    (__) MRSA NASAL SWAB ___________  (__) BLOOD THINNERS __________  (__) ACE/ ARBS: _____________________    (__) BETABLOCKERS ___________________

## 2022-02-28 ENCOUNTER — TELEPHONE (OUTPATIENT)
Dept: FAMILY MEDICINE CLINIC | Age: 67
End: 2022-02-28

## 2022-02-28 NOTE — TELEPHONE ENCOUNTER
----- Message from Dhiraj Molina sent at 2/28/2022  4:42 PM EST -----  Subject: Message to Provider    QUESTIONS  Information for Provider? Patient is scheduled for a pre-op 3/09 & would   like to know if he should complete labs prior to appointment.  ---------------------------------------------------------------------------  --------------  [address]  What is the best way for the office to contact you? OK to respond with   electronic message via [employer] portal (only for patients who have   registered [employer] account)  Preferred Call Back Phone Number? [phone]  ---------------------------------------------------------------------------  --------------  SCRIPT ANSWERS  Relationship to Patient?  Self

## 2022-03-08 ENCOUNTER — TELEPHONE (OUTPATIENT)
Dept: ORTHOPEDIC SURGERY | Age: 67
End: 2022-03-08

## 2022-03-09 ENCOUNTER — OFFICE VISIT (OUTPATIENT)
Dept: FAMILY MEDICINE CLINIC | Age: 67
End: 2022-03-09
Payer: COMMERCIAL

## 2022-03-09 VITALS
HEIGHT: 74 IN | BODY MASS INDEX: 27.98 KG/M2 | OXYGEN SATURATION: 99 % | HEART RATE: 63 BPM | SYSTOLIC BLOOD PRESSURE: 132 MMHG | TEMPERATURE: 96.4 F | DIASTOLIC BLOOD PRESSURE: 80 MMHG | WEIGHT: 218 LBS

## 2022-03-09 DIAGNOSIS — Z01.818 PRE-OP EXAM: ICD-10-CM

## 2022-03-09 DIAGNOSIS — M17.12 PRIMARY OSTEOARTHRITIS OF LEFT KNEE: Primary | ICD-10-CM

## 2022-03-09 PROCEDURE — 99214 OFFICE O/P EST MOD 30 MIN: CPT | Performed by: FAMILY MEDICINE

## 2022-03-09 PROCEDURE — 93000 ELECTROCARDIOGRAM COMPLETE: CPT | Performed by: FAMILY MEDICINE

## 2022-03-09 ASSESSMENT — ENCOUNTER SYMPTOMS
BLOOD IN STOOL: 0
ABDOMINAL PAIN: 0
RESPIRATORY NEGATIVE: 1
TROUBLE SWALLOWING: 0
SORE THROAT: 0
CONSTIPATION: 0
DIARRHEA: 0

## 2022-03-09 NOTE — PROGRESS NOTES
Subjective:      Patient ID: Hung Mesa is a 77 y.o. y.o. male. Here for pre-op exam for left total knee replacement . March 28. Dr Ronal Graves    Has chronic pain left knee  Loose feeling and like going to give out. Has had a lot of non-surgical therapy without significant improvement in his symptoms. Symptoms have been present for years with gradual deterioration and progression of his symptoms and debility. Has in general been OK. Meds and history reviewed.     HPI      Chief Complaint   Patient presents with    Pre-op Exam     Left knee replacement / 3/28/22/ Dr. Gay Galicia Naprosyn [Naproxen] Rash       Past Medical History:   Diagnosis Date    Allergic rhinitis     Arthritis     Osteoarthritis        Past Surgical History:   Procedure Laterality Date    BONE GRAFT      rt wrist  age 27   Lenore Orts CARPAL TUNNEL RELEASE Right 03/14/2017    CARPAL TUNNEL RELEASE Left 04/11/2017    with excision volar wrist ganglion    COLONOSCOPY  2008    no polyps    COLONOSCOPY  02/26/2018    diverticulosis    HAND SURGERY Left 12/12/2017    Proximal row carpectomy    HERNIA REPAIR Bilateral 08/23/2018     ROBOTIC [place] JOINT REPLACEMENT  2008    rt total hip    KNEE ARTHROSCOPY Left 8/20/2014    LEFT KNEE ARTHROSCOPY, CHONDROPLASTY, SYNOVECTOMY, PARTIAL    MT REPAIR ING HERNIA,5+Y/O,REDUCIBL Bilateral 8/23/2018    ROBOTIC INGUINAL HERNIA REPAIR WITH MESH performed by Rico Sagastume MD at [place] [address] ARTHROSCOPY Left 10/28/14    debridement,rotator cuff repair    TONSILLECTOMY AND ADENOIDECTOMY Bilateral 1960       Social History     Socioeconomic History    Marital status: Single     Spouse name: Not on file    Number of children: Not on file    Years of education: Not on file    Highest education level: Not on file   Occupational History    Not on file   Tobacco Use    Smoking status: Never Smoker    Smokeless tobacco: Never Used   Vaping Use    Vaping Use: Never used   Substance and Sexual Activity    Alcohol use: Yes     Alcohol/week: 0.0 standard drinks     Comment: 1-3 beers a week.  Drug use: No    Sexual activity: Yes   Other Topics Concern    Not on file   Social History Narrative    Not on file     Social Determinants of Health     Financial Resource Strain: Low Risk     Difficulty of Paying Living Expenses: Not hard at all   Food Insecurity: No Food Insecurity    Worried About Running Out of Food in the Last Year: Never true    [place] of Food in the Last Year: Never true   Transportation Needs: No Transportation Needs    Lack of Transportation (Medical): No    Lack of Transportation (Non-Medical):  No   Physical Activity:     Days of Exercise per Week: Not on file    Minutes of Exercise per Session: Not on file   Stress:     Feeling of Stress : Not on file   Social Connections:     Frequency of Communication with Friends and Family: Not on file    Frequency of Social Gatherings with Friends and Family: Not on file    Attends Restorationist Services: Not on file    Active Member of [address] [place] [address] or Organizations: Not on file    Attends Club or Organization Meetings: Not on file    Marital Status: Not on file   Intimate Partner Violence:     Fear of Current or Ex-Partner: Not on file    Emotionally Abused: Not on file    Physically Abused: Not on file    Sexually Abused: Not on file   Housing Stability: Unknown    Unable to Pay for Housing in the Last Year: No    Number of [place] in the Last Year: Not on file    Unstable Housing in the Last Year: No       Family History   Problem Relation Age of Onset    Cancer Mother         skin cancer    High Blood Pressure Mother     Cancer Father         bladder    High Blood Pressure Father     No Known Problems Maternal Grandmother     No Known Problems Maternal Grandfather     Cancer Paternal Grandmother         leukemia    No Known Problems Paternal Grandfather     Diabetes Neg Hx     Heart Disease Neg Hx        Vitals:    03/09/22 0952   BP: 132/80   Pulse: 63   Temp: 96.4 °F (35.8 °C)   SpO2: 99%       Wt Readings from Last 3 Encounters:   03/09/22 218 lb (98.9 kg)   11/23/21 219 lb (99.3 kg)   11/04/21 219 lb (99.3 kg)       Review of Systems   Constitutional: Negative for unexpected weight change. Activities change somewhat by his knee symptoms. He is ambulatory but cannot do physical work or exercise as he used to. HENT: Negative. Negative for sore throat and trouble swallowing. Eyes: Negative for visual disturbance. Respiratory: Negative. Cardiovascular: Negative. Gastrointestinal: Negative for abdominal pain, blood in stool, constipation and diarrhea. Taking the Pepcid and doing well. Controls his acid sx       Endocrine: Negative for cold intolerance and heat intolerance. Genitourinary: Negative for dysuria, frequency and hematuria. Nocturia x 1   Musculoskeletal: Positive for arthralgias. Knee pain as in the HPI   Skin: Negative for rash. Neurological: Negative for facial asymmetry, speech difficulty and headaches. Hematological: Does not bruise/bleed easily. Psychiatric/Behavioral: Negative. Objective:   Physical Exam  Vitals reviewed. Constitutional:       Appearance: He is not ill-appearing. HENT:      Right Ear: Tympanic membrane normal.      Left Ear: Tympanic membrane normal.      Nose: Nose normal.      Mouth/Throat:      Mouth: Mucous membranes are moist.      Pharynx: Oropharynx is clear. Eyes:      General: No scleral icterus. Conjunctiva/sclera: Conjunctivae normal.   Neck:      Vascular: No carotid bruit. Cardiovascular:      Rate and Rhythm: Normal rate and regular rhythm. Pulses: Normal pulses. Heart sounds: Normal heart sounds. Pulmonary:      Effort: Pulmonary effort is normal.      Breath sounds: Normal breath sounds.    Abdominal: General: Abdomen is flat. Bowel sounds are normal. There is no distension. Palpations: Abdomen is soft. There is no mass. Tenderness: There is no abdominal tenderness. Musculoskeletal:      Right lower leg: No edema. Left lower leg: No edema. Comments: Left knee hypertrophic, crepitant,  Some laxity anteriorly   Lymphadenopathy:      Cervical: No cervical adenopathy. Skin:     General: Skin is warm and dry. Neurological:      General: No focal deficit present. Mental Status: He is alert and oriented to person, place, and time. Psychiatric:         Mood and Affect: Mood normal.         Behavior: Behavior normal.         Thought Content: Thought content normal.         Assessment:   DJD, left knee  Preop exam        Plan:   EKG OK-    Has pre-op lab orders    Needs done at the hospital.  Discussed the timing of the labs and his Covid test and logistics. General inst  Continue the same program  Medically stable, medically cleared for knee surgery. Current Outpatient Medications   Medication Sig Dispense Refill    mupirocin (BACTROBAN) 2 % ointment Apply twice daily to each nare for the  5 days prior to surgical procedure 1 g 0    Misc Natural Products (TART CHERRY ADVANCED) CAPS Take by mouth      famotidine (PEPCID) 20 MG tablet Take 1 tablet by mouth 2 times daily (Patient taking differently: Take 20 mg by mouth as needed ) 60 tablet 3    LUTEIN PO Take by mouth      Multiple Vitamins-Minerals (VITEYES AREDS FORMULA PO) Take by mouth      Omega-3 Fatty Acids (OMEGA 3 PO) Take by mouth daily       calcium carbonate (OSCAL) 500 MG TABS tablet Take 500 mg by mouth daily.  Glucosamine-Chondroitin (GLUCOSAMINE CHONDR COMPLEX PO) Take by mouth daily        No current facility-administered medications for this visit.

## 2022-03-09 NOTE — Clinical Note
Patient seen and preop history and physical performed. Medically stable and okay for surgery. He will be getting his labs in the next week or so.   Full H&P is in [facility]

## 2022-03-11 ENCOUNTER — HOSPITAL ENCOUNTER (OUTPATIENT)
Age: 67
Discharge: HOME | End: 2022-03-15
Payer: COMMERCIAL

## 2022-03-11 ENCOUNTER — HOSPITAL ENCOUNTER (OUTPATIENT)
Dept: PHYSICAL THERAPY | Age: 67
Setting detail: THERAPIES SERIES
Discharge: HOME | End: 2022-03-11
Payer: COMMERCIAL

## 2022-03-11 LAB
ABO/RH: NORMAL
ALBUMIN SERPL-MCNC: 4.4 G/DL (ref 3.4–5)
ANION GAP SERPL CALCULATED.3IONS-SCNC: 13 MMOL/L (ref 3–16)
ANTIBODY SCREEN: NORMAL
APTT: 39.2 SEC (ref 26.2–38.6)
BASOPHILS ABSOLUTE: 0 K/UL (ref 0–0.2)
BASOPHILS RELATIVE PERCENT: 0.6 %
BILIRUBIN URINE: NEGATIVE
BLOOD, URINE: NEGATIVE
BUN BLDV-MCNC: 18 MG/DL (ref 7–20)
CALCIUM SERPL-MCNC: 9.7 MG/DL (ref 8.3–10.6)
CHLORIDE BLD-SCNC: 102 MMOL/L (ref 99–110)
CLARITY: CLEAR
CO2: 25 MMOL/L (ref 21–32)
COLOR: YELLOW
CREAT SERPL-MCNC: 1 MG/DL (ref 0.8–1.3)
EOSINOPHILS ABSOLUTE: 0.2 K/UL (ref 0–0.6)
EOSINOPHILS RELATIVE PERCENT: 2.5 %
ESTIMATED AVERAGE GLUCOSE: 102.5 MG/DL
GFR AFRICAN AMERICAN: >60
GFR NON-AFRICAN AMERICAN: >60
GLUCOSE BLD-MCNC: 105 MG/DL (ref 70–99)
GLUCOSE URINE: NEGATIVE MG/DL
HBA1C MFR BLD: 5.2 %
HCT VFR BLD CALC: 46.3 % (ref 40.5–52.5)
HEMOGLOBIN: 15.9 G/DL (ref 13.5–17.5)
INR BLD: 1.01 (ref 0.88–1.12)
KETONES, URINE: NEGATIVE MG/DL
LEUKOCYTE ESTERASE, URINE: NEGATIVE
LYMPHOCYTES ABSOLUTE: 1.4 K/UL (ref 1–5.1)
LYMPHOCYTES RELATIVE PERCENT: 23 %
MCH RBC QN AUTO: 31.2 PG (ref 26–34)
MCHC RBC AUTO-ENTMCNC: 34.3 G/DL (ref 31–36)
MCV RBC AUTO: 91.1 FL (ref 80–100)
MICROSCOPIC EXAMINATION: NORMAL
MONOCYTES ABSOLUTE: 0.5 K/UL (ref 0–1.3)
MONOCYTES RELATIVE PERCENT: 8.7 %
NEUTROPHILS ABSOLUTE: 4.1 K/UL (ref 1.7–7.7)
NEUTROPHILS RELATIVE PERCENT: 65.2 %
NITRITE, URINE: NEGATIVE
PDW BLD-RTO: 12.7 % (ref 12.4–15.4)
PH UA: 6 (ref 5–8)
PLATELET # BLD: 322 K/UL (ref 135–450)
PMV BLD AUTO: 7.6 FL (ref 5–10.5)
POTASSIUM SERPL-SCNC: 4.8 MMOL/L (ref 3.5–5.1)
PROTEIN UA: NEGATIVE MG/DL
PROTHROMBIN TIME: 11.4 SEC (ref 9.9–12.7)
RBC # BLD: 5.08 M/UL (ref 4.2–5.9)
SODIUM BLD-SCNC: 140 MMOL/L (ref 136–145)
SPECIFIC GRAVITY UA: 1.02 (ref 1–1.03)
TRANSFERRIN: 273 MG/DL (ref 200–360)
URINE TYPE: NORMAL
UROBILINOGEN, URINE: 0.2 E.U./DL
WBC # BLD: 6.3 K/UL (ref 4–11)

## 2022-03-11 PROCEDURE — 86900 BLOOD TYPING SEROLOGIC ABO: CPT

## 2022-03-11 PROCEDURE — 81003 URINALYSIS AUTO W/O SCOPE: CPT

## 2022-03-11 PROCEDURE — 97110 THERAPEUTIC EXERCISES: CPT

## 2022-03-11 PROCEDURE — 87641 MR-STAPH DNA AMP PROBE: CPT

## 2022-03-11 PROCEDURE — 86850 RBC ANTIBODY SCREEN: CPT

## 2022-03-11 PROCEDURE — 83036 HEMOGLOBIN GLYCOSYLATED A1C: CPT

## 2022-03-11 PROCEDURE — 87086 URINE CULTURE/COLONY COUNT: CPT

## 2022-03-11 PROCEDURE — 97161 PT EVAL LOW COMPLEX 20 MIN: CPT

## 2022-03-11 PROCEDURE — 85730 THROMBOPLASTIN TIME PARTIAL: CPT

## 2022-03-11 PROCEDURE — 36415 COLL VENOUS BLD VENIPUNCTURE: CPT

## 2022-03-11 PROCEDURE — 84466 ASSAY OF TRANSFERRIN: CPT

## 2022-03-11 PROCEDURE — 82040 ASSAY OF SERUM ALBUMIN: CPT

## 2022-03-11 PROCEDURE — 80048 BASIC METABOLIC PNL TOTAL CA: CPT

## 2022-03-11 PROCEDURE — 85025 COMPLETE CBC W/AUTO DIFF WBC: CPT

## 2022-03-11 PROCEDURE — 85610 PROTHROMBIN TIME: CPT

## 2022-03-11 PROCEDURE — 86901 BLOOD TYPING SEROLOGIC RH(D): CPT

## 2022-03-11 NOTE — PLAN OF CARE
[address] [place], [address] [facility] [address]    Physical Therapy Certification    Dear Referring Practitioner: Dr. Veronica Briggs,    We had the pleasure of evaluating the following patient for physical therapy services at [address] [place] [address]. A summary of our findings can be found in the initial assessment below. This includes our plan of care. If you have any questions or concerns regarding these findings, please do not hesitate to contact me at the office phone number checked above. Thank you for the referral.       Physician Signature:_______________________________Date:__________________  By signing above (or electronic signature), therapists plan is approved by physician    Patient: Joya Barillas   : 1955   MRN: [id number]  Referring Physician: Referring Practitioner: Dr. Veronica Briggs      Evaluation Date: 3/11/2022      Medical Diagnosis Information:  Diagnosis: L knee OA M17. 12   Treatment Diagnosis: Left knee pain M25. 562                                         Insurance information: PT Insurance Information: [place]     Precautions/ Contra-indications: OA     C-SSRS Triggered by Intake questionnaire (Past 2 wk assessment):   [x] No, Questionnaire did not trigger screening.   [] Yes, Patient intake triggered further evaluation      [] C-SSRS Screening completed  [] PCP notified via Plan of Care  [] Emergency services notified     Latex Allergy:  [x]NO      []YES  Preferred Language for Healthcare:   [x]English       []other:    SUBJECTIVE: Patient stated complaint: L knee pain for years. History of L knee scope and R ANNA. Scheduled for L TKA, here to prepare for sx.     Relevant Medical History: OA, hx R anna  Functional Disability Index: LEFS 30%    Pain Scale: 2/10  Easing factors: rest  Provocative factors: activity     Type: [x]Constant   [x]Intermittent []Radiating []Localized []other:     Numbness/Tingling: some at times    Occupation/School:     Living Status/Prior Level of Function: Independent with ADLs and IADLs. [x]Dermatomes/Myotomes intact    [x]Reflexes equal and normal bilaterally   []Other:    Joint mobility:     []Normal    []Hypo   []Hyper    Palpation: denies    Functional Mobility/Transfers: I with transfers    Posture: WFL    Bandages/Dressings/Incisions: NA    Gait: (include devices/WB status) lacks full knee ext LLE during stance phase    Orthopedic Special Tests: NA                       [x] Patient history, allergies, meds reviewed. Medical chart reviewed. See intake form. Review Of Systems (ROS):  [x]Performed Review of systems (Integumentary, CardioPulmonary, Neurological) by intake and observation. Intake form has been scanned into medical record. Patient has been instructed to contact their primary care physician regarding ROS issues if not already being addressed at this time.       Co-morbidities/Complexities (which will affect course of rehabilitation):    []None           Arthritic conditions   []Rheumatoid arthritis (M05.9)  [x]Osteoarthritis (M19.91)   Cardiovascular conditions   []Hypertension (I10)  []Hyperlipidemia (E78.5)  []Angina pectoris (I20)  []Atherosclerosis (I70)   Musculoskeletal conditions   []Disc pathology   []Congenital spine pathologies   []Prior surgical intervention  []Osteoporosis (M81.8)  []Osteopenia (M85.8)   Endocrine conditions   []Hypothyroid (E03.9)  []Hyperthyroid Gastrointestinal conditions   []Constipation (O33.02)   Metabolic conditions   []Morbid obesity (E66.01)  []Diabetes type 1(E10.65) or 2 (E11.65)   []Neuropathy (G60.9)     Pulmonary conditions   []Asthma (J45)  []Coughing   []COPD (J44.9)   Psychological Disorders  []Anxiety (F41.9)  []Depression (F32.9)   []Other:   []Other:          Barriers to/and or personal factors that will affect rehab potential: [x]Age  []Sex              []Motivation/Lack of Motivation                        [x]Co-Morbidities              []Cognitive Function, education/learning barriers              []Environmental, home barriers              []profession/work barriers  []past PT/medical experience  []other:  Justification:      Falls Risk Assessment (30 days):    [x] Falls Risk assessed and no intervention required. [] Falls Risk assessed and Patient requires intervention due to being higher risk   TUG score (>12s at risk):     [] Falls education provided, including       G-Codes:       ASSESSMENT:    Functional Impairments:     []Noted lumbar/proximal hip/LE joint hypomobility   [x]Decreased LE functional ROM   [x]Decreased core/proximal hip strength and neuromuscular control   [x]Decreased LE functional strength   [x]Reduced balance/proprioceptive control   []other:      Functional Activity Limitations (from functional questionnaire and intake)   [x]Reduced ability to tolerate prolonged functional positions   [x]Reduced ability or difficulty with changes of positions or transfers between positions   [x]Reduced ability to maintain good posture and demonstrate good body mechanics with sitting, bending, and lifting   [x]Reduced ability to sleep   [x] Reduced ability or tolerance with driving and/or computer work   [x]Reduced ability to perform lifting, carrying tasks   [x]Reduced ability to squat   [x]Reduced ability to forward bend   [x]Reduced ability to ambulate prolonged functional periods/distances/surfaces   [x]Reduced ability to ascend/descend stairs   [x]Reduced ability to run, hop, cut or jump   []other:    Participation Restrictions   []Reduced participation in self care activities   [x]Reduced participation in home management activities   []Reduced participation in work activities   []Reduced participation in social activities. [x]Reduced participation in sport/recreation activities.     Classification :    []Signs/symptoms consistent with post-surgical status including decreased ROM, strength and function. []Signs/symptoms consistent with joint sprain/strain   []Signs/symptoms consistent with patella-femoral syndrome   [x]Signs/symptoms consistent with knee OA/hip OA   []Signs/symptoms consistent with internal derangement of knee/Hip   []Signs/symptoms consistent with functional hip weakness/NMR control      []Signs/symptoms consistent with tendinitis/tendinosis    []signs/symptoms consistent with pathology which may benefit from Dry needling      []other:      Prognosis/Rehab Potential:      []Excellent   [x]Good    []Fair   []Poor    Tolerance of evaluation/treatment:    []Excellent   [x]Good    []Fair   []Poor    PLAN:   Frequency/Duration:  1 visit prehab : Interventions:  [x]  Therapeutic exercise including: strength training, ROM, for Lower extremity and core   []  NMR activation and proprioception for LE, Glutes and Core   []  Manual therapy as indicated for LE, Hip and spine to include: Dry Needling/IASTM, STM, PROM, Gr I-IV mobilizations, manipulation. [] Modalities as needed that may include: thermal agents, E-stim, Biofeedback, US, iontophoresis as indicated  [] Patient education on joint protection, postural re-education, activity modification, progression of HEP. [x] Patient appears to be functionally prepared for surgery and will continue with a home exercise program until surgery date. [] Patient will be ready for surgery with a short period of structured physical therapy  [] Patient does not appear to be functionally ready for surgery and requires a prolonged  structure program    At this point, it appears patient's discharge status from hospital should be:   [x] Home with home exercise program  [] Home with home health care  [] Skilled nursing facility    HEP instruction: (see scanned forms)    GOALS:  Patient stated goal: Get stronger before surgery.     Therapist goals for Patient:   Short Term Goals: To be achieved in: 2 weeks  1. Independent in HEP and progression per patient tolerance, in order to prevent re-injury. 2. Patient will have a decrease in pain to facilitate improvement in movement, function, and ADLs as indicated by Functional Deficits.         Electronically signed by:  Dyana Butler PT

## 2022-03-11 NOTE — DISCHARGE SUMMARY
[address]  [facility], [address] [facility] [address]    Physical Therapy Daily Treatment Note  Date:  3/11/2022    Patient Name:  Judith Culver    :  1955  MRN: [id number]  Restrictions/Precautions:    Medical/Treatment Diagnosis Information:  · Diagnosis: L knee OA M17. 12  · Treatment Diagnosis: Left knee pain M25. 444  Insurance/Certification information:  PT Insurance Information: [place]  Physician Information:  Referring Practitioner: Dr. Courtney Camarena of care signed (Y/N):     Date of Patient follow up with Physician:     G-Code (if applicable):   LEFS 99%   Date G-Code Applied:         Progress Note: [x]  Yes  []  No  Next due by: Visit #10       Latex Allergy:  [x]NO      []YES  Preferred Language for Healthcare:   [x]English       []other:    Visit # Insurance Allowable   1 30     Pain level: 2/10     SUBJECTIVE:  See eval    OBJECTIVE: See eval  Observation:   Test measurements:      RESTRICTIONS/PRECAUTIONS: OA    Exercises/Interventions:     Therapeutic Ex Sets/reps Notes   Ankle pumps x30 HEP   Seated HS stretch 3x30 sec HEP   Seated calf stretch 3x30 sec HEP   Seated QS BLEs 10 sec 10x HEP   SLR FLX supine 1x10 HEP   Seated heelslide with strap 10 sec 10x HEP   LAQ  1x10 HEP   minisquats 1x10 HEP                                      [id number]     Manual Intervention                                   NMR re-education                                                      Therapeutic Exercise and NMR EXR  [x] (11135) Provided verbal/tactile cueing for activities related to strengthening, flexibility, endurance, ROM for improvements in LE, proximal hip, and core control with self care, mobility, lifting, ambulation.  [] (47020) Provided verbal/tactile cueing for activities related to improving balance, coordination, kinesthetic sense, posture, motor skill, proprioception  to assist with LE, proximal hip, and core control in self care, mobility, lifting, ambulation and eccentric single leg control. NMR and Therapeutic Activities:    [] (77152 or 58113) Provided verbal/tactile cueing for activities related to improving balance, coordination, kinesthetic sense, posture, motor skill, proprioception and motor activation to allow for proper function of core, proximal hip and LE with self care and ADLs  [] (86896) Gait Re-education- Provided training and instruction to the patient for proper LE, core and proximal hip recruitment and positioning and eccentric body weight control with ambulation re-education including up and down stairs     Home Exercise Program:    [x] (42518) Reviewed/Progressed HEP activities related to strengthening, flexibility, endurance, ROM of core, proximal hip and LE for functional self-care, mobility, lifting and ambulation/stair navigation   [] (57562)Reviewed/Progressed HEP activities related to improving balance, coordination, kinesthetic sense, posture, motor skill, proprioception of core, proximal hip and LE for self care, mobility, lifting, and ambulation/stair navigation      Manual Treatments:  PROM / STM / Oscillations-Mobs:  G-I, II, III, IV (PA's, Inf., Post.)  [] (54397) Provided manual therapy to mobilize LE, proximal hip and/or LS spine soft tissue/joints for the purpose of modulating pain, promoting relaxation,  increasing ROM, reducing/eliminating soft tissue swelling/inflammation/restriction, improving soft tissue extensibility and allowing for proper ROM for normal function with self care, mobility, lifting and ambulation.      Modalities:      Charges:  Timed Code Treatment Minutes: 25   Total Treatment Minutes: 40     [x] EVAL (LOW) 00093 (typically 20 minutes face-to-face)  [] EVAL (MOD) 93702 (typically 30 minutes face-to-face)  [] EVAL (HIGH) 31922 (typically 45 minutes face-to-face)  [] RE-EVAL     [x] VW(86743) x  2   [] IONTO  [] NMR (26059) x      [] VASO  [] Manual (62990) x       [] Other:  [] TA x       [] [facility] Traction (16104)  [] ES(attended) (91450)      [] ES (un) (64780):     GOALS:   Patient stated goal: To be prepared for surgery      Therapist goals for Patient:    Short Term Goals: To be achieved in: 2 weeks  1. Independent in HEP and progression per patient tolerance, in order to prevent re-injury and to prepare for surgery by strength and flexibility therex . Progression Towards Functional goals:  [] Patient is progressing as expected towards functional goals listed. [] Progression is slowed due to complexities listed. [] Progression has been slowed due to co-morbidities.   [x] Plan just implemented, too soon to assess goals progression  [] Other:     ASSESSMENT:  See eval    Treatment/Activity Tolerance:  [x] Patient tolerated treatment well [] Patient limited by fatique  [] Patient limited by pain  [] Patient limited by other medical complications  [] Other:     Prognosis: [x] Good [] Fair  [] Poor          Patient Requires Follow-up: [] Yes  [x] No    PLAN: See eval       [] Continue per plan of care [] Alter current plan (see comments)  [] Plan of care initiated [x] Discharge     Electronically signed by: Pb Hutton, PT, DPT

## 2022-03-12 LAB
MRSA SCREEN RT-PCR: NORMAL
URINE CULTURE, ROUTINE: NORMAL

## 2022-03-15 ENCOUNTER — TELEPHONE (OUTPATIENT)
Dept: ORTHOPEDIC SURGERY | Age: 67
End: 2022-03-15

## 2022-03-15 NOTE — TELEPHONE ENCOUNTER
COVID:NA  ORTHOPAEDIC NURSE NAVIGATOR SUMMARY NOTE      Anticipated Date of Surgery: 3/28/22    Recieved Pre-Op Education: yes   In person class:No  Pt used educational link:Yes   Pt completed pre and post test:Yes   If pt did not complete either, why not? Convience      PCP: Tory Delarosa DO   Phone #: [phone]    Date of PCP Visit for H&P: 3/9/22    Any Noted Concerns from PCP prior to surgery:  No   If Yes, what concerns?:    IS THE PATIENT IN A PAIN MANAGEMENT PROGRAM?:   No     Review of Past Medical History Reveals History of:      Critical Lab Values:   Hgb/Hct:   Hemoglobin (g/dL)   Date Value   03/11/2022 15.9   /  Hematocrit (%)   Date Value   03/11/2022 46.3      HgbA1C:    Lab Results   Component Value Date    LABA1C 5.2 03/11/2022    LABA1C 4.8 07/20/2018      Albumin:    Lab Results   Component Value Date    LABALBU 4.4 03/11/2022      BUN/Cr:   BUN (mg/dL)   Date Value   03/11/2022 18   /  CREATININE (mg/dL)   Date Value   03/11/2022 1.0      BMI:    BMI Readings from Last 1 Encounters:   03/09/22 27.99 kg/m²        Coronary Artery Disease/HTN/CHF History: No      Cardiologist:     Cardiac Clearance Necessary: No    Date of Cardiac Clearance Appt: On Plavix? No,  If YES, when will they stop taking? Final Cardiac Recommendations:N/A   -On any anticoagulation-NONE       Diabetes History: No    Most Recent HgbA1C:     PCP or Endocrine Recommendations: N/A    Nutritionist/Dietician Consult Scheduled: N/A    Final Plan For Diabetic Control: N/A   Pulmonary: COPD/Emphysema/ Use of home oxygen: NONE     Alcohol use:none        DVT Risk Stratification:  Low      Vascular Consult Ordered:  NA    Date of Vascular Appt:     Hematology/Oncology Consult Ordered:  NA    Date of Hematology/Oncology Appt:     Final Recommendation For DVT Prophylaxis:   -Smoking history or use of estrogen-none         BMI Greater than 40 at time of scheduling?: No    Has Surgeon been notified of BMI concern?  Not Applicable    Weight Loss Clinic Consult Ordered: Not Applicable    Date of Wt Loss Clinic Appt:     BMI at time of surgery (if went through [facility]): Additional Medical Concerns:         Discharge Disposition Information:     Attended Pre-Hab Program: yes     Anticipated Discharge Disposition:  Home with OP PT    Who will be with patient at home following discharge?   Niece       Equipment pt already has:  Walkers, toilet riser, shower chair   Bedroom on first or second floor: First   Bathroom on first or second floor: First   Weight bearing status: Full   Pre-op ambulatory status: NONE   Number of entry steps: 5 steps    Caregiver assistance: Full time    Pt plan to DC same day: Yes   Jeri Albarran preference: OP PT- [facility] on Thursday      [facility], RN  3/22/2022

## 2022-03-17 ENCOUNTER — TELEPHONE (OUTPATIENT)
Dept: ORTHOPEDIC SURGERY | Age: 67
End: 2022-03-17

## 2022-03-21 ENCOUNTER — ANESTHESIA EVENT (OUTPATIENT)
Dept: OPERATING ROOM | Age: 67
DRG: 470 | End: 2022-03-21
Payer: COMMERCIAL

## 2022-03-22 ENCOUNTER — HOSPITAL ENCOUNTER (OUTPATIENT)
Age: 67
Discharge: HOME | End: 2022-03-22
Payer: COMMERCIAL

## 2022-03-22 PROCEDURE — U0005 INFEC AGEN DETEC AMPLI PROBE: HCPCS

## 2022-03-22 PROCEDURE — U0003 INFECTIOUS AGENT DETECTION BY NUCLEIC ACID (DNA OR RNA); SEVERE ACUTE RESPIRATORY SYNDROME CORONAVIRUS 2 (SARS-COV-2) (CORONAVIRUS DISEASE [COVID-19]), AMPLIFIED PROBE TECHNIQUE, MAKING USE OF HIGH THROUGHPUT TECHNOLOGIES AS DESCRIBED BY CMS-2020-01-R: HCPCS

## 2022-03-22 NOTE — PROGRESS NOTES
1. Do not eat or drink anything after 12 midnight prior to surgery. This includes no water, chewing gum mints, or ice chips. You may brush your teeth and gargle the day of surgery but DO NOT SWALLOW THE WATER. 2. Please see your family doctor/pediatrician for a history and physical and/or concerning medications. Bring any test results/reports from your physician's office. If you are under the care of a heart doctor or specialist please be aware that you may be asked to see him or her for clearance. 3. You may be asked to stop blood thinners such as Coumadin, Plavix, Fragmin, and Lovenox or Anti-inflammatories such as Aspirin, Ibuprofen, Advil, and Naproxen prior to your surgery. Please check with your doctor before stopping these or any other medications. 4. Do not smoke, and do not drink any alcoholic beverages 24 hours prior to surgery. 5. You MUST make arrangements for a responsible adult to take you home after your surgery. For your safety, you will not be allowed to leave alone or drive yourself home. Your surgery will be cancelled if you do not have a ride home. Also for your safety, it is strongly suggested someone stay with you the first 24 hrs after your surgery. 6. A parent/legal guardian must accompany a child scheduled for surgery and plan to stay at the hospital until the child is discharged. Please do not bring other children with you. 7. For your comfort,please wear simple, loose fitting clothing to the hospital.  Please do not bring valuables (money, credit cards, checkbooks, etc.) Do not wear any makeup (including no eye makeup) or nail polish on your fingers or toes. 8. For your safety, please DO NOT wear any jewelry or piercings on day of surgery. All body piercing jewelry must be removed. 9. If you have dentures, they will be removed before going to the OR; for your convenience we will provide you with a container.   If you wear contact lenses or glasses, they will be removed, they will be removed, please bring a case for them. 10. If appicable,Please see your family doctor/pediatrician for a history & physical and/or concerning medications. Bring any test results/reports from your physician's office. 11. Remember to bring Blood Bank bracelet to the hospital on the day of surgery. 12. If you have a Living Will and Durable Power of  for Healthcare, please bring in a copy. 15. Notify your Surgeon if you develop any illness between now and surgery  time, cough, cold, fever, sore throat, nausea, vomiting, etc.  Please notify your surgeon if you experience dizziness, shortness of breath or blurred vision between now & the time of your surgery   14. DO NOT shave your operative site 96 hours prior to surgery. For face & neck surgery, men may use an electric razor 48 hours prior to surgery. 15. Shower the night before surgery with ___Antibacterial soap _X__Hibiclens   16. To provide excellent care visitors will be limited to one in the room at any given time. 17.  Please bring picture ID and insurance card. 18.  Visit our web site for additional information:  [employer]/surgery.

## 2022-03-23 LAB — SARS-COV-2: NOT DETECTED

## 2022-03-25 ENCOUNTER — TELEPHONE (OUTPATIENT)
Dept: ORTHOPEDIC SURGERY | Age: 67
End: 2022-03-25

## 2022-03-28 ENCOUNTER — APPOINTMENT (OUTPATIENT)
Dept: GENERAL RADIOLOGY | Age: 67
DRG: 470 | End: 2022-03-28
Attending: ORTHOPAEDIC SURGERY
Payer: COMMERCIAL

## 2022-03-28 ENCOUNTER — ANESTHESIA (OUTPATIENT)
Dept: OPERATING ROOM | Age: 67
DRG: 470 | End: 2022-03-28
Payer: COMMERCIAL

## 2022-03-28 ENCOUNTER — HOSPITAL ENCOUNTER (INPATIENT)
Age: 67
LOS: 1 days | Discharge: HOME | DRG: 470 | End: 2022-03-28
Attending: ORTHOPAEDIC SURGERY | Admitting: ORTHOPAEDIC SURGERY
Payer: COMMERCIAL

## 2022-03-28 VITALS
DIASTOLIC BLOOD PRESSURE: 79 MMHG | OXYGEN SATURATION: 100 % | RESPIRATION RATE: 1 BRPM | SYSTOLIC BLOOD PRESSURE: 138 MMHG | TEMPERATURE: 96.8 F

## 2022-03-28 VITALS
HEIGHT: 74 IN | RESPIRATION RATE: 14 BRPM | OXYGEN SATURATION: 100 % | SYSTOLIC BLOOD PRESSURE: 125 MMHG | TEMPERATURE: 96.9 F | BODY MASS INDEX: 27.59 KG/M2 | DIASTOLIC BLOOD PRESSURE: 67 MMHG | WEIGHT: 215 LBS | HEART RATE: 45 BPM

## 2022-03-28 DIAGNOSIS — Z96.652 S/P TKR (TOTAL KNEE REPLACEMENT), LEFT: Primary | ICD-10-CM

## 2022-03-28 DIAGNOSIS — M17.12 LOCALIZED OSTEOARTHRITIS OF LEFT KNEE: Primary | ICD-10-CM

## 2022-03-28 LAB
ABO/RH: NORMAL
ANTIBODY SCREEN: NORMAL

## 2022-03-28 PROCEDURE — 76942 ECHO GUIDE FOR BIOPSY: CPT | Performed by: ANESTHESIOLOGY

## 2022-03-28 PROCEDURE — 7100000011 HC PHASE II RECOVERY - ADDTL 15 MIN: Performed by: ORTHOPAEDIC SURGERY

## 2022-03-28 PROCEDURE — 99024 POSTOP FOLLOW-UP VISIT: CPT | Performed by: PHYSICIAN ASSISTANT

## 2022-03-28 PROCEDURE — C1776 JOINT DEVICE (IMPLANTABLE): HCPCS | Performed by: ORTHOPAEDIC SURGERY

## 2022-03-28 PROCEDURE — 97161 PT EVAL LOW COMPLEX 20 MIN: CPT

## 2022-03-28 PROCEDURE — 6360000002 HC RX W HCPCS: Performed by: NURSE ANESTHETIST, CERTIFIED REGISTERED

## 2022-03-28 PROCEDURE — 6360000002 HC RX W HCPCS: Performed by: ANESTHESIOLOGY

## 2022-03-28 PROCEDURE — 2500000003 HC RX 250 WO HCPCS: Performed by: ORTHOPAEDIC SURGERY

## 2022-03-28 PROCEDURE — 27447 TOTAL KNEE ARTHROPLASTY: CPT | Performed by: ORTHOPAEDIC SURGERY

## 2022-03-28 PROCEDURE — 86850 RBC ANTIBODY SCREEN: CPT

## 2022-03-28 PROCEDURE — 3600000015 HC SURGERY LEVEL 5 ADDTL 15MIN: Performed by: ORTHOPAEDIC SURGERY

## 2022-03-28 PROCEDURE — 7100000001 HC PACU RECOVERY - ADDTL 15 MIN: Performed by: ORTHOPAEDIC SURGERY

## 2022-03-28 PROCEDURE — 3E0T3BZ INTRODUCTION OF ANESTHETIC AGENT INTO PERIPHERAL NERVES AND PLEXI, PERCUTANEOUS APPROACH: ICD-10-PCS | Performed by: ORTHOPAEDIC SURGERY

## 2022-03-28 PROCEDURE — C2626 INFUSION PUMP, NON-PROG,TEMP: HCPCS | Performed by: ORTHOPAEDIC SURGERY

## 2022-03-28 PROCEDURE — 97110 THERAPEUTIC EXERCISES: CPT

## 2022-03-28 PROCEDURE — A4217 STERILE WATER/SALINE, 500 ML: HCPCS | Performed by: ORTHOPAEDIC SURGERY

## 2022-03-28 PROCEDURE — 6360000002 HC RX W HCPCS: Performed by: PHYSICIAN ASSISTANT

## 2022-03-28 PROCEDURE — 3700000001 HC ADD 15 MINUTES (ANESTHESIA): Performed by: ORTHOPAEDIC SURGERY

## 2022-03-28 PROCEDURE — 0SRD0J9 REPLACEMENT OF LEFT KNEE JOINT WITH SYNTHETIC SUBSTITUTE, CEMENTED, OPEN APPROACH: ICD-10-PCS | Performed by: ORTHOPAEDIC SURGERY

## 2022-03-28 PROCEDURE — 2500000003 HC RX 250 WO HCPCS: Performed by: ANESTHESIOLOGY

## 2022-03-28 PROCEDURE — 86900 BLOOD TYPING SEROLOGIC ABO: CPT

## 2022-03-28 PROCEDURE — 2580000003 HC RX 258: Performed by: ANESTHESIOLOGY

## 2022-03-28 PROCEDURE — 2500000003 HC RX 250 WO HCPCS: Performed by: NURSE ANESTHETIST, CERTIFIED REGISTERED

## 2022-03-28 PROCEDURE — 2720000010 HC SURG SUPPLY STERILE: Performed by: ORTHOPAEDIC SURGERY

## 2022-03-28 PROCEDURE — 7100000000 HC PACU RECOVERY - FIRST 15 MIN: Performed by: ORTHOPAEDIC SURGERY

## 2022-03-28 PROCEDURE — 3700000000 HC ANESTHESIA ATTENDED CARE: Performed by: ORTHOPAEDIC SURGERY

## 2022-03-28 PROCEDURE — 6360000002 HC RX W HCPCS: Performed by: ORTHOPAEDIC SURGERY

## 2022-03-28 PROCEDURE — 1200000000 HC SEMI PRIVATE

## 2022-03-28 PROCEDURE — 86901 BLOOD TYPING SEROLOGIC RH(D): CPT

## 2022-03-28 PROCEDURE — 73560 X-RAY EXAM OF KNEE 1 OR 2: CPT

## 2022-03-28 PROCEDURE — 2580000003 HC RX 258: Performed by: ORTHOPAEDIC SURGERY

## 2022-03-28 PROCEDURE — 3600000005 HC SURGERY LEVEL 5 BASE: Performed by: ORTHOPAEDIC SURGERY

## 2022-03-28 PROCEDURE — 97116 GAIT TRAINING THERAPY: CPT

## 2022-03-28 PROCEDURE — 2709999900 HC NON-CHARGEABLE SUPPLY: Performed by: ORTHOPAEDIC SURGERY

## 2022-03-28 PROCEDURE — C1713 ANCHOR/SCREW BN/BN,TIS/BN: HCPCS | Performed by: ORTHOPAEDIC SURGERY

## 2022-03-28 PROCEDURE — 7100000010 HC PHASE II RECOVERY - FIRST 15 MIN: Performed by: ORTHOPAEDIC SURGERY

## 2022-03-28 DEVICE — TIBIAL INSERT FIXED SPHERE FLEX #5/10 MM L
Type: IMPLANTABLE DEVICE | Site: KNEE | Status: FUNCTIONAL
Brand: GMK SPHERE TOTAL KNEE SYSTEM

## 2022-03-28 DEVICE — RESURFACING PATELLA SIZE 4
Type: IMPLANTABLE DEVICE | Site: KNEE | Status: FUNCTIONAL
Brand: GMK PRIMARY TOTAL KNEE SYSTEM

## 2022-03-28 DEVICE — FIXED TIBIAL TRAY CEMENTED LEFT, SIZE 5
Type: IMPLANTABLE DEVICE | Site: KNEE | Status: FUNCTIONAL
Brand: GMK PRIMARY TOTAL KNEE SYSTEM

## 2022-03-28 DEVICE — FEMUR SPHERE CEMENTED LEFT, SIZE 6
Type: IMPLANTABLE DEVICE | Site: KNEE | Status: FUNCTIONAL
Brand: GMK SPHERE TOTAL KNEE SYSTEM

## 2022-03-28 DEVICE — CEMENT BNE 40 GM RADIOPAQUE BA SIMPLEX P: Type: IMPLANTABLE DEVICE | Site: KNEE | Status: FUNCTIONAL

## 2022-03-28 RX ORDER — SODIUM CHLORIDE 9 MG/ML
25 INJECTION, SOLUTION INTRAVENOUS PRN
Status: DISCONTINUED | OUTPATIENT
Start: 2022-03-28 | End: 2022-03-28 | Stop reason: HOSPADM

## 2022-03-28 RX ORDER — ONDANSETRON 2 MG/ML
4 INJECTION INTRAMUSCULAR; INTRAVENOUS
Status: DISCONTINUED | OUTPATIENT
Start: 2022-03-28 | End: 2022-03-28 | Stop reason: HOSPADM

## 2022-03-28 RX ORDER — SODIUM CHLORIDE 0.9 % (FLUSH) 0.9 %
5-40 SYRINGE (ML) INJECTION EVERY 12 HOURS SCHEDULED
Status: DISCONTINUED | OUTPATIENT
Start: 2022-03-28 | End: 2022-03-28 | Stop reason: HOSPADM

## 2022-03-28 RX ORDER — SODIUM CHLORIDE 0.9 % (FLUSH) 0.9 %
10 SYRINGE (ML) INJECTION EVERY 12 HOURS SCHEDULED
Status: DISCONTINUED | OUTPATIENT
Start: 2022-03-28 | End: 2022-03-28 | Stop reason: HOSPADM

## 2022-03-28 RX ORDER — SODIUM CHLORIDE, SODIUM LACTATE, POTASSIUM CHLORIDE, CALCIUM CHLORIDE 600; 310; 30; 20 MG/100ML; MG/100ML; MG/100ML; MG/100ML
INJECTION, SOLUTION INTRAVENOUS CONTINUOUS
Status: DISCONTINUED | OUTPATIENT
Start: 2022-03-28 | End: 2022-03-28 | Stop reason: HOSPADM

## 2022-03-28 RX ORDER — OXYCODONE HYDROCHLORIDE 5 MG/1
5 TABLET ORAL EVERY 4 HOURS PRN
Status: DISCONTINUED | OUTPATIENT
Start: 2022-03-28 | End: 2022-03-28 | Stop reason: HOSPADM

## 2022-03-28 RX ORDER — ACETAMINOPHEN 325 MG/1
650 TABLET ORAL EVERY 6 HOURS
Status: DISCONTINUED | OUTPATIENT
Start: 2022-03-28 | End: 2022-03-28 | Stop reason: HOSPADM

## 2022-03-28 RX ORDER — MEPERIDINE HYDROCHLORIDE 50 MG/ML
12.5 INJECTION INTRAMUSCULAR; INTRAVENOUS; SUBCUTANEOUS EVERY 5 MIN PRN
Status: DISCONTINUED | OUTPATIENT
Start: 2022-03-28 | End: 2022-03-28 | Stop reason: HOSPADM

## 2022-03-28 RX ORDER — SODIUM CHLORIDE 0.9 % (FLUSH) 0.9 %
10 SYRINGE (ML) INJECTION PRN
Status: DISCONTINUED | OUTPATIENT
Start: 2022-03-28 | End: 2022-03-28 | Stop reason: HOSPADM

## 2022-03-28 RX ORDER — DIPHENHYDRAMINE HYDROCHLORIDE 50 MG/ML
12.5 INJECTION INTRAMUSCULAR; INTRAVENOUS
Status: DISCONTINUED | OUTPATIENT
Start: 2022-03-28 | End: 2022-03-28 | Stop reason: HOSPADM

## 2022-03-28 RX ORDER — MORPHINE SULFATE 4 MG/ML
4 INJECTION, SOLUTION INTRAMUSCULAR; INTRAVENOUS
Status: DISCONTINUED | OUTPATIENT
Start: 2022-03-28 | End: 2022-03-28 | Stop reason: HOSPADM

## 2022-03-28 RX ORDER — OXYCODONE HYDROCHLORIDE 5 MG/1
10 TABLET ORAL EVERY 4 HOURS PRN
Status: DISCONTINUED | OUTPATIENT
Start: 2022-03-28 | End: 2022-03-28 | Stop reason: HOSPADM

## 2022-03-28 RX ORDER — OXYCODONE HYDROCHLORIDE 5 MG/1
5 TABLET ORAL EVERY 6 HOURS PRN
Qty: 28 TABLET | Refills: 0 | Status: SHIPPED | OUTPATIENT
Start: 2022-03-28 | End: 2022-04-04

## 2022-03-28 RX ORDER — METHYLPREDNISOLONE 4 MG/1
TABLET ORAL
Qty: 1 KIT | Refills: 0 | Status: SHIPPED | OUTPATIENT
Start: 2022-03-28 | End: 2022-10-07

## 2022-03-28 RX ORDER — LABETALOL HYDROCHLORIDE 5 MG/ML
10 INJECTION, SOLUTION INTRAVENOUS
Status: DISCONTINUED | OUTPATIENT
Start: 2022-03-28 | End: 2022-03-28 | Stop reason: HOSPADM

## 2022-03-28 RX ORDER — OXYCODONE HYDROCHLORIDE 5 MG/1
5 TABLET ORAL PRN
Status: DISCONTINUED | OUTPATIENT
Start: 2022-03-28 | End: 2022-03-28 | Stop reason: HOSPADM

## 2022-03-28 RX ORDER — MORPHINE SULFATE 2 MG/ML
2 INJECTION, SOLUTION INTRAMUSCULAR; INTRAVENOUS
Status: DISCONTINUED | OUTPATIENT
Start: 2022-03-28 | End: 2022-03-28 | Stop reason: HOSPADM

## 2022-03-28 RX ORDER — VANCOMYCIN HYDROCHLORIDE 1 G/20ML
INJECTION, POWDER, LYOPHILIZED, FOR SOLUTION INTRAVENOUS PRN
Status: DISCONTINUED | OUTPATIENT
Start: 2022-03-28 | End: 2022-03-28 | Stop reason: ALTCHOICE

## 2022-03-28 RX ORDER — PROPOFOL 10 MG/ML
INJECTION, EMULSION INTRAVENOUS CONTINUOUS PRN
Status: DISCONTINUED | OUTPATIENT
Start: 2022-03-28 | End: 2022-03-28 | Stop reason: SDUPTHER

## 2022-03-28 RX ORDER — BUPIVACAINE HYDROCHLORIDE 7.5 MG/ML
INJECTION, SOLUTION INTRASPINAL PRN
Status: DISCONTINUED | OUTPATIENT
Start: 2022-03-28 | End: 2022-03-28 | Stop reason: SDUPTHER

## 2022-03-28 RX ORDER — ONDANSETRON 2 MG/ML
4 INJECTION INTRAMUSCULAR; INTRAVENOUS EVERY 6 HOURS PRN
Status: DISCONTINUED | OUTPATIENT
Start: 2022-03-28 | End: 2022-03-28 | Stop reason: HOSPADM

## 2022-03-28 RX ORDER — TRANEXAMIC ACID 100 MG/ML
INJECTION, SOLUTION INTRAVENOUS PRN
Status: DISCONTINUED | OUTPATIENT
Start: 2022-03-28 | End: 2022-03-28 | Stop reason: SDUPTHER

## 2022-03-28 RX ORDER — CEPHALEXIN 500 MG/1
500 CAPSULE ORAL 4 TIMES DAILY
Qty: 4 CAPSULE | Refills: 0 | Status: SHIPPED | OUTPATIENT
Start: 2022-03-28 | End: 2022-10-07 | Stop reason: ALTCHOICE

## 2022-03-28 RX ORDER — MIDAZOLAM HYDROCHLORIDE 1 MG/ML
INJECTION INTRAMUSCULAR; INTRAVENOUS PRN
Status: DISCONTINUED | OUTPATIENT
Start: 2022-03-28 | End: 2022-03-28 | Stop reason: SDUPTHER

## 2022-03-28 RX ORDER — ASPIRIN 81 MG/1
81 TABLET ORAL 2 TIMES DAILY
Qty: 60 TABLET | Refills: 0 | Status: SHIPPED | OUTPATIENT
Start: 2022-03-29 | End: 2022-10-07

## 2022-03-28 RX ORDER — ONDANSETRON 4 MG/1
4 TABLET, FILM COATED ORAL 3 TIMES DAILY PRN
Qty: 15 TABLET | Refills: 0 | Status: SHIPPED | OUTPATIENT
Start: 2022-03-28 | End: 2022-10-07

## 2022-03-28 RX ORDER — CYCLOBENZAPRINE HCL 10 MG
10 TABLET ORAL 3 TIMES DAILY PRN
Qty: 30 TABLET | Refills: 0 | Status: SHIPPED | OUTPATIENT
Start: 2022-03-28 | End: 2022-04-07

## 2022-03-28 RX ORDER — MAGNESIUM HYDROXIDE 1200 MG/15ML
LIQUID ORAL CONTINUOUS PRN
Status: COMPLETED | OUTPATIENT
Start: 2022-03-28 | End: 2022-03-28

## 2022-03-28 RX ORDER — SODIUM CHLORIDE 0.9 % (FLUSH) 0.9 %
5-40 SYRINGE (ML) INJECTION PRN
Status: DISCONTINUED | OUTPATIENT
Start: 2022-03-28 | End: 2022-03-28 | Stop reason: HOSPADM

## 2022-03-28 RX ORDER — ONDANSETRON 8 MG/1
4 TABLET, ORALLY DISINTEGRATING ORAL EVERY 8 HOURS PRN
Status: DISCONTINUED | OUTPATIENT
Start: 2022-03-28 | End: 2022-03-28 | Stop reason: HOSPADM

## 2022-03-28 RX ORDER — OXYCODONE HYDROCHLORIDE 5 MG/1
10 TABLET ORAL PRN
Status: DISCONTINUED | OUTPATIENT
Start: 2022-03-28 | End: 2022-03-28 | Stop reason: HOSPADM

## 2022-03-28 RX ADMIN — MIDAZOLAM HYDROCHLORIDE 2 MG: 2 INJECTION, SOLUTION INTRAMUSCULAR; INTRAVENOUS at 09:19

## 2022-03-28 RX ADMIN — TRANEXAMIC ACID 1000 MG: 100 INJECTION, SOLUTION INTRAVENOUS at 09:45

## 2022-03-28 RX ADMIN — PROPOFOL 50 MCG/KG/MIN: 10 INJECTION, EMULSION INTRAVENOUS at 09:46

## 2022-03-28 RX ADMIN — Medication 20 MG: at 09:05

## 2022-03-28 RX ADMIN — CEFAZOLIN 2 G: 10 INJECTION, POWDER, FOR SOLUTION INTRAVENOUS at 09:31

## 2022-03-28 RX ADMIN — TRANEXAMIC ACID 1000 MG: 100 INJECTION, SOLUTION INTRAVENOUS at 11:55

## 2022-03-28 RX ADMIN — BUPIVACAINE HYDROCHLORIDE 1.5 ML: 7.5 INJECTION, SOLUTION SUBARACHNOID at 09:40

## 2022-03-28 RX ADMIN — SODIUM CHLORIDE, SODIUM LACTATE, POTASSIUM CHLORIDE, AND CALCIUM CHLORIDE: .6; .31; .03; .02 INJECTION, SOLUTION INTRAVENOUS at 09:30

## 2022-03-28 ASSESSMENT — PULMONARY FUNCTION TESTS
PIF_VALUE: 0

## 2022-03-28 ASSESSMENT — PAIN DESCRIPTION - FREQUENCY: FREQUENCY: OTHER (COMMENT)

## 2022-03-28 ASSESSMENT — PAIN - FUNCTIONAL ASSESSMENT: PAIN_FUNCTIONAL_ASSESSMENT: 0-10

## 2022-03-28 ASSESSMENT — PAIN DESCRIPTION - DESCRIPTORS: DESCRIPTORS: OTHER (COMMENT)

## 2022-03-28 ASSESSMENT — PAIN DESCRIPTION - ORIENTATION: ORIENTATION: OTHER (COMMENT)

## 2022-03-28 ASSESSMENT — PAIN SCALES - GENERAL: PAINLEVEL_OUTOF10: 0

## 2022-03-28 ASSESSMENT — PAIN DESCRIPTION - LOCATION: LOCATION: OTHER (COMMENT)

## 2022-03-28 ASSESSMENT — PAIN DESCRIPTION - PROGRESSION: CLINICAL_PROGRESSION: OTHER (COMMENT)

## 2022-03-28 ASSESSMENT — PAIN DESCRIPTION - PAIN TYPE: TYPE: OTHER (COMMENT)

## 2022-03-28 ASSESSMENT — PAIN DESCRIPTION - ONSET: ONSET: OTHER (COMMENT)

## 2022-03-28 NOTE — ANESTHESIA PROCEDURE NOTES
Peripheral Block    Patient location during procedure: pre-op  Staffing  Performed: anesthesiologist   Anesthesiologist: Karen Moser MD  Preanesthetic Checklist  Completed: patient identified, IV checked, site marked, risks and benefits discussed, surgical consent, monitors and equipment checked, pre-op evaluation, timeout performed, anesthesia consent given, oxygen available and patient being monitored  Peripheral Block  Patient position: supine  Prep: ChloraPrep  Patient monitoring: continuous pulse ox and IV access  Block type: Femoral  Laterality: left  Injection technique: single-shot  Guidance: ultrasound guided  Infiltration strength: 1 %  Dose: 3 mL  Adductor canal (Low Femoral)  Provider prep: mask and sterile gloves  Needle  Needle type: combined needle/nerve stimulator   Needle gauge: 21 G  Needle length: 10 cm  Needle localization: ultrasound guidance  Assessment  Injection assessment: negative aspiration for heme, no paresthesia on injection and local visualized surrounding nerve on ultrasound  Paresthesia pain: none  Slow fractionated injection: yes  Hemodynamics: stable  Additional Notes  Immediately prior to procedure a \"time out\" was called to verify the correct patient, allergies, laterality, procedure and equipment. Time out performed with  RN    Local Anesthetic: 0.5 %  Bupivacaine plus epi 1 to 200K  Amount: 20 ml  in 5 ml increments after negative aspiration each time.       Reason for block: post-op pain management and at surgeon's request

## 2022-03-28 NOTE — PROGRESS NOTES
Physical Therapy    Facility/Department: [facility] OR  Initial Assessment, Treatment, and Discharge Summary    NAME: Jennie Donald  : 1955  MRN: [id number]    Date of Service: 3/28/2022    Discharge Recommendations:  24 hour supervision or assist,Outpatient PT   PT Equipment Recommendations  Equipment Needed: No  Other: pt owns SW    Assessment   Body structures, Functions, Activity limitations: Decreased functional mobility ; Decreased balance;Decreased posture;Decreased ROM; Decreased strength;Decreased endurance  Assessment: Pt is 76 yo male who presents POD 0 s/p left TKA. Pt lives alone but does have family staying with him at d/c. Indep with mobility at baseline. CGA progressing to SBA with mobility this date including gait, transfers, and curb step training. Pt declines concerns about mobility at home. Educated on car transfer and pt verbalized understanding. Declined car transfer handout. Handout of HEP given. Will d/c acute PT d/t goals met. Recommend home with intial 24-hr Sup and OP PT at d/c. Treatment Diagnosis: impaired functional mobility  Prognosis: Good  Decision Making: Low Complexity  PT Education: Goals; General Safety;Gait Training;PT Role;Disease Specific Education;Plan of Care; Functional Mobility Training;Home Exercise Program;Precautions;Transfer Training;Energy Conservation;Weight-bearing Education  Patient Education: Pt educated on importance of mobility, safe mobility with AD, TKA HEP, safe car transfer technique, and WBAT. Pt verbalized understanding  Barriers to Learning: none  No Skilled PT: Safe to return home  REQUIRES PT FOLLOW UP: No  Activity Tolerance  Activity Tolerance: Patient Tolerated treatment well  Activity Tolerance: /87, HR 60; SpO2 100% on RA       Patient Diagnosis(es): The encounter diagnosis was Localized osteoarthritis of left knee. has a past medical history of Allergic rhinitis, Arthritis, and Osteoarthritis.    has a past surgical history that includes joint replacement (2008); bone graft; Knee arthroscopy (Left, 8/20/2014); Shoulder arthroscopy (Left, 10/28/14); Tonsillectomy and adenoidectomy (Bilateral, 1960); Carpal tunnel release (Right, 03/14/2017); Carpal tunnel release (Left, 04/11/2017); Hand surgery (Left, 12/12/2017); Colonoscopy (2008); Colonoscopy (02/26/2018); hernia repair (Bilateral, 08/23/2018); and pr repair ing hernia,5+y/o,reducibl (Bilateral, 8/23/2018). Restrictions  Restrictions/Precautions  Restrictions/Precautions: Weight Bearing,General Precautions  Required Braces or Orthoses?: Yes  Lower Extremity Weight Bearing Restrictions  Left Lower Extremity Weight Bearing: Weight Bearing As Tolerated  Required Braces or Orthoses  Left Lower Extremity Brace: Knee Immobilizer  LLE Brace Type: When ambulating until able to perform SLR in PT    Subjective  General  Chart Reviewed: Yes  Patient assessed for rehabilitation services?: Yes  Response To Previous Treatment: Not applicable  Family / Caregiver Present: No  Referring Practitioner: Andrzej Hart PA-C  Referral Date : 03/28/22  Diagnosis: s/p left TKA  Follows Commands: Within Functional Limits  General Comment  Comments: Pt resting in bed on approach; RN cleared pt for therapy  Subjective  Subjective: pt agreeable to therapy. Denies pain  Pain Screening  Patient Currently in Pain: Denies    Orientation  Orientation  Overall Orientation Status: Within Functional Limits  Social/Functional History  Social/Functional History  Lives With: Alone (family staying to provide 24-hr Supervision/assist)  Type of Home: House  Home Layout: One level  Home Access: Stairs to enter with rails  Entrance Stairs - Number of Steps: 5 curb steps.  Per pt steps are wide enough to fit walker  Entrance Stairs - Rails: Right  Bathroom Shower/Tub: Tub/Shower unit  Bathroom Toilet: Standard  Bathroom Equipment: Toilet raiser,Shower chair  Home Equipment: Reacher,Standard walker  ADL Assistance: [address] Assistance: Independent  Homemaking Responsibilities: Yes  Ambulation Assistance: Independent  Transfer Assistance: Independent  Active : Yes  Occupation: Full time employment  Type of occupation:  for [employer]  Additional Comments: Denies falls    Objective     AROM RLE (degrees)  RLE AROM: WFL  AROM LLE (degrees)  LLE AROM : WFL  LLE General AROM: knee ~5-85 degrees  Strength RLE  Strength RLE: WFL  Strength LLE  Comment: hip and ankle WFL; indep SLR           Bed mobility  Supine to Sit: Stand by assistance (HOB elevated, use of rails)  Sit to Supine: Unable to assess (pt seated EOB at end of session)     Transfers  Sit to Stand: Contact guard assistance;Stand by assistance (CGA for first stand from EOB to RW progressing to SBA. Cues for safe hand placement)  Stand to sit: Contact guard assistance     Ambulation  Ambulation?: Yes  WB Status: WBAT LLE  Ambulation 1  Surface: level tile  Device: Rolling Walker  Assistance: Contact guard assistance;Stand by assistance  Quality of Gait: CGA progressing to SBA. Cues for sequencing and step to pattern. No LOB or left knee buckling noted. Cues for posture  Gait Deviations: Slow Nancy; Shuffles;Decreased step length;Decreased step height  Distance: 30 ft    Stairs/Curb  Stairs?: Yes  Stairs  Curbs: 6\"  Device: Rolling walker  Assistance: Contact guard assistance  Comment: CGA for first trial and SBA for second trial. Pt negotiated curb step x 2 reps with initial cues for technique/sequencing. No LOB or left knee buckling        Balance  Sitting - Static: Good  Sitting - Dynamic: Good  Standing - Static: Good;-  Standing - Dynamic: Fair;+     Exercises  Straight Leg Raise: x 10 LLE Indep  Quad Sets: x 10 BLE  Heelslides: x 10 LLE  Gluteal Sets: x 10 BLE  Knee Short Arc Quad: x 10 LLE  Ankle Pumps: x 10 BLE  Comments: Cues for technique. Handout of TKA HEP given     Plan   Plan  Times per week: d/c acute PT  Safety Devices  Type of devices:  All fall risk precautions in place,Call light within reach,Nurse notified,Gait belt (pt left seated EOB at end of session)            Goals  Short term goals  Time Frame for Short term goals: 1 PT session  Short term goal 1: Pt will be CGA with transfers with RW -- GOAL MET 3/28  Short term goal 2: Pt will ambulate 30 ft CGA with RW-- GOAL MET 3/28  Short term goal 3: Pt will negotiate curb step with RW and CGA -- GOAL MET 3/28  Short term goal 4: Pt will participate in TKA HEP -- GOAL MET 3/28  Patient Goals   Patient goals : \"to walk in the room without physical assist\" -- GOAL MET 3/28       Therapy Time   Individual Concurrent Group Co-treatment   Time In 1425         Time Out 1505         Minutes 40         Timed Code Treatment Minutes: 10 [place]ia Park, PT, DPT

## 2022-03-28 NOTE — ANESTHESIA PROCEDURE NOTES
Spinal Block    Patient location during procedure: OR  Start time: 3/28/2022 9:40 AM  Reason for block: primary anesthetic and at surgeon's request  Staffing  Performed: resident/CRNA   Anesthesiologist: Jhon Villalobos MD  Resident/CRNA: FÉLIX Quispe CRNA  Preanesthetic Checklist  Completed: patient identified, IV checked, site marked, risks and benefits discussed, surgical consent, monitors and equipment checked, pre-op evaluation, timeout performed, anesthesia consent given, oxygen available and patient being monitored  Spinal Block  Patient position: sitting  Prep: Betadine  Patient monitoring: continuous pulse ox and frequent blood pressure checks  Approach: midline  Location: L3/L4  Provider prep: mask and sterile gloves  Local infiltration: lidocaine  Agent: bupivacaine  Dose: 1.5  Dose: 1.5  Needle  Needle type:  Vahid   Needle gauge: 25 G  Needle length: 3.5 in  Assessment  Sensory level: T10  Swirl obtained: Yes  CSF: clear  Attempts: 1  Hemodynamics: stable

## 2022-03-28 NOTE — PROGRESS NOTES
This RN confirmed correct site marked by provider. Patient ready for surgery. Left knee clipped and wiped with CHG wipes.  IS education completed, please see doc flow sheet for details  Mavis Cowan, RN

## 2022-03-28 NOTE — PROGRESS NOTES
Vitals stopped flowing over to chart. Patient had stable vitals for entirety of stay in [facility] and Phase II. Patient HR is mannie but regular. Patient on room air. PT worked with patient who was able to ambulate without difficulty. Patient ate, drank, ambulated without trouble but denies need to void at this time. Discharge instructions were given to sister of patient who expressed understanding.

## 2022-03-28 NOTE — PROGRESS NOTES
Occupational Therapy         Orders received, chart reviewed. PT evaluation completed, PT verbalized patient with no OT needs and they educated patient on safe car transfers. Will sign off. Pt off floor for testing. Will continue to follow. Thank you.   Electronically signed by Yuni Tristan OT on 3/28/2022 at 3:17 PM

## 2022-03-28 NOTE — ANESTHESIA PRE PROCEDURE
Department of Anesthesiology  Preprocedure Note       Name:  Leeroy Sumner   Age:  77 y.o.  :  1955                                          MRN:  [id number]         Date:  3/28/2022      Surgeon: Boy Laird): David Alejandro MD    Procedure: Procedure(s):  LEFT TOTAL KNEE ARTHROPLASTY - COMPLEX WITH ADDUCTOR CANAL BLOCK                         MEDACTA    Medications prior to admission:   Prior to Admission medications    Medication Sig Start Date End Date Taking?  Authorizing Provider   mupirocin (BACTROBAN) 2 % ointment Apply twice daily to each nare for the  5 days prior to surgical procedure 22   David Alejandro MD   Misc Natural Products (TART CHERRY ADVANCED) CAPS Take 1 capsule by mouth daily     Historical Provider, MD   famotidine (PEPCID) 20 MG tablet Take 1 tablet by mouth 2 times daily 18   Adele Cunningham DO   LUTEIN PO Take 1 tablet by mouth daily     Historical Provider, MD   Multiple Vitamins-Minerals (VITEYES AREDS FORMULA PO) Take 1 tablet by mouth daily     Historical Provider, MD   Omega-3 Fatty Acids (OMEGA 3 PO) Take 1 capsule by mouth 3 times daily     Historical Provider, MD   calcium carbonate (OSCAL) 500 MG TABS tablet Take 500 mg by mouth 3 times daily     Historical Provider, MD   Glucosamine-Chondroitin (GLUCOSAMINE CHONDR COMPLEX PO) Take 1 tablet by mouth daily     Historical Provider, MD       Current medications:    Current Facility-Administered Medications   Medication Dose Route Frequency Provider Last Rate Last Admin    tranexamic acid (CYKLOKAPRON) 1,000 mg in dextrose 5 % 100 mL IVPB  1,000 mg IntraVENous On Call to [facility] [employer], PA-C        ceFAZolin (ANCEF) 2000 mg in dextrose 5 % 100 mL IVPB  2,000 mg IntraVENous On Call to [facility] [employer], PA-C        famotidine (PEPCID) 20 mg in sodium chloride (PF) 10 mL injection  20 mg IntraVENous Once Ruby Pierre MD        lactated ringers infusion   IntraVENous Continuous Ruby Pierre MD  sodium chloride flush 0.9 % injection 10 mL  10 mL IntraVENous 2 times per day Angel Caballero MD        sodium chloride flush 0.9 % injection 10 mL  10 mL IntraVENous PRN Angel Caballero MD        0.9 % sodium chloride infusion  25 mL IntraVENous PRN Angel Caballero MD           Allergies:     Allergies   Allergen Reactions    Naprosyn [Naproxen] Rash       Problem List:    Patient Active Problem List   Diagnosis Code    Pain in joint, lower leg left M25.569    Pain in joint, shoulder region left M25.519    Neck pain M54.2    Neck arthritis C5 6 and 7 M47.812    Tendinitis of left rotator cuff M75.82    Left rotator cuff tear M75.102    Status post right hip replacement 2008 Z96.641    Left knee DJD M17.12    Chondromalacia patellae of left knee M22.42    Arthritis of left acromioclavicular joint M19.012    Tear of lateral meniscus of left knee S83.282A    Acquired valgus deformity left knee M21.069    Tear of medial meniscus of left knee S83.242A    Biceps tendinitis on left M75.22    Labral tear of left shoulder S43.439A    S/P left knee arthroscopy, chondroplasty, synovectomy, removal loose bodies, and partial medial and lateral meniscectomy 8/20/2014 Z98.890    S/P left rotator cuff repair, arthroscopy, debridement of rotator cuff, Taylor, Neer, and biceps tenotomy 10/28/2014 Z98.890    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    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    Carpal tunnel syndrome, bilateral G56.03    Wrist arthritis M19.039    Perennial allergic rhinitis J30.89    Left wrist pain M25.532    Bilateral inguinal hernia without obstruction or gangrene K40.20    Localized osteoarthritis of left knee M17.12       Past Medical History:        Diagnosis Date    Allergic rhinitis     Arthritis     Osteoarthritis        Past Surgical History:        Procedure Laterality Date    BONE GRAFT      rt wrist  age 27   [facility] CARPAL TUNNEL RELEASE Right 03/14/2017    CARPAL TUNNEL RELEASE Left 04/11/2017    with excision volar wrist ganglion    COLONOSCOPY  2008    no polyps    COLONOSCOPY  02/26/2018    diverticulosis    HAND SURGERY Left 12/12/2017    Proximal row carpectomy    HERNIA REPAIR Bilateral 08/23/2018     ROBOTIC INGUINAL HERNIA REPAIR WITH MESH    JOINT REPLACEMENT  2008    rt total hip    KNEE ARTHROSCOPY Left 8/20/2014    LEFT KNEE ARTHROSCOPY, CHONDROPLASTY, SYNOVECTOMY, PARTIAL    KY REPAIR ING HERNIA,5+Y/O,REDUCIBL Bilateral 8/23/2018    ROBOTIC INGUINAL HERNIA REPAIR WITH MESH performed by Laly Smith MD at [place] [address] ARTHROSCOPY Left 10/28/14    debridement,rotator cuff repair    TONSILLECTOMY AND ADENOIDECTOMY Bilateral 1960       Social History:    Social History     Tobacco Use    Smoking status: Never Smoker    Smokeless tobacco: Never Used   Substance Use Topics    Alcohol use: Yes     Alcohol/week: 0.0 standard drinks     Comment: 1-3 beers a week. Counseling given: Not Answered      Vital Signs (Current):   Vitals:    03/22/22 1151   Weight: 210 lb (95.3 kg)   Height: 6' 2\" (1.88 m)                                              BP Readings from Last 3 Encounters:   03/09/22 132/80   11/04/21 116/78   10/12/20 134/78       NPO Status: Time of last liquid consumption: 2140                        Time of last solid consumption: 2000                        Date of last liquid consumption: 03/27/22                        Date of last solid food consumption: 03/27/22    BMI:   Wt Readings from Last 3 Encounters:   03/22/22 210 lb (95.3 kg)   03/09/22 218 lb (98.9 kg)   11/23/21 219 lb (99.3 kg)     Body mass index is 26.96 kg/m².     CBC:   Lab Results   Component Value Date    WBC 6.3 03/11/2022    RBC 5.08 03/11/2022    HGB 15.9 03/11/2022    HCT 46.3 03/11/2022    MCV 91.1 03/11/2022    RDW 12.7 03/11/2022     03/11/2022       CMP:   Lab Results   Component Value Date    NA 140 03/11/2022    K 4.8 03/11/2022     03/11/2022    CO2 25 03/11/2022    BUN 18 03/11/2022    CREATININE 1.0 03/11/2022    GFRAA >60 03/11/2022    AGRATIO 1.8 11/01/2021    LABGLOM >60 03/11/2022    GLUCOSE 105 03/11/2022    PROT 6.7 11/01/2021    CALCIUM 9.7 03/11/2022    BILITOT 0.6 11/01/2021    ALKPHOS 90 11/01/2021    AST 19 11/01/2021    ALT 8 11/01/2021       POC Tests: No results for input(s): POCGLU, POCNA, POCK, POCCL, POCBUN, POCHEMO, POCHCT in the last 72 hours. Coags:   Lab Results   Component Value Date    PROTIME 11.4 03/11/2022    INR 1.01 03/11/2022    APTT 39.2 03/11/2022       HCG (If Applicable): No results found for: PREGTESTUR, PREGSERUM, HCG, HCGQUANT     ABGs: No results found for: PHART, PO2ART, LVH2ECS, DGY8PUA, BEART, J4EAPAEA     Type & Screen (If Applicable):  No results found for: LABABO, LABRH    Drug/Infectious Status (If Applicable):  No results found for: HIV, HEPCAB    COVID-19 Screening (If Applicable):   Lab Results   Component Value Date    COVID19 Not Detected 03/22/2022           Anesthesia Evaluation   no history of anesthetic complications:   Airway: Mallampati: II  TM distance: >3 FB   Neck ROM: full  Mouth opening: > = 3 FB Dental: normal exam         Pulmonary:Negative Pulmonary ROS                              Cardiovascular:Negative CV ROS                      Neuro/Psych:   (+) neuromuscular disease:,             GI/Hepatic/Renal: Neg GI/Hepatic/Renal ROS            Endo/Other:    (+) : arthritis: OA., .                 Abdominal:             Vascular: negative vascular ROS. Other Findings:           Anesthesia Plan      spinal     ASA 2     (Risks, benefits and alternatives of spinal anesthetic (+/- GA if needed) discussed with pt as well as ultrasound guided adductor canal block for post op analgesia. Questions answered. Willing to proceed.)  Induction: intravenous. Anesthetic plan and risks discussed with patient.                       CHARISSA Hawk Fitzpatrick MD   3/28/2022

## 2022-03-28 NOTE — OP NOTE
Orthopaedic Surgery  Operative Report      Patient Name:  Glenn Crawford  Patient :  1955  MRN: [id number]    Date: 22     Pre-operative Diagnosis:   M17.12 Primary Osteoarthritis   Severe valgus gonarthrosis    Post-operative Diagnosis:    Same    Procedure: LEFT  95888 Total Knee Arthroplasty    modifier 22    Surgeon:  Surgeon(s) and Role:     * Iris Hernandez MD - Primary    Assistant: Circulator: Rex Javier RN; Breanna Melgar RN  Surgical Assistant: Leigh Price RN; Kimberlee White Scrub: Rashmi Velez  Scrub Person First: Yenny Kan  Scrub Person Second: Arturo Verduzco RN    Anesthesia: General endotracheal anesthesia, Intraoperative local infiltration - Ortho-cocktail mixture, Regional with adductor canal block and Placement of indwelling catheter    Estimated blood loss: 150    Specimens: * No specimens in log *    Complications: None    Drains: None    Condition: Stable    Implants:   Implant Name Type Inv. Item Serial No.  Lot No. LRB No. Used Action   CEMENT BNE 40 GM RADIOPAQUE BA SIMPLEX P - [id number]  CEMENT BNE 40 GM RADIOPAQUE BA SIMPLEX P  [employer] [facility] [id number] Left 1 Implanted   CEMENT BNE 40 GM RADIOPAQUE BA SIMPLEX P - [id number]  CEMENT BNE 40 GM RADIOPAQUE BA SIMPLEX P  [employer] [facility] [id number] Left 1 Implanted   COMPONENT TIB SZ 5 LT FIX SAMANTHA GMK - [id number]  COMPONENT TIB SZ 5 LT FIX SAMNATHA GMK  [employer] [facility] [id number] Left 1 Implanted   COMPONENT PAT SZ 4 RESURF AUG GMK - [id number]  COMPONENT PAT SZ 4 RESURF AUG GMK  [employer] [facility] [id number] Left 1 Implanted   COMPONENT FEM SZ 6 LT SAMANTHA GMK SPHR - [id number]  COMPONENT FEM SZ 6 LT SAMANTHA GMK SPHR  [employer] [facility] [id number] Left 1 Implanted   INSERT TIB SZ 5 [id number] LT FLX GMK SPHR - [id number]  INSERT TIB SZ 5 [id number] LT FLX GMK SPHR  [employer] [facility] [id number] Left 1 Implanted       Findings:   1. End stage OA  2. Severe valgus gonarthrosis  3.   Significant 20 degree flexion contracture    Indications: The patient has been battling left knee pain for months to years. Pain has gotten worse recently. Patient has failed all preoperative conservative treatment options. The activities of daily living have been affected quite a bit. Patient wanted to regain mobility and be active as possible. Patient understood the risk benefits and alternatives in detail and wanted to proceed with the above operation. He understood that this case is more complex than standard knee replacement, in the setting of severe stiff valgus deformity as well as 20 degree flexion contracture. Procedure Details:   I marked the surgical site of the left knee for surgery. He was taken back to the operating theater laid supine the table the bony prominences well-padded. General anesthesia was induced. We transferred the patient to the operating table supine. The leg was prepped and draped in standard sterile fashion. An appropriate leg positioner,DeMayo , was used to stabilize the extremity. Antibiotics 2 g of Ancef were given. MRSA swab testing was performed preop, and no additional antibiotics were required. Tranexamic acid 1 g was given at start. Tourniquet was only used through the cementing portion of the case. Overall time was 14 minutes. We began with an anterior approach to the knee. Thick soft tissue flaps were developed. Medial parapatellar arthrotomy was extended through the fascial tissue. Underlying synovial fluid was evacuated. Small synovectomy was performed just superior to the distal femoral cartilage. Part of the infrapatellar fat pad was removed for exposure. A controlled lateral release just lateral to the patella bone was created for exposure. Patella was then everted and the knee flexed. 2 Hohmann type retractors were then placed to deliver the distal femur.     [employer] Patient specific instrumentation:  Electro Bovie cautery was then used to remove any existing cartilage tissue away from the footprints of the 3 dimensionally printed guides. This custom cutting block was then opposed to the distal femur and fixed with 3 threaded pins. Another threaded pin was then used to sandrine rotation through this guide. The resection levels were then double confirmed using erwni wing, both medial and lateral.  An oscillating saw blade was then used to create a distal femoral resection. I added 2 mm of the distal femoral resection, planning for a flexion contracture correction. The resections were then measured and the appropriate thicknesses were then achieved. This recreated our planned resection level based on her patient specific CT-based guide preoperatively. The rotational pins were then inserted. 4-in-1 cutting block was then placed on the distal femoral bone. Appropriate rotation was then achieved. 2 screws then stabilized this block in addition. An oscillating saw blade then removed the posterior resections. Once again, these resections were measured to confirm appropriate restoration of restricted kinematic protocol, based on our preoperative CT based plan. The remaining 3 cuts were then made. The notch tissue was then cleaned out and the edges of the femoral resections were then cleaned using a rongeur and electrocautery Bovie. Attention was then turned toward tibial exposure. The same was performed for the tibia as the femur. Existing cartilage tissue was then removed using electrocautery away from the footprints. A custom cutting block was then opposed to the proximal tibia and fixed with 3 threaded pins. Overall alignment restoration was then confirmed using a drop villa. This was designed in a way to make this parallel to the tibial shaft axis as planned per resection angle varus valgus, and slope.   The resection levels were then triple confirmed using erwin wing, both medial and lateral.  An oscillating saw blade was then used to create the proximal tibial resection. This resection was also measured and the appropriate thickness was also restored. Lamina spreaders were then used to deliver the meniscal tissue to separate the distal femur and the proximal tibia. Electro Bovie cautery was then used to remove the meniscal remnants. Curved osteotome removed any posterior osteophytes. Resections were then cleaned up and ready for trial.    Flexion and extension gap blocks were then assessed. He was tight laterally in flexion. I created a soft tissue release. I released most of the popliteus tendon and part of the posterior LCL to help open up this gap. In extension however, the knee was quite balanced. I was happy with size 10 block in flexion and extension. Appropriate trials were then inserted. 10 mm thickness CS-medial pivot poly was used for the trial.  I checked this in extension and there was no laxity present medial or lateral.  In mid flexion, varus valgus testing resulted in femoral rotation as ideally expected. A 90 degrees flexion, there was no anterior to posterior instability. The medial pivot served as the hinge through which the proximal tibia was rotating. There was no laxity present medially under stress, and a small bit of laxity present laterally under stress as expected to restore native kinematics. The patella was then exposed. 2 point-to-point tenaculums then delivered and stabilized the patella. Appropriate resection freehand was then conducted of the patella. Thickness was confirmed pre and post.  A minimum of 12-14 mm was retained of existing bone. Size 4 patella was then trialed. The knee was taken through range of motion with all trial components in. The patella tracked midline in the femoral trochlea. 2 lug holes were drilled. The femoral finishing guide was used to create trochlear tracking groove. All trial components were then removed. Third-generation cement technique was then employed.   Tourniquet was then insufflated. The back surfaces of the implants were coated immediately after cement was mixed and ready. The cut bone surfaces were also washed and dried until cancellous bone was visualized. Appropriate cement was then placed at the proximal tibial surface. The tibial baseplate was then impacted first.  Excess cement was removed. The actual polyethylene liner was then inserted. The distal femur was then exposed. Again, cement was placed onto the distal femoral surface. The actual component was then impacted into position with no complications. Excess was again removed. The knee was then extended. Small bit of cement was used around the patella to secure the patellar button. This was held in position for a minimum of 15 minutes and until the cement had hardened. Insertion of adductor canal regional catheter: While the cement was curing, I made a small 1 cm window within the synovium on the medial side of the distal femur. I used a tonsil to gain access to the adductor canal bluntly. I used a retrograde technique for passing the adductor canal catheter where I entered the skin medially behind the synovium. I was able to bring the needle directly through the hole within the synovium. I then inserted the catheter using this technique. I was able to use a pituitary rongeur to deliver the catheter proximally up the canal. I then used 2-0 Vicryl in a running manner to help reapproximate the synovial lining, such that the catheter was completely extra-articular behind the synovium. Tourniquet was then let down. Hemostasis was then achieved again. Excess cement was chipped away and removed to avoid impingement and free bodies. The stability and unrestricted motion of the knee was then confirmed once more. We performed sequential closure. I irrigated the wound thoroughly with 3 L normal saline. I placed 2 g of vancomycin powder around the wound.   I also injected a mixture of Marcaine and

## 2022-03-28 NOTE — ANESTHESIA POSTPROCEDURE EVALUATION
Department of Anesthesiology  Postprocedure Note    Patient: Julio Cesar Maravilla  MRN: [id number]  YOB: 1955  Date of evaluation: 3/28/2022  Time:  6:38 PM     Procedure Summary     Date: 03/28/22 Room / Location: [facility]    Anesthesia Start: 4131 Anesthesia Stop: 0926    Procedure: LEFT TOTAL KNEE ARTHROPLASTY - COMPLEX WITH ADDUCTOR CANAL BLOCK                         MEDACTA (Left Knee) Diagnosis:       Primary osteoarthritis of left knee      (PRIMARY OSTEOARTHRITIS LEFT KNEE)    Surgeons: Jessica Angulo MD Responsible Provider: Stephanie Ochoa MD    Anesthesia Type: spinal ASA Status: 2          Anesthesia Type: spinal    Sherif Phase I: Sherif Score: 10    Sherif Phase II: Sherif Score: 10    Last vitals: Reviewed and per EMR flowsheets. Anesthesia Post Evaluation    Patient location during evaluation: PACU  Patient participation: complete - patient participated  Level of consciousness: awake and alert  Airway patency: patent  Nausea & Vomiting: no nausea and no vomiting  Complications: no  Cardiovascular status: blood pressure returned to baseline  Respiratory status: acceptable  Hydration status: euvolemic  Comments: VSS on transfer to phase 2 recovery. No anesthetic complications.

## 2022-03-28 NOTE — H&P
Update History & Physical     The patient's History and Physical of 3/9/2022 was reviewed with the patient and I examined the patient. There was no change. The surgical site was confirmed by the patient and me. Plan: The risks, benefits, expected outcome, and alternative to the recommended procedure have been discussed with the patient / family. Patient understands and wants to proceed with the procedure.       Electronically signed by Eulalia Iyer MD on 3/28/2022 at 9:25 AM

## 2022-03-30 ENCOUNTER — APPOINTMENT (OUTPATIENT)
Dept: PHYSICAL THERAPY | Age: 67
End: 2022-03-30
Payer: COMMERCIAL

## 2022-03-30 ENCOUNTER — HOSPITAL ENCOUNTER (OUTPATIENT)
Dept: PHYSICAL THERAPY | Age: 67
Setting detail: THERAPIES SERIES
Discharge: HOME | End: 2022-03-30
Payer: COMMERCIAL

## 2022-03-30 ENCOUNTER — TELEPHONE (OUTPATIENT)
Dept: ORTHOPEDIC SURGERY | Age: 67
End: 2022-03-30

## 2022-03-30 PROCEDURE — 97110 THERAPEUTIC EXERCISES: CPT

## 2022-03-30 PROCEDURE — 97161 PT EVAL LOW COMPLEX 20 MIN: CPT

## 2022-03-30 PROCEDURE — 97016 VASOPNEUMATIC DEVICE THERAPY: CPT

## 2022-03-30 NOTE — PLAN OF CARE
[facility]  Orthopaedics and Sports Rehabilitation, [place] [address] [place], [address] [facility] [address]  Phone: [phone]   Fax:     [phone]       Physical Therapy Certification    Dear Referring Practitioner: Dr. Eleazar Cary,    We had the pleasure of evaluating the following patient for physical therapy services at [address] [place] [address]. A summary of our findings can be found in the initial assessment below. This includes our plan of care. If you have any questions or concerns regarding these findings, please do not hesitate to contact me at the office phone number checked above. Thank you for the referral.       Physician Signature:_______________________________Date:__________________  By signing above (or electronic signature), therapists plan is approved by physician      Patient: Toño Beltran   : 1955   MRN: [id number]  Referring Physician: Referring Practitioner: Dr. Eleazar Cary      Evaluation Date: 3/30/2022      Medical Diagnosis Information:  Diagnosis: s/p L TKA K75.384   Treatment Diagnosis: Left knee pain M25. 562                                         Insurance information: PT Insurance Information: [place]     Precautions/ Contra-indications: s/p L TKA 3/28/22; hx R RUBY      C-SSRS Triggered by Intake questionnaire (Past 2 wk assessment):   [x] No, Questionnaire did not trigger screening.   [] Yes, Patient intake triggered further evaluation      [] C-SSRS Screening completed  [] PCP notified via Plan of Care  [] Emergency services notified     Latex Allergy:  [x]NO      []YES  Preferred Language for Healthcare:   [x]English       []other:    SUBJECTIVE: Patient stated complaint: Pt had Prehab appt, had L TKA 3/28/22.      Relevant Medical History: OA, hx L knee cope, hs R RUBY  Functional Disability Index:  LEFS 21.5/80    Pain Scale: 4-6/10  Easing factors: standing  Provocative factors: sitting     Type: [x]Constant   [x]Intermittent  []Radiating []Localized []other:     Numbness/Tingling: denies    Occupation/School:     Living Status/Prior Level of Function: Independent with ADLs and IADLs. OBJECTIVE:     ROM LEFT RIGHT   HIP Flex     HIP Abd     HIP Ext     HIP IR     HIP ER     Knee ext Lacking 16 deg    Knee Flex 80    Ankle PF     Ankle DF     Ankle In     Ankle Ev     Strength  LEFT RIGHT   HIP Flexors     HIP Abductors     HIP Ext     Hip ER     Knee EXT (quad) poor    Knee Flex (HS)     Ankle DF     Ankle PF     Ankle Inv     Ankle EV          Circumference  Mid apex  7 cm prox             Reflexes/Sensation:    [x]Dermatomes/Myotomes intact    [x]Reflexes equal and normal bilaterally   []Other:    Joint mobility:    []Normal    []Hypo   []Hyper    Palpation: PO TTP    Functional Mobility/Transfers: I with transfers    Posture: WFL    Bandages/Dressings/Incisions: no current drainage noted    Gait: (include devices/WB status) step to with standard walker    Orthopedic Special Tests: NA                       [x] Patient history, allergies, meds reviewed. Medical chart reviewed. See intake form. Review Of Systems (ROS):  [x]Performed Review of systems (Integumentary, CardioPulmonary, Neurological) by intake and observation. Intake form has been scanned into medical record. Patient has been instructed to contact their primary care physician regarding ROS issues if not already being addressed at this time.       Co-morbidities/Complexities (which will affect course of rehabilitation):    []None           Arthritic conditions   []Rheumatoid arthritis (M05.9)  [x]Osteoarthritis (M19.91)   Cardiovascular conditions   []Hypertension (I10)  []Hyperlipidemia (E78.5)  []Angina pectoris (I20)  []Atherosclerosis (I70)   Musculoskeletal conditions   []Disc pathology   []Congenital spine pathologies   [x]Prior surgical intervention  []Osteoporosis (M81.8)  []Osteopenia (M85.8)   Endocrine conditions   []Hypothyroid (E03.9)  []Hyperthyroid Gastrointestinal conditions   []Constipation (L96.37)   Metabolic conditions   []Morbid obesity (E66.01)  []Diabetes type 1(E10.65) or 2 (E11.65)   []Neuropathy (G60.9)     Pulmonary conditions   []Asthma (J45)  []Coughing   []COPD (J44.9)   Psychological Disorders  []Anxiety (F41.9)  []Depression (F32.9)   []Other:   []Other:          Barriers to/and or personal factors that will affect rehab potential:              [x]Age  []Sex              []Motivation/Lack of Motivation                        [x]Co-Morbidities              []Cognitive Function, education/learning barriers              []Environmental, home barriers              []profession/work barriers  []past PT/medical experience  []other:  Justification:      Falls Risk Assessment (30 days):    [x] Falls Risk assessed and no intervention required.   [] Falls Risk assessed and Patient requires intervention due to being higher risk   TUG score (>12s at risk):     [] Falls education provided, including       G-Codes:       ASSESSMENT:    Functional Impairments:     []Noted lumbar/proximal hip/LE joint hypomobility   [x]Decreased LE functional ROM   [x]Decreased core/proximal hip strength and neuromuscular control   [x]Decreased LE functional strength   [x]Reduced balance/proprioceptive control   []other:      Functional Activity Limitations (from functional questionnaire and intake)   [x]Reduced ability to tolerate prolonged functional positions   [x]Reduced ability or difficulty with changes of positions or transfers between positions   [x]Reduced ability to maintain good posture and demonstrate good body mechanics with sitting, bending, and lifting   [x]Reduced ability to sleep   [x] Reduced ability or tolerance with driving and/or computer work   [x]Reduced ability to perform lifting, carrying tasks   [x]Reduced ability to squat   [x]Reduced ability to forward bend   [x]Reduced ability to ambulate prolonged functional periods/distances/surfaces   [x]Reduced ability to ascend/descend stairs   [x]Reduced ability to run, hop, cut or jump   []other:    Participation Restrictions   []Reduced participation in self care activities   [x]Reduced participation in home management activities   [x]Reduced participation in work activities   []Reduced participation in social activities. []Reduced participation in sport/recreation activities. Classification :    [x]Signs/symptoms consistent with post-surgical status including decreased ROM, strength and function.    []Signs/symptoms consistent with joint sprain/strain   []Signs/symptoms consistent with patella-femoral syndrome   []Signs/symptoms consistent with knee OA/hip OA   []Signs/symptoms consistent with internal derangement of knee/Hip   []Signs/symptoms consistent with functional hip weakness/NMR control      []Signs/symptoms consistent with tendinitis/tendinosis    []signs/symptoms consistent with pathology which may benefit from Dry needling      []other:      Prognosis/Rehab Potential:      []Excellent   [x]Good    []Fair   []Poor    Tolerance of evaluation/treatment:    []Excellent   [x]Good    []Fair   []Poor    Physical Therapy Evaluation Complexity Justification  [x] A history of present problem with:  [] no personal factors and/or comorbidities that impact the plan of care;  [x]1-2 personal factors and/or comorbidities that impact the plan of care  []3 personal factors and/or comorbidities that impact the plan of care  [x] An examination of body systems using standardized tests and measures addressing any of the following: body structures and functions (impairments), activity limitations, and/or participation restrictions;:  [] a total of 1-2 or more elements   [] a total of 3 or more elements   [x] a total of 4 or more elements   [x] A clinical presentation with:  [x] stable and/or uncomplicated characteristics   [] evolving clinical presentation with changing characteristics  [] unstable and unpredictable characteristics;   [x] Clinical decision making of [x] low, [] moderate, [] high complexity using standardized patient assessment instrument and/or measurable assessment of functional outcome. [x] EVAL (LOW) 45837 (typically 20 minutes face-to-face)  [] EVAL (MOD) 69300 (typically 30 minutes face-to-face)  [] EVAL (HIGH) 31639 (typically 45 minutes face-to-face)  [] RE-EVAL     PLAN:   Frequency/Duration:  1-2 days per week for 12 Weeks:  Interventions:  [x]  Therapeutic exercise including: strength training, ROM, for Lower extremity and core   [x]  NMR activation and proprioception for LE, Glutes and Core   [x]  Manual therapy as indicated for LE, Hip and spine to include: Dry Needling/IASTM, STM, PROM, Gr I-IV mobilizations, manipulation. [x] Modalities as needed that may include: thermal agents, E-stim, Biofeedback, US, iontophoresis as indicated  [x] Patient education on joint protection, postural re-education, activity modification, progression of HEP. HEP instruction: Refer to [address] [place] [address] access code and exercises on the 1st visit treatment note    GOALS:  Patient stated goal: Walking without pain. Therapist goals for Patient:   Short Term Goals: To be achieved in: 2 weeks  1. Independent in HEP and progression per patient tolerance, in order to prevent re-injury. [] Progressing: [] Met: [] Not Met: [] Adjusted     2. Patient will have a decrease in pain to facilitate improvement in movement, function, and ADLs as indicated by Functional Deficits. [] Progressing: [] Met: [] Not Met: [] Adjusted     Long Term Goals: To be achieved in: 12 weeks  1. Disability index score of 50/80 or better for the LEFS to assist with reaching prior level of function. [] Progressing: [] Met: [] Not Met: [] Adjusted     2. Patient will demonstrate increased AROM to 0-120 to allow for proper joint functioning as indicated by patients Functional Deficits.    [] Progressing:

## 2022-03-30 NOTE — FLOWSHEET NOTE
[address] [facility], [address] [place] [address], [address] [place] [address] [address]  Phone: [phone]   Fax:     [phone]      Physical Therapy Treatment Note/ Progress Report:     Date:  3/30/2022    Patient Name:  Freda Broussard    :  1955  MRN: [id number]  Restrictions/Precautions:    Medical/Treatment Diagnosis Information:  · Diagnosis: s/p L TKA Q09.065 sx 3/28/22  · Treatment Diagnosis: Left knee pain M25. 818  Insurance/Certification information:  PT Insurance Information: Rich[employer]  Physician Information:  Referring Practitioner: Dr. Fransisco West  Has the plan of care been signed (Y/N):        []  Yes  [x]  No     Date of Patient follow up with Physician: 22    Is this a Progress Report:     []  Yes  [x]  No      If Yes:  Date Range for reporting period:  Beginning: 3/30/22 ------------ Endin22    Progress report will be due (10 Rx or 30 days whichever is less):       Recertification will be due (POC Duration  / 90 days whichever is less): 22        Visit # Insurance Allowable Auth Required   In Person 1 30 (1 used) []  Yes     []  No    Tele Health 0  []  Yes     []  No    Total 1       Functional Scale: LEFS 21.5/80   Date assessed:  3/30/22     Latex Allergy:  [x]NO      []YES  Preferred Language for Healthcare:   [x]English       []other:    Pain level: 4-6/10     SUBJECTIVE:  See eval    OBJECTIVE: See eval   Observation:    Test measurements:      RESTRICTIONS/PRECAUTIONS:  s/p L TKA 3/28/22; hx R RUBY  Spoke with MA can change dressing at  appt    Exercises/Interventions:   Therapeutic Ex (46020) Sets/sec Reps Notes/CUES HEP   Heel prop  2 min     Calf stretch belt 30s 3     HS stretch long 30s 3     Quad sets 10s 10     Heel slides   PT assist    Seated knee flex off EOB with use off opposite extremity for overpressure  5                       4 week orthovitals due                   Pt education 10 min Reviewed precautions, HEP with gradual progression, goals of PT, importance of early compliance with HEP for ROM, RICE principles- pt stated understanding    Manual Intervention ([id number])                                                 NMR re-education (87646)   CUES NEEDED                                                                   Therapeutic Activity (24162)                                          [employer] access code:  [email or web address]           Therapeutic Exercise and NMR EXR  [x] (26716) Provided verbal/tactile cueing for activities related to strengthening, flexibility, endurance, ROM for improvements in LE, proximal hip, and core control with self care, mobility, lifting, ambulation. [x] (09542) Provided verbal/tactile cueing for activities related to improving balance, coordination, kinesthetic sense, posture, motor skill, proprioception to assist with LE, proximal hip, and core control in self-care, mobility, lifting, ambulation and eccentric single leg control.      NMR and Therapeutic Activities:    [x] (72734 or 64509) Provided verbal/tactile cueing for activities related to improving balance, coordination, kinesthetic sense, posture, motor skill, proprioception and motor activation to allow for proper function of core, proximal hip and LE with self-care and ADLs and functional mobility.   [] (07229) Gait Re-education- Provided training and instruction to the patient for proper LE, core and proximal hip recruitment and positioning and eccentric body weight control with ambulation re-education including up and down stairs     Home Exercise Program:    [x] (30362) Reviewed/Progressed HEP activities related to strengthening, flexibility, endurance, ROM of core, proximal hip and LE for functional self-care, mobility, lifting and ambulation/stair navigation   [] (08714) Reviewed/Progressed HEP activities related to improving balance, coordination, kinesthetic sense, posture, motor skill, proprioception of core, proximal hip and LE for self-care, mobility, lifting, and ambulation/stair navigation      Manual Treatments:  PROM / STM / Oscillations-Mobs:  G-I, II, III, IV (PA's, Inf., Post.)  [x] (89420) Provided manual therapy to mobilize LE, proximal hip and/or LS spine soft tissue/joints for the purpose of modulating pain, promoting relaxation, increasing ROM, reducing/eliminating soft tissue swelling/inflammation/restriction, improving soft tissue extensibility and allowing for proper ROM for normal function with self-care, mobility, lifting and ambulation. Modalities:     [x] GAME READY (VASO)- for significant edema, swelling, pain control. Charges:  Timed Code Treatment Minutes: 25   Total Treatment Minutes:  45   BWC:  TE TIME:  NMR TIME:  MANUAL TIME:  UNTIMED MINUTES:  Medicare Total:         [x] EVAL (LOW) 95993 (typically 20 minutes face-to-face)  [] EVAL (MOD) 43541 (typically 30 minutes face-to-face)  [] EVAL (HIGH) 11005 (typically 45 minutes face-to-face)  [] RE-EVAL     [x] UR(14494) x   2  [] IONTO  [] NMR (75206) x     [x] VASO  [] Manual (96020) x     [] Other:  [] TA x      [] Mech Traction (64460)  [] ES(attended) (74506)      [] ES (un) (64385):    ASSESSMENT:  See eval    GOALS:    Patient stated goal: Walking without pain. Therapist goals for Patient:   Short Term Goals: To be achieved in: 2 weeks  1. Independent in HEP and progression per patient tolerance, in order to prevent re-injury. [] Progressing: [] Met: [] Not Met: [] Adjusted     2. Patient will have a decrease in pain to facilitate improvement in movement, function, and ADLs as indicated by Functional Deficits. [] Progressing: [] Met: [] Not Met: [] Adjusted     Long Term Goals: To be achieved in: 12 weeks  1. Disability index score of 50/80 or better for the LEFS to assist with reaching prior level of function. [] Progressing: [] Met: [] Not Met: [] Adjusted     2.  Patient will demonstrate increased AROM to 0-120 to allow for proper joint functioning as indicated by patients Functional Deficits. [] Progressing: [] Met: [] Not Met: [] Adjusted     3. Patient will demonstrate an increase in Strength to good proximal hip strength and control, within 5lb HHD in LE to allow for proper functional mobility as indicated by patients Functional Deficits. [] Progressing: [] Met: [] Not Met: [] Adjusted     4. Patient will return to functional activities including standing/walking 20-30 mins I no AD without increased symptoms or restriction. [] Progressing: [] Met: [] Not Met: [] Adjusted     5. Patient will ascend/descend 1 flight of steps I no AD (patient specific functional goal)    [] Progressing: [] Met: [] Not Met: [] Adjusted         Overall Progression Towards Functional goals/ Treatment Progress Update:  [] Patient is progressing as expected towards functional goals listed. [] Progression is slowed due to complexities/Impairments listed. [] Progression has been slowed due to co-morbidities.   [x] Plan just implemented, too soon to assess goals progression <30days   [] Goals require adjustment due to lack of progress  [] Patient is not progressing as expected and requires additional follow up with physician  [] Other    Prognosis for POC: [x] Good [] Fair  [] Poor      Patient requires continued skilled intervention: [x] Yes  [] No    Treatment/Activity Tolerance:  [x] Patient able to complete treatment  [] Patient limited by fatigue  [] Patient limited by pain    [] Patient limited by other medical complications  [] Other:     Return to Play: (if applicable)   []  Stage 1: Intro to Strength   []  Stage 2: Return to Run and Strength   []  Stage 3: Return to Jump and Strength   []  Stage 4: Dynamic Strength and Agility   []  Stage 5: Sport Specific Training     []  Ready to Return to Play, Meets All Above Stages   []  Not Ready for Return to Sports   Comments:                          PLAN: See eval  [] Continue per plan of care [] Alter current plan (see comments above)  [x] Plan of care initiated [] Hold pending MD visit [] Discharge    Electronically signed by:  Shakira Louis PT    Note: If patient does not return for scheduled/ recommended follow up visits, this note will serve as a discharge from care along with most recent update on progress.

## 2022-04-01 ENCOUNTER — HOSPITAL ENCOUNTER (OUTPATIENT)
Dept: PHYSICAL THERAPY | Age: 67
Setting detail: THERAPIES SERIES
Discharge: HOME | End: 2022-04-01
Payer: COMMERCIAL

## 2022-04-01 PROCEDURE — 97110 THERAPEUTIC EXERCISES: CPT | Performed by: SPECIALIST/TECHNOLOGIST

## 2022-04-01 PROCEDURE — 97112 NEUROMUSCULAR REEDUCATION: CPT | Performed by: SPECIALIST/TECHNOLOGIST

## 2022-04-01 PROCEDURE — 97016 VASOPNEUMATIC DEVICE THERAPY: CPT | Performed by: SPECIALIST/TECHNOLOGIST

## 2022-04-01 NOTE — TELEPHONE ENCOUNTER
Attempted to contact pt, left voicemail with purpose and call back number. Justinmon Subhash  Orthopedic Nurse Navigator  Phone number: [phone]    Follow up appointments:    Future Appointments   Date Time Provider Nabila Emmanuel   4/4/2022 10:45 AM Sidonie Cozier, PT [facility] [place] [facility]   4/6/2022 10:45 AM Sidonie Cozier, PT [facility] [place] [facility]   4/11/2022 10:45 AM Sidonie Cozier, PT [facility] [place] [facility]   4/13/2022 10:00 AM Sidonie Cozier, PT [facility] [place] [facility]   4/14/2022  9:45 AM Zulay Medina MD [facility]   4/18/2022 10:00 AM Sidonie Cozier, PT [facility] [place] [facility]   4/20/2022 10:45 AM Misti Jose, PTA [facility] [place] [facility]   4/25/2022 10:00 AM Sidonie Cozier, PT [facility] [place] [facility]   4/27/2022 10:00 AM Sidonie Cozier, PT [facility] [place] [facility]     Signed off from pt. Instructed pt to call RN or Surgeon's office with any issues or concerns.

## 2022-04-01 NOTE — FLOWSHEET NOTE
[facility] and [facility], [address] [place] [address], [address] [place] [address] [address]  Phone: [phone]   Fax:     [phone]      Physical Therapy Treatment Note/ Progress Report:     Date:  2022    Patient Name:  Rosario Maguire    :  1955  MRN: [id number]  Restrictions/Precautions:    Medical/Treatment Diagnosis Information:  · Diagnosis: s/p L TKA R31.420 sx 3/28/22  · Treatment Diagnosis: Left knee pain M25. 726  Insurance/Certification information:  PT Insurance Information: Rich[employer]  Physician Information:  Referring Practitioner: Dr. Seymour Dangelo  Has the plan of care been signed (Y/N):        []  Yes  [x]  No     Date of Patient follow up with Physician: 22    Is this a Progress Report:     []  Yes  [x]  No      If Yes:  Date Range for reporting period:  Beginning: 3/30/22 ------------ Endin22    Progress report will be due (10 Rx or 30 days whichever is less): 94      Recertification will be due (POC Duration  / 90 days whichever is less): 22        Visit # Insurance Allowable Auth Required   In Person 2 30 (1 used) []  Yes     []  No    Tele Health 0  []  Yes     []  No    Total 2       Functional Scale: LEFS 21.5/80   Date assessed:  3/30/22     Latex Allergy:  [x]NO      []YES  Preferred Language for Healthcare:   [x]English       []other:    Pain level: 4-6/10     SUBJECTIVE:  Pt notes he feels ok today. Notes he has been working hard at home. OBJECTIVE: 100 ° of flexion   Observation:    Test measurements:      RESTRICTIONS/PRECAUTIONS:  s/p L TKA 3/28/22; hx R RUBY  Spoke with MA can change dressing at  appt    [9:28 AM] Bert Dense  Eliane Torrez,    [9:29 AM] Bert Dense  Patient today of Dr. Seymour Dangelo that does not see the MD until . He has a pain pump.   Does the pain pump stay in until he follows up with MD?    [9:30 AM] Selene Montez, patient is to pull the pump once empty     [9:31 AM] Bert Dense  ok thanks           Exercises/Interventions:   Therapeutic Ex (61810) Sets/sec Reps Notes/CUES HEP   Heel prop  2 min     Calf stretch belt 30s 5     HS stretch long 30s 5     Quad sets 10s 10     Heel slides   PT assist    Seated knee flex off EOB with use off opposite extremity for overpressure 10\" 10     SLR  ABD 3  3 10  10                4 week orthovitals due 4/25                  Pt education 10 min  Reviewed precautions, HEP with gradual progression, goals of PT, importance of early compliance with HEP for ROM, RICE principles- pt stated understanding    Manual Intervention ([id number])                                                 NMR re-education (62929)   CUES NEEDED                                                                   Therapeutic Activity (91158)                     VASO  10'                   [facility] access code:  [email or web address]           Therapeutic Exercise and NMR EXR  [x] (60884) Provided verbal/tactile cueing for activities related to strengthening, flexibility, endurance, ROM for improvements in LE, proximal hip, and core control with self care, mobility, lifting, ambulation. [x] (38082) Provided verbal/tactile cueing for activities related to improving balance, coordination, kinesthetic sense, posture, motor skill, proprioception to assist with LE, proximal hip, and core control in self-care, mobility, lifting, ambulation and eccentric single leg control.      NMR and Therapeutic Activities:    [x] (22038 or 63424) Provided verbal/tactile cueing for activities related to improving balance, coordination, kinesthetic sense, posture, motor skill, proprioception and motor activation to allow for proper function of core, proximal hip and LE with self-care and ADLs and functional mobility.   [] (55162) Gait Re-education- Provided training and instruction to the patient for proper LE, core and proximal hip recruitment and positioning and eccentric body weight control with ambulation re-education including up and down stairs     Home Exercise Program:    [x] (01779) Reviewed/Progressed HEP activities related to strengthening, flexibility, endurance, ROM of core, proximal hip and LE for functional self-care, mobility, lifting and ambulation/stair navigation   [] (80715) Reviewed/Progressed HEP activities related to improving balance, coordination, kinesthetic sense, posture, motor skill, proprioception of core, proximal hip and LE for self-care, mobility, lifting, and ambulation/stair navigation      Manual Treatments:  PROM / STM / Oscillations-Mobs:  G-I, II, III, IV (PA's, Inf., Post.)  [x] (06164) Provided manual therapy to mobilize LE, proximal hip and/or LS spine soft tissue/joints for the purpose of modulating pain, promoting relaxation, increasing ROM, reducing/eliminating soft tissue swelling/inflammation/restriction, improving soft tissue extensibility and allowing for proper ROM for normal function with self-care, mobility, lifting and ambulation. Modalities:     [x] GAME READY (VASO)- for significant edema, swelling, pain control. Charges:  Timed Code Treatment Minutes: 50   Total Treatment Minutes:  60   BWC:  TE TIME:  NMR TIME:  MANUAL TIME:  UNTIMED MINUTES:  Medicare Total:         [] EVAL (LOW) 53360 (typically 20 minutes face-to-face)  [] EVAL (MOD) 63428 (typically 30 minutes face-to-face)  [] EVAL (HIGH) 67787 (typically 45 minutes face-to-face)  [] RE-EVAL     [x] FM(31031) x   2  [] IONTO  [x] NMR (59154) x  1   [x] VASO  [] Manual (77139) x     [] Other:  [] TA x      [] [facility] (27421)  [] ES(attended) (77251)      [] ES (un) (64534):    ASSESSMENT:  Put Massimo hose on surgery leg. GOALS:    Patient stated goal: Walking without pain. Therapist goals for Patient:   Short Term Goals: To be achieved in: 2 weeks  1. Independent in HEP and progression per patient tolerance, in order to prevent re-injury. [] Progressing: [] Met: [] Not Met: [] Adjusted     2.  Patient will have a decrease in pain to facilitate improvement in movement, function, and ADLs as indicated by Functional Deficits. [] Progressing: [] Met: [] Not Met: [] Adjusted     Long Term Goals: To be achieved in: 12 weeks  1. Disability index score of 50/80 or better for the LEFS to assist with reaching prior level of function. [] Progressing: [] Met: [] Not Met: [] Adjusted     2. Patient will demonstrate increased AROM to 0-120 to allow for proper joint functioning as indicated by patients Functional Deficits. [] Progressing: [] Met: [] Not Met: [] Adjusted     3. Patient will demonstrate an increase in Strength to good proximal hip strength and control, within 5lb HHD in LE to allow for proper functional mobility as indicated by patients Functional Deficits. [] Progressing: [] Met: [] Not Met: [] Adjusted     4. Patient will return to functional activities including standing/walking 20-30 mins I no AD without increased symptoms or restriction. [] Progressing: [] Met: [] Not Met: [] Adjusted     5. Patient will ascend/descend 1 flight of steps I no AD (patient specific functional goal)    [] Progressing: [] Met: [] Not Met: [] Adjusted         Overall Progression Towards Functional goals/ Treatment Progress Update:  [] Patient is progressing as expected towards functional goals listed. [] Progression is slowed due to complexities/Impairments listed. [] Progression has been slowed due to co-morbidities.   [x] Plan just implemented, too soon to assess goals progression <30days   [] Goals require adjustment due to lack of progress  [] Patient is not progressing as expected and requires additional follow up with physician  [] Other    Prognosis for POC: [x] Good [] Fair  [] Poor      Patient requires continued skilled intervention: [x] Yes  [] No    Treatment/Activity Tolerance:  [x] Patient able to complete treatment  [] Patient limited by fatigue  [] Patient limited by pain    [] Patient limited by other medical complications  [] Other:     Return to Play: (if applicable)   []  Stage 1: Intro to Strength   []  Stage 2: Return to Run and Strength   []  Stage 3: Return to Jump and Strength   []  Stage 4: Dynamic Strength and Agility   []  Stage 5: Sport Specific Training     []  Ready to Return to Play, Meets All Above Stages   []  Not Ready for Return to Sports   Comments:                          PLAN:   [x] Continue per plan of care [] Alter current plan (see comments above)  [] Plan of care initiated [] Hold pending MD visit [] Discharge    Electronically signed by:  Maykel Bucio PTA, ATC    Note: If patient does not return for scheduled/ recommended follow up visits, this note will serve as a discharge from care along with most recent update on progress.

## 2022-04-04 ENCOUNTER — HOSPITAL ENCOUNTER (OUTPATIENT)
Dept: PHYSICAL THERAPY | Age: 67
Setting detail: THERAPIES SERIES
Discharge: HOME | End: 2022-04-04
Payer: COMMERCIAL

## 2022-04-04 PROCEDURE — 97110 THERAPEUTIC EXERCISES: CPT

## 2022-04-04 PROCEDURE — 97016 VASOPNEUMATIC DEVICE THERAPY: CPT

## 2022-04-04 PROCEDURE — 97112 NEUROMUSCULAR REEDUCATION: CPT

## 2022-04-04 NOTE — DISCHARGE SUMMARY
Department of Orthopedic Surgery  Physician Assistant   Discharge Summary    The Freda Broussard is a 77 y.o. male admitted for left knee osteoarthritis. Freda Broussard was admitted to the floor following His recovery in the PACU. Discharge Diagnosis  Left total knee Jose David Almanzar    Current Inpatient Medications    No current facility-administered medications for this encounter. Post-operatively the patients diet was advanced as tolerated and their dressing was changed on POD #1. The incision is dressing in place, clean, dry and intact with no signs of infection. The patient remained neurovascularly intact in the left lower and had intact pulses distally. Patients calf remained soft and showed no evidence of DVT. The patient was able to move their left lower extremity without any problems post-operatively. Physical therapy and occupational therapy were consulted and began working with the patient post-operatively. The patient progressed with PT/OT as would be expected and continued to improve through their stay. The patients pain was initially controlled with IV medications but we were able to transition to oral pain medications soon after arrival to the floor and their pain remained under good control through their hospital stay. From a medical standpoint the patient remained stable and continued to have the medicine team follow throughout their stay. The patient will be discharged at this time to Home  with their current diet restrictions and will continue to follow the precautions outlined to them by us and PT/OT. Condition on Discharge: Stable    Plan  Return visit in 2 weeks. .  Patient was instructed on the use of pain medications, the signs and symptoms of infection, and was given our number to call should they have any questions or concerns following discharge. For opioid prescriptions given at discharge the following statement is provided for compliance with [employer] rules.  Patient being given increased dosage/quantity of opoid pain medication in excess of [employer] guidelines which noted a 30 MED daily of opioids due to the fact that he/she has undergone major orthopaedic surgery as outlined in rule 4731-11-13. Dosages and further duration of the pain medication will be re-evaluated at her post op visit in 2 weeks. Patient was educated on dosing expectations and limits of prescribing as a result of the new [facility] Board rules enacted August 31, 2017. Please also note that this is not the initial opoid prescription issued to this patient but a continuation of medication utilized during the hospital admission as noted in the medical record. [place] report has also been utilized to screen for any abuse history or suspicious activity as outlined in [place]. All efforts have been taken to prevent abuse potential and misuse of opioid medications including education, screening, and close clinical follow up.

## 2022-04-04 NOTE — FLOWSHEET NOTE
[address] [place] [address] and [facility], [address] [place] [address] [address]  Phone: [phone]   Fax:     [phone]      Physical Therapy Treatment Note/ Progress Report:     Date:  2022    Patient Name:  Sydney Ponce    :  1955  MRN: [id number]  Restrictions/Precautions:    Medical/Treatment Diagnosis Information:  · Diagnosis: s/p L TKA I32.602 sx 3/28/22  · Treatment Diagnosis: Left knee pain M25. 967  Insurance/Certification information:  PT Insurance Information: Richa  Physician Information:  Referring Practitioner: Dr. Dustin Ellis  Has the plan of care been signed (Y/N):        []  Yes  [x]  No     Date of Patient follow up with Physician: 22    Is this a Progress Report:     []  Yes  [x]  No      If Yes:  Date Range for reporting period:  Beginning: 3/30/22 ------------ Endin22    Progress report will be due (10 Rx or 30 days whichever is less):       Recertification will be due (POC Duration  / 90 days whichever is less): 22        Visit # Insurance Allowable Auth Required   In Person 3 30 (1 used) []  Yes     []  No    Tele Health 0  []  Yes     []  No    Total 3       Functional Scale: LEFS 21.5/80   Date assessed:  3/30/22     Latex Allergy:  [x]NO      []YES  Preferred Language for Healthcare:   [x]English       []other:    Pain level: 4-6/10     SUBJECTIVE:  Pt stated feels stiff today, had best night of sleep last night he has had. OBJECTIVE:  Lacking 7 fr 0 - 105 ° of flexion   Observation:    Test measurements:      RESTRICTIONS/PRECAUTIONS:  s/p L TKA 3/28/22; hx R RUBY  Spoke with MA can change dressing at  appt    [9:28 AM] Ardie Patches  Lalitha Garcia,    [9:29 AM] Ardie Patches  Patient today of Dr. Dustin Ellis that does not see the MD until . He has a pain pump.   Does the pain pump stay in until he follows up with MD?    [9:30 AM] Ruchi Montez More  Nope, patient is to pull the pump once empty [9:31 AM] Nam Pop  ok thanks           Exercises/Interventions:   Therapeutic Ex (44951) Sets/sec Reps Notes/CUES HEP   Bike ROM  Heel prop 6 min  5#   3 min     Calf stretch belt 30s 5     HS stretch long 30s 5     Quad sets 10s 10     Heel slides   PT assist    Seated knee flex off EOB with use off opposite extremity for overpressure 10\" 10     SLR  ABD 3  3 10  10     TKE  NV         4 week orthovitals due 4/25                  Pt education 10 min  Reviewed precautions, HEP with gradual progression, goals of PT, importance of early compliance with HEP for ROM, RICE principles- pt stated understanding    Manual Intervention ([address] [facility])       Sup/inf patella mobs, AP jt mobs 8 min                                         NMR re-education (05357)   CUES NEEDED                                                                   Therapeutic Activity (01319)                     VASO  10'                   [facility] access code:  [email or web address]           Therapeutic Exercise and NMR EXR  [x] (45108) Provided verbal/tactile cueing for activities related to strengthening, flexibility, endurance, ROM for improvements in LE, proximal hip, and core control with self care, mobility, lifting, ambulation. [x] (43393) Provided verbal/tactile cueing for activities related to improving balance, coordination, kinesthetic sense, posture, motor skill, proprioception to assist with LE, proximal hip, and core control in self-care, mobility, lifting, ambulation and eccentric single leg control.      NMR and Therapeutic Activities:    [x] (81219 or 20298) Provided verbal/tactile cueing for activities related to improving balance, coordination, kinesthetic sense, posture, motor skill, proprioception and motor activation to allow for proper function of core, proximal hip and LE with self-care and ADLs and functional mobility.   [] (66735) Gait Re-education- Provided training and instruction to the patient for proper LE, core and proximal hip recruitment and positioning and eccentric body weight control with ambulation re-education including up and down stairs     Home Exercise Program:    [x] (27252) Reviewed/Progressed HEP activities related to strengthening, flexibility, endurance, ROM of core, proximal hip and LE for functional self-care, mobility, lifting and ambulation/stair navigation   [] (08319) Reviewed/Progressed HEP activities related to improving balance, coordination, kinesthetic sense, posture, motor skill, proprioception of core, proximal hip and LE for self-care, mobility, lifting, and ambulation/stair navigation      Manual Treatments:  PROM / STM / Oscillations-Mobs:  G-I, II, III, IV (PA's, Inf., Post.)  [x] (20170) Provided manual therapy to mobilize LE, proximal hip and/or LS spine soft tissue/joints for the purpose of modulating pain, promoting relaxation, increasing ROM, reducing/eliminating soft tissue swelling/inflammation/restriction, improving soft tissue extensibility and allowing for proper ROM for normal function with self-care, mobility, lifting and ambulation. Modalities:     [x] GAME READY (VASO)- for significant edema, swelling, pain control. Charges:  Timed Code Treatment Minutes: 45   Total Treatment Minutes:  55   BWC:  TE TIME:  NMR TIME:  MANUAL TIME:  UNTIMED MINUTES:  Medicare Total:         [] EVAL (LOW) 20987 (typically 20 minutes face-to-face)  [] EVAL (MOD) 82185 (typically 30 minutes face-to-face)  [] EVAL (HIGH) 34204 (typically 45 minutes face-to-face)  [] RE-EVAL     [x] AY(55286) x   2  [] IONTO  [x] NMR (48260) x  1   [x] VASO  [] Manual (82639) x     [] Other:  [] TA x      [] Mech Traction (96779)  [] ES(attended) (94186)      [] ES (un) (08536):    ASSESSMENT:  ROM gradually increasing. No complaints at conclusion. Educated to continue focus on ROM. GOALS:    Patient stated goal: Walking without pain. Therapist goals for Patient:   Short Term Goals: To be achieved in: 2 weeks  1. Independent in HEP and progression per patient tolerance, in order to prevent re-injury. [] Progressing: [] Met: [] Not Met: [] Adjusted     2. Patient will have a decrease in pain to facilitate improvement in movement, function, and ADLs as indicated by Functional Deficits. [] Progressing: [] Met: [] Not Met: [] Adjusted     Long Term Goals: To be achieved in: 12 weeks  1. Disability index score of 50/80 or better for the LEFS to assist with reaching prior level of function. [] Progressing: [] Met: [] Not Met: [] Adjusted     2. Patient will demonstrate increased AROM to 0-120 to allow for proper joint functioning as indicated by patients Functional Deficits. [] Progressing: [] Met: [] Not Met: [] Adjusted     3. Patient will demonstrate an increase in Strength to good proximal hip strength and control, within 5lb HHD in LE to allow for proper functional mobility as indicated by patients Functional Deficits. [] Progressing: [] Met: [] Not Met: [] Adjusted     4. Patient will return to functional activities including standing/walking 20-30 mins I no AD without increased symptoms or restriction. [] Progressing: [] Met: [] Not Met: [] Adjusted     5. Patient will ascend/descend 1 flight of steps I no AD (patient specific functional goal)    [] Progressing: [] Met: [] Not Met: [] Adjusted         Overall Progression Towards Functional goals/ Treatment Progress Update:  [] Patient is progressing as expected towards functional goals listed. [] Progression is slowed due to complexities/Impairments listed. [] Progression has been slowed due to co-morbidities.   [x] Plan just implemented, too soon to assess goals progression <30days   [] Goals require adjustment due to lack of progress  [] Patient is not progressing as expected and requires additional follow up with physician  [] Other    Prognosis for POC: [x] Good [] Fair  [] Poor      Patient requires continued skilled intervention: [x] Yes  [] No    Treatment/Activity Tolerance:  [x] Patient able to complete treatment  [] Patient limited by fatigue  [] Patient limited by pain    [] Patient limited by other medical complications  [] Other:     Return to Play: (if applicable)   []  Stage 1: Intro to Strength   []  Stage 2: Return to Run and Strength   []  Stage 3: Return to Jump and Strength   []  Stage 4: Dynamic Strength and Agility   []  Stage 5: Sport Specific Training     []  Ready to Return to Play, Meets All Above Stages   []  Not Ready for Return to Sports   Comments:                          PLAN:   [x] Continue per plan of care [] Alter current plan (see comments above)  [] Plan of care initiated [] Hold pending MD visit [] Discharge    Electronically signed by:  Jaylyn Fu PT, DPT    Note: If patient does not return for scheduled/ recommended follow up visits, this note will serve as a discharge from care along with most recent update on progress.

## 2022-04-06 ENCOUNTER — HOSPITAL ENCOUNTER (OUTPATIENT)
Dept: PHYSICAL THERAPY | Age: 67
Setting detail: THERAPIES SERIES
Discharge: HOME | End: 2022-04-06
Payer: COMMERCIAL

## 2022-04-06 PROCEDURE — 97016 VASOPNEUMATIC DEVICE THERAPY: CPT | Performed by: SPECIALIST/TECHNOLOGIST

## 2022-04-06 PROCEDURE — 97110 THERAPEUTIC EXERCISES: CPT | Performed by: SPECIALIST/TECHNOLOGIST

## 2022-04-06 PROCEDURE — 97112 NEUROMUSCULAR REEDUCATION: CPT | Performed by: SPECIALIST/TECHNOLOGIST

## 2022-04-06 PROCEDURE — 97140 MANUAL THERAPY 1/> REGIONS: CPT | Performed by: SPECIALIST/TECHNOLOGIST

## 2022-04-06 NOTE — FLOWSHEET NOTE
[address] [facility] and [facility], [address] [place] [address], [address] [place] [address] [address]  Phone: [phone]   Fax:     [phone]      Physical Therapy Treatment Note/ Progress Report:     Date:  2022    Patient Name:  Henrique Ramirez    :  1955  MRN: [id number]  Restrictions/Precautions:    Medical/Treatment Diagnosis Information:  · Diagnosis: s/p L TKA E95.614 sx 3/28/22  · Treatment Diagnosis: Left knee pain M25. 784  Insurance/Certification information:  PT Insurance Information: Rich[employer]  Physician Information:  Referring Practitioner: Dr. Maryjane Martinez  Has the plan of care been signed (Y/N):        []  Yes  [x]  No     Date of Patient follow up with Physician: 22    Is this a Progress Report:     []  Yes  [x]  No      If Yes:  Date Range for reporting period:  Beginning: 3/30/22 ------------ Endin22    Progress report will be due (10 Rx or 30 days whichever is less): 3/94/34      Recertification will be due (POC Duration  / 90 days whichever is less): 22        Visit # Insurance Allowable Auth Required   In Person  (1 used) []  Yes     []  No    Tele Health 0  []  Yes     []  No    Total 4       Functional Scale: LEFS 21.5/80   Date assessed:  3/30/22     Latex Allergy:  [x]NO      []YES  Preferred Language for Healthcare:   [x]English       []other:    Pain level: 4-6/10     SUBJECTIVE:  Pt notes his knee feels stiff today. OBJECTIVE:  0 - 115 ° of flexion   Observation:    Test measurements:      RESTRICTIONS/PRECAUTIONS:  s/p L TKA 3/28/22; hx R RUBY  Spoke with MA can change dressing at  appt    [9:28 AM] Shivani Guadarrama,    [9:29 AM] Shivani Enamorado  Patient today of Dr. Maryjane Martinez that does not see the MD until . He has a pain pump.   Does the pain pump stay in until he follows up with MD?    [9:30 AM] Americo Montez, patient is to pull the pump once empty     [9:31 AM] Shivani singh Exercises/Interventions:   Therapeutic Ex (50833) Sets/sec Reps Notes/CUES HEP   Bike ROM  Heel prop 8 min  5#   3 min ROM    Calf stretch belt 30s 5     HS stretch long 30s 5     Quad sets 10s 10     Heel slides   PT assist    Seated knee flex off EOB with use off opposite extremity for overpressure 10\" 10     SLR  ABD 3  3 10  10     TKE  BS  Bridges  Calf raises 3\"  10\" 30x  10x  20x  30x Blue TB    3\" holds        4 week orthovitals due 4/25                  Pt education 10 min  Reviewed precautions, HEP with gradual progression, goals of PT, importance of early compliance with HEP for ROM, RICE principles- pt stated understanding    Manual Intervention (21090)       Sup/inf patella mobs, AP jt mobs 8 min                                         NMR re-education (11564)   CUES NEEDED                                                                   Therapeutic Activity (91511)                     VASO  10'                   [facility] access code:  [email or web address]           Therapeutic Exercise and NMR EXR  [x] (20310) Provided verbal/tactile cueing for activities related to strengthening, flexibility, endurance, ROM for improvements in LE, proximal hip, and core control with self care, mobility, lifting, ambulation. [x] (18110) Provided verbal/tactile cueing for activities related to improving balance, coordination, kinesthetic sense, posture, motor skill, proprioception to assist with LE, proximal hip, and core control in self-care, mobility, lifting, ambulation and eccentric single leg control.      NMR and Therapeutic Activities:    [x] (88165 or 85889) Provided verbal/tactile cueing for activities related to improving balance, coordination, kinesthetic sense, posture, motor skill, proprioception and motor activation to allow for proper function of core, proximal hip and LE with self-care and ADLs and functional mobility.   [] (44818) Gait Re-education- Provided training and instruction to the patient for proper LE, core and proximal hip recruitment and positioning and eccentric body weight control with ambulation re-education including up and down stairs     Home Exercise Program:    [x] (80763) Reviewed/Progressed HEP activities related to strengthening, flexibility, endurance, ROM of core, proximal hip and LE for functional self-care, mobility, lifting and ambulation/stair navigation   [] (29042) Reviewed/Progressed HEP activities related to improving balance, coordination, kinesthetic sense, posture, motor skill, proprioception of core, proximal hip and LE for self-care, mobility, lifting, and ambulation/stair navigation      Manual Treatments:  PROM / STM / Oscillations-Mobs:  G-I, II, III, IV (PA's, Inf., Post.)  [x] (25448) Provided manual therapy to mobilize LE, proximal hip and/or LS spine soft tissue/joints for the purpose of modulating pain, promoting relaxation, increasing ROM, reducing/eliminating soft tissue swelling/inflammation/restriction, improving soft tissue extensibility and allowing for proper ROM for normal function with self-care, mobility, lifting and ambulation. Modalities:     [x] GAME READY (VASO)- for significant edema, swelling, pain control. Charges:  Timed Code Treatment Minutes: 55   Total Treatment Minutes:  65   BWC:  TE TIME:  NMR TIME:  MANUAL TIME:  UNTIMED MINUTES:  Medicare Total:         [] EVAL (LOW) 59644 (typically 20 minutes face-to-face)  [] EVAL (MOD) 63326 (typically 30 minutes face-to-face)  [] EVAL (HIGH) 97140 (typically 45 minutes face-to-face)  [] RE-EVAL     [x] QH(59048) x   2  [] IONTO  [x] NMR (37281) x  1   [x] VASO  [x] Manual (03671) x    1 [] Other:  [] TA x      [] Mech Traction (84188)  [] ES(attended) (70919)      [] ES (un) (56613):    ASSESSMENT:  ROM gradually increasing. No complaints at conclusion. Educated to continue focus on ROM. GOALS:    Patient stated goal: Walking without pain. Therapist goals for Patient:   Short Term Goals:  To be achieved in: 2 weeks  1. Independent in HEP and progression per patient tolerance, in order to prevent re-injury. [] Progressing: [] Met: [] Not Met: [] Adjusted     2. Patient will have a decrease in pain to facilitate improvement in movement, function, and ADLs as indicated by Functional Deficits. [] Progressing: [] Met: [] Not Met: [] Adjusted     Long Term Goals: To be achieved in: 12 weeks  1. Disability index score of 50/80 or better for the LEFS to assist with reaching prior level of function. [] Progressing: [] Met: [] Not Met: [] Adjusted     2. Patient will demonstrate increased AROM to 0-120 to allow for proper joint functioning as indicated by patients Functional Deficits. [] Progressing: [] Met: [] Not Met: [] Adjusted     3. Patient will demonstrate an increase in Strength to good proximal hip strength and control, within 5lb HHD in LE to allow for proper functional mobility as indicated by patients Functional Deficits. [] Progressing: [] Met: [] Not Met: [] Adjusted     4. Patient will return to functional activities including standing/walking 20-30 mins I no AD without increased symptoms or restriction. [] Progressing: [] Met: [] Not Met: [] Adjusted     5. Patient will ascend/descend 1 flight of steps I no AD (patient specific functional goal)    [] Progressing: [] Met: [] Not Met: [] Adjusted         Overall Progression Towards Functional goals/ Treatment Progress Update:  [] Patient is progressing as expected towards functional goals listed. [] Progression is slowed due to complexities/Impairments listed. [] Progression has been slowed due to co-morbidities.   [x] Plan just implemented, too soon to assess goals progression <30days   [] Goals require adjustment due to lack of progress  [] Patient is not progressing as expected and requires additional follow up with physician  [] Other    Prognosis for POC: [x] Good [] Fair  [] Poor      Patient requires continued skilled intervention: [x] Yes  [] No    Treatment/Activity Tolerance:  [x] Patient able to complete treatment  [] Patient limited by fatigue  [] Patient limited by pain    [] Patient limited by other medical complications  [] Other:     Return to Play: (if applicable)   []  Stage 1: Intro to Strength   []  Stage 2: Return to Run and Strength   []  Stage 3: Return to Jump and Strength   []  Stage 4: Dynamic Strength and Agility   []  Stage 5: Sport Specific Training     []  Ready to Return to Play, Meets All Above Stages   []  Not Ready for Return to Sports   Comments:                          PLAN:   [x] Continue per plan of care [] Alter current plan (see comments above)  [] Plan of care initiated [] Hold pending MD visit [] Discharge    Electronically signed by:  Ephriam Lombard, PTA, ATC    Note: If patient does not return for scheduled/ recommended follow up visits, this note will serve as a discharge from care along with most recent update on progress.

## 2022-04-11 ENCOUNTER — APPOINTMENT (OUTPATIENT)
Dept: PHYSICAL THERAPY | Age: 67
End: 2022-04-11
Payer: COMMERCIAL

## 2022-04-11 ENCOUNTER — HOSPITAL ENCOUNTER (OUTPATIENT)
Dept: PHYSICAL THERAPY | Age: 67
Setting detail: THERAPIES SERIES
Discharge: HOME | End: 2022-04-11
Payer: COMMERCIAL

## 2022-04-11 PROCEDURE — 97140 MANUAL THERAPY 1/> REGIONS: CPT

## 2022-04-11 PROCEDURE — 97016 VASOPNEUMATIC DEVICE THERAPY: CPT

## 2022-04-11 PROCEDURE — 97112 NEUROMUSCULAR REEDUCATION: CPT

## 2022-04-11 PROCEDURE — 97110 THERAPEUTIC EXERCISES: CPT

## 2022-04-11 NOTE — FLOWSHEET NOTE
[facility] [facility] and 500 [facility], [address] [place] [address], [address] [place] [address] [address]  Phone: [phone]   Fax:     [phone]      Physical Therapy Treatment Note/ Progress Report:     Date:  2022    Patient Name:  Cuca Grey    :  1955  MRN: [id number]  Restrictions/Precautions:    Medical/Treatment Diagnosis Information:  · Diagnosis: s/p L TKA L77.658 sx 3/28/22  · Treatment Diagnosis: Left knee pain M25. 476  Insurance/Certification information:  PT Insurance Information: Richa  Physician Information:  Referring Practitioner: Dr. Selena Fields  Has the plan of care been signed (Y/N):        []  Yes  [x]  No     Date of Patient follow up with Physician: 22    Is this a Progress Report:     []  Yes  [x]  No      If Yes:  Date Range for reporting period:  Beginning: 3/30/22 ------------ Endin22    Progress report will be due (10 Rx or 30 days whichever is less): 3/75/30      Recertification will be due (POC Duration  / 90 days whichever is less): 22        Visit # Insurance Allowable Auth Required   In Person 5 30 (1 used) []  Yes     []  No    Tele Health 0  []  Yes     []  No    Total 5       Functional Scale: LEFS 21.5/80   Date assessed:  3/30/22     Latex Allergy:  [x]NO      []YES  Preferred Language for Healthcare:   [x]English       []other:    Pain level: 2/10     SUBJECTIVE:  Pt stated frustration over continuing to feel stiffness daily. OBJECTIVE:  0 - 125 ° of flexion (wall slide) 120 supine knee flexion   Observation:    Test measurements:      RESTRICTIONS/PRECAUTIONS:  s/p L TKA 3/28/22; hx R RUBY  Spoke with MA can change dressing at  appt    [9:28 AM] Pau Cedeno,    [9:29 AM] Pau Clements  Patient today of Dr. Selena Fields that does not see the MD until . He has a pain pump.   Does the pain pump stay in until he follows up with MD?    [9:30 AM] Marissa Montez, patient is to pull the pump once empty     [9:31 AM] Holly Pham  ok thanks           Exercises/Interventions:   Therapeutic Ex (35304) Sets/sec Reps Notes/CUES HEP   Bike ROM  Heel prop 8 min  5#   3 min ROM    Calf stretch belt 30s 5     HS stretch long 30s 5     Quad sets 10s 10     Heel slides   PT assist    Seated knee flex off EOB with use off opposite extremity for overpressure 10\" 10     SLR  ABD 1# 3  1# 1 10  30     TKE  BS  Bridges  Calf raises  LAQ 3\"  10\"      1# 30x  10x  20x  30x  30x Blue TB    3\" holds        4 week orthovitals due 4/25                  Pt education 10 min  Reviewed precautions, HEP with gradual progression, goals of PT, importance of early compliance with HEP for ROM, RICE principles- pt stated understanding    Manual Intervention ([id number])       Sup/inf patella mobs, AP jt mobs 8 min                                         NMR re-education (30193)   CUES NEEDED    NMR with quad sets 10 min                                                              Therapeutic Activity (80344)                     VASO  10'                   [facility] access code:  [email or web address]           Therapeutic Exercise and NMR EXR  [x] (31981) Provided verbal/tactile cueing for activities related to strengthening, flexibility, endurance, ROM for improvements in LE, proximal hip, and core control with self care, mobility, lifting, ambulation. [x] (00621) Provided verbal/tactile cueing for activities related to improving balance, coordination, kinesthetic sense, posture, motor skill, proprioception to assist with LE, proximal hip, and core control in self-care, mobility, lifting, ambulation and eccentric single leg control.      NMR and Therapeutic Activities:    [x] (09070 or 31956) Provided verbal/tactile cueing for activities related to improving balance, coordination, kinesthetic sense, posture, motor skill, proprioception and motor activation to allow for proper function of core, proximal hip and LE with self-care and ADLs and functional mobility.   [] (36499) Gait Re-education- Provided training and instruction to the patient for proper LE, core and proximal hip recruitment and positioning and eccentric body weight control with ambulation re-education including up and down stairs     Home Exercise Program:    [x] (98729) Reviewed/Progressed HEP activities related to strengthening, flexibility, endurance, ROM of core, proximal hip and LE for functional self-care, mobility, lifting and ambulation/stair navigation   [] (57361) Reviewed/Progressed HEP activities related to improving balance, coordination, kinesthetic sense, posture, motor skill, proprioception of core, proximal hip and LE for self-care, mobility, lifting, and ambulation/stair navigation      Manual Treatments:  PROM / STM / Oscillations-Mobs:  G-I, II, III, IV (PA's, Inf., Post.)  [x] (61592) Provided manual therapy to mobilize LE, proximal hip and/or LS spine soft tissue/joints for the purpose of modulating pain, promoting relaxation, increasing ROM, reducing/eliminating soft tissue swelling/inflammation/restriction, improving soft tissue extensibility and allowing for proper ROM for normal function with self-care, mobility, lifting and ambulation. Modalities:     [x] GAME READY (VASO)- for significant edema, swelling, pain control. Charges:  Timed Code Treatment Minutes: 55   Total Treatment Minutes:  65   BWC:  TE TIME:  NMR TIME:  MANUAL TIME:  UNTIMED MINUTES:  Medicare Total:         [] EVAL (LOW) 48026 (typically 20 minutes face-to-face)  [] EVAL (MOD) 39030 (typically 30 minutes face-to-face)  [] EVAL (HIGH) 11476 (typically 45 minutes face-to-face)  [] RE-EVAL     [x] MB(19078) x   2  [] IONTO  [x] NMR (26973) x  1   [x] VASO  [x] Manual (53217) x    1 [] Other:  [] TA x      [] Mech Traction (93053)  [] ES(attended) (50900)      [] ES (un) (59326):    ASSESSMENT:  ROM gradually increasing. No complaints at conclusion. Educated to continue focus on ROM.  Starting to add some strength to HEP with good understanding. GOALS:    Patient stated goal: Walking without pain. Therapist goals for Patient:   Short Term Goals: To be achieved in: 2 weeks  1. Independent in HEP and progression per patient tolerance, in order to prevent re-injury. [] Progressing: [] Met: [] Not Met: [] Adjusted     2. Patient will have a decrease in pain to facilitate improvement in movement, function, and ADLs as indicated by Functional Deficits. [] Progressing: [] Met: [] Not Met: [] Adjusted     Long Term Goals: To be achieved in: 12 weeks  1. Disability index score of 50/80 or better for the LEFS to assist with reaching prior level of function. [] Progressing: [] Met: [] Not Met: [] Adjusted     2. Patient will demonstrate increased AROM to 0-120 to allow for proper joint functioning as indicated by patients Functional Deficits. [] Progressing: [] Met: [] Not Met: [] Adjusted     3. Patient will demonstrate an increase in Strength to good proximal hip strength and control, within 5lb HHD in LE to allow for proper functional mobility as indicated by patients Functional Deficits. [] Progressing: [] Met: [] Not Met: [] Adjusted     4. Patient will return to functional activities including standing/walking 20-30 mins I no AD without increased symptoms or restriction. [] Progressing: [] Met: [] Not Met: [] Adjusted     5. Patient will ascend/descend 1 flight of steps I no AD (patient specific functional goal)    [] Progressing: [] Met: [] Not Met: [] Adjusted         Overall Progression Towards Functional goals/ Treatment Progress Update:  [] Patient is progressing as expected towards functional goals listed. [] Progression is slowed due to complexities/Impairments listed. [] Progression has been slowed due to co-morbidities.   [x] Plan just implemented, too soon to assess goals progression <30days   [] Goals require adjustment due to lack of progress  [] Patient is not progressing as expected and requires additional follow up with physician  [] Other    Prognosis for POC: [x] Good [] Fair  [] Poor      Patient requires continued skilled intervention: [x] Yes  [] No    Treatment/Activity Tolerance:  [x] Patient able to complete treatment  [] Patient limited by fatigue  [] Patient limited by pain    [] Patient limited by other medical complications  [] Other:     Return to Play: (if applicable)   []  Stage 1: Intro to Strength   []  Stage 2: Return to Run and Strength   []  Stage 3: Return to Jump and Strength   []  Stage 4: Dynamic Strength and Agility   []  Stage 5: Sport Specific Training     []  Ready to Return to Play, Meets All Above Stages   []  Not Ready for Return to Sports   Comments:                          PLAN:   [x] Continue per plan of care [] Alter current plan (see comments above)  [] Plan of care initiated [] Hold pending MD visit [] Discharge    Electronically signed by:  Madhuri Freeman PT, DPT    Note: If patient does not return for scheduled/ recommended follow up visits, this note will serve as a discharge from care along with most recent update on progress.

## 2022-04-13 ENCOUNTER — HOSPITAL ENCOUNTER (OUTPATIENT)
Dept: PHYSICAL THERAPY | Age: 67
Setting detail: THERAPIES SERIES
Discharge: HOME | End: 2022-04-13
Payer: COMMERCIAL

## 2022-04-13 PROCEDURE — 97112 NEUROMUSCULAR REEDUCATION: CPT

## 2022-04-13 PROCEDURE — 97110 THERAPEUTIC EXERCISES: CPT

## 2022-04-13 PROCEDURE — 97016 VASOPNEUMATIC DEVICE THERAPY: CPT

## 2022-04-13 PROCEDURE — 97140 MANUAL THERAPY 1/> REGIONS: CPT

## 2022-04-13 NOTE — FLOWSHEET NOTE
[address]23 [facility] and 500 [address], [address] [place] [address] [address]  Phone: [phone]   Fax:     [phone]      Physical Therapy Treatment Note/ Progress Report:     Date:  2022    Patient Name:  Miryam Zarate    :  1955  MRN: [id number]  Restrictions/Precautions:    Medical/Treatment Diagnosis Information:  · Diagnosis: s/p L TKA O38.277 sx 3/28/22  · Treatment Diagnosis: Left knee pain M25. 962  Insurance/Certification information:  PT Insurance Information: Richa  Physician Information:  Referring Practitioner: Dr. Ryanne Mcfadden  Has the plan of care been signed (Y/N):        []  Yes  [x]  No     Date of Patient follow up with Physician: 22    Is this a Progress Report:     []  Yes  [x]  No      If Yes:  Date Range for reporting period:  Beginning: 3/30/22 ------------ Endin22    Progress report will be due (10 Rx or 30 days whichever is less):       Recertification will be due (POC Duration  / 90 days whichever is less): 22        Visit # Insurance Allowable Auth Required   In Person  (1 used) []  Yes     []  No    Tele Health 0  []  Yes     []  No    Total 6       Functional Scale: LEFS 21.5/80   Date assessed:  3/30/22     Latex Allergy:  [x]NO      []YES  Preferred Language for Healthcare:   [x]English       []other:    Pain level: 2/10     SUBJECTIVE:  Pt stated feeling okay today, stated got foot peddle bike at home. OBJECTIVE:  - 5 to 0 - 125 ° of flexion (wall slide) 120 supine knee flexion   Observation:    Test measurements:      RESTRICTIONS/PRECAUTIONS:  s/p L TKA 3/28/22; hx R RUBY  Spoke with MA can change dressing at  appt    [9:28 AM] Karren Juba Darylene Basta,    [9:29 AM] Lili Lewis  Patient today of Dr. Ryanne Mcfadden that does not see the MD until . He has a pain pump.   Does the pain pump stay in until he follows up with MD?    [9:30 AM] Avis Montez, patient is to pull the pump once empty     [9:31 AM] Lobo clark thanks           Exercises/Interventions:   Therapeutic Ex (35410) Sets/sec Reps Notes/CUES HEP   Bike ROM  Heel prop 8 min  5#   3 min ROM    Calf stretch belt 30s 5     HS stretch long 30s 5     Quad sets 10s 10     Heel slides   PT assist    Seated knee flex off EOB with use off opposite extremity for overpressure 10\" 10     SLR  ABD 1# 2  1# 3 15  10     TKE  BS  Bridges  Calf stretch incline  Calf raises  LAQ 3\"  10\"    30s    1# 30x  10x  20x  3  30x  30x Blue TB    3\" holds        4 week orthovitals due 4/25                  Pt education 10 min  Reviewed precautions, HEP with gradual progression, goals of PT, importance of early compliance with HEP for ROM, RICE principles- pt stated understanding    Manual Intervention ([id number])       Sup/inf patella mobs, AP jt mobs 8 min                                         NMR re-education (01925)   CUES NEEDED    NMR with quad sets 10 min                                                              Therapeutic Activity (02873)                     VASO  10'                   [facility] access code:  [email or web address]           Therapeutic Exercise and NMR EXR  [x] (62340) Provided verbal/tactile cueing for activities related to strengthening, flexibility, endurance, ROM for improvements in LE, proximal hip, and core control with self care, mobility, lifting, ambulation. [x] (50353) Provided verbal/tactile cueing for activities related to improving balance, coordination, kinesthetic sense, posture, motor skill, proprioception to assist with LE, proximal hip, and core control in self-care, mobility, lifting, ambulation and eccentric single leg control.      NMR and Therapeutic Activities:    [x] (30159 or 14606) Provided verbal/tactile cueing for activities related to improving balance, coordination, kinesthetic sense, posture, motor skill, proprioception and motor activation to allow for proper function of core, proximal hip and LE with self-care and ADLs and functional mobility.   [] (31921) Gait Re-education- Provided training and instruction to the patient for proper LE, core and proximal hip recruitment and positioning and eccentric body weight control with ambulation re-education including up and down stairs     Home Exercise Program:    [x] (06081) Reviewed/Progressed HEP activities related to strengthening, flexibility, endurance, ROM of core, proximal hip and LE for functional self-care, mobility, lifting and ambulation/stair navigation   [] (66419) Reviewed/Progressed HEP activities related to improving balance, coordination, kinesthetic sense, posture, motor skill, proprioception of core, proximal hip and LE for self-care, mobility, lifting, and ambulation/stair navigation      Manual Treatments:  PROM / STM / Oscillations-Mobs:  G-I, II, III, IV (PA's, Inf., Post.)  [x] (19886) Provided manual therapy to mobilize LE, proximal hip and/or LS spine soft tissue/joints for the purpose of modulating pain, promoting relaxation, increasing ROM, reducing/eliminating soft tissue swelling/inflammation/restriction, improving soft tissue extensibility and allowing for proper ROM for normal function with self-care, mobility, lifting and ambulation. Modalities:     [x] GAME READY (VASO)- for significant edema, swelling, pain control. Charges:  Timed Code Treatment Minutes: 55   Total Treatment Minutes:  65   BWC:  TE TIME:  NMR TIME:  MANUAL TIME:  UNTIMED MINUTES:  Medicare Total:         [] EVAL (LOW) 01337 (typically 20 minutes face-to-face)  [] EVAL (MOD) 74229 (typically 30 minutes face-to-face)  [] EVAL (HIGH) 98748 (typically 45 minutes face-to-face)  [] RE-EVAL     [x] YL(87382) x   2  [] IONTO  [x] NMR (98990) x  1   [x] VASO  [x] Manual (35840) x    1 [] Other:  [] TA x      [] Mech Traction (20819)  [] ES(attended) (05932)      [] ES (un) (19622):    ASSESSMENT:  ROM gradually increasing. No complaints at conclusion.  Educated to continue focus on ROM. Starting to add some strength to HEP with good understanding. GOALS:    Patient stated goal: Walking without pain. Therapist goals for Patient:   Short Term Goals: To be achieved in: 2 weeks  1. Independent in HEP and progression per patient tolerance, in order to prevent re-injury. [] Progressing: [] Met: [] Not Met: [] Adjusted     2. Patient will have a decrease in pain to facilitate improvement in movement, function, and ADLs as indicated by Functional Deficits. [] Progressing: [] Met: [] Not Met: [] Adjusted     Long Term Goals: To be achieved in: 12 weeks  1. Disability index score of 50/80 or better for the LEFS to assist with reaching prior level of function. [] Progressing: [] Met: [] Not Met: [] Adjusted     2. Patient will demonstrate increased AROM to 0-120 to allow for proper joint functioning as indicated by patients Functional Deficits. [] Progressing: [] Met: [] Not Met: [] Adjusted     3. Patient will demonstrate an increase in Strength to good proximal hip strength and control, within 5lb HHD in LE to allow for proper functional mobility as indicated by patients Functional Deficits. [] Progressing: [] Met: [] Not Met: [] Adjusted     4. Patient will return to functional activities including standing/walking 20-30 mins I no AD without increased symptoms or restriction. [] Progressing: [] Met: [] Not Met: [] Adjusted     5. Patient will ascend/descend 1 flight of steps I no AD (patient specific functional goal)    [] Progressing: [] Met: [] Not Met: [] Adjusted         Overall Progression Towards Functional goals/ Treatment Progress Update:  [] Patient is progressing as expected towards functional goals listed. [] Progression is slowed due to complexities/Impairments listed. [] Progression has been slowed due to co-morbidities.   [x] Plan just implemented, too soon to assess goals progression <30days   [] Goals require adjustment due to lack of progress  [] Patient is not progressing as expected and requires additional follow up with physician  [] Other    Prognosis for POC: [x] Good [] Fair  [] Poor      Patient requires continued skilled intervention: [x] Yes  [] No    Treatment/Activity Tolerance:  [x] Patient able to complete treatment  [] Patient limited by fatigue  [] Patient limited by pain    [] Patient limited by other medical complications  [] Other:     Return to Play: (if applicable)   []  Stage 1: Intro to Strength   []  Stage 2: Return to Run and Strength   []  Stage 3: Return to Jump and Strength   []  Stage 4: Dynamic Strength and Agility   []  Stage 5: Sport Specific Training     []  Ready to Return to Play, Meets All Above Stages   []  Not Ready for Return to Sports   Comments:                          PLAN:   [x] Continue per plan of care [] Alter current plan (see comments above)  [] Plan of care initiated [] Hold pending MD visit [] Discharge    Electronically signed by:  Adriana Mendosa, PT, DPT    Note: If patient does not return for scheduled/ recommended follow up visits, this note will serve as a discharge from care along with most recent update on progress.

## 2022-04-14 ENCOUNTER — OFFICE VISIT (OUTPATIENT)
Dept: ORTHOPEDIC SURGERY | Age: 67
End: 2022-04-14

## 2022-04-14 VITALS — BODY MASS INDEX: 27.59 KG/M2 | HEIGHT: 74 IN | WEIGHT: 215 LBS

## 2022-04-14 DIAGNOSIS — Z96.652 S/P TKR (TOTAL KNEE REPLACEMENT), LEFT: Primary | ICD-10-CM

## 2022-04-14 PROCEDURE — 99024 POSTOP FOLLOW-UP VISIT: CPT | Performed by: ORTHOPAEDIC SURGERY

## 2022-04-14 NOTE — PROGRESS NOTES
Dr Jennie Simmons      Date /Time 4/14/2022       9:50 AM EDT  Name Fili Colin             1955   Location  [facility]  MRN [id number]                Chief Complaint   Patient presents with    Post-Op Check     LEFT TKA 3/28/22        History of Present Illness      Fili Colin is a 77 y.o. male is here for post-op visit after LEFT  [address]275 Total Knee Arthroplasty    He is now 3-week status post left total knee replacement, doing quite well. He has no complaints. He is doing well with therapy    Physical Exam    Based off 1997 Exam Criteria    Ht 6' 2\" (1.88 m)   Wt 215 lb (97.5 kg)   BMI 27.60 kg/m²      Constitutional:       General: He is not in acute distress. Appearance: Normal appearance. LEFT Knee: incision clean, intact, healing appropriately. No surrounding  erythema or fluctuance. Neuro intact distal. No evidence of DVT. Range of motion 10 to 100 degrees, stable exam    Imaging       No new imaging today    Assessment and Plan    Christine Livingston was seen today for post-op check. Diagnoses and all orders for this visit:    S/P TKR (total knee replacement), left        Continue therapy  I will see him back in 3 weeks, I counseled him regarding full extension of the range of motion, which he clearly is working diligently on  Advised to wean away from walker, transition to cane, then to no assist      Electronically signed by Jennie Simmons MD on 4/14/2022 at 9:50 AM  This dictation was generated by voice recognition computer software. Although all attempts are made to edit the dictation for accuracy, there may be errors in the transcription that are not intended.

## 2022-04-18 ENCOUNTER — HOSPITAL ENCOUNTER (OUTPATIENT)
Dept: PHYSICAL THERAPY | Age: 67
Setting detail: THERAPIES SERIES
Discharge: HOME | End: 2022-04-18
Payer: COMMERCIAL

## 2022-04-18 PROCEDURE — 97016 VASOPNEUMATIC DEVICE THERAPY: CPT

## 2022-04-18 PROCEDURE — 97110 THERAPEUTIC EXERCISES: CPT

## 2022-04-18 PROCEDURE — 97140 MANUAL THERAPY 1/> REGIONS: CPT

## 2022-04-18 PROCEDURE — 97112 NEUROMUSCULAR REEDUCATION: CPT

## 2022-04-18 NOTE — FLOWSHEET NOTE
723 [facility] and 500 [facility], [address] [place] [address] [address] [place] [address], [address] [place] [address] [address]  Phone: [phone]   Fax:     [phone]      Physical Therapy Treatment Note/ Progress Report:     Date:  2022    Patient Name:  Caitlin Abebe    :  1955  MRN: [id number]  Restrictions/Precautions:    Medical/Treatment Diagnosis Information:  · Diagnosis: s/p L TKA D60.368 sx 3/28/22  · Treatment Diagnosis: Left knee pain M25. 095  Insurance/Certification information:  PT Insurance Information: Richa  Physician Information:  Referring Practitioner: Dr. Bianca Pope  Has the plan of care been signed (Y/N):        []  Yes  [x]  No     Date of Patient follow up with Physician: 22    Is this a Progress Report:     []  Yes  [x]  No      If Yes:  Date Range for reporting period:  Beginning: 3/30/22 ------------ Endin22    Progress report will be due (10 Rx or 30 days whichever is less):       Recertification will be due (POC Duration  / 90 days whichever is less): 22        Visit # Insurance Allowable Auth Required   In Person 7 30 (1 used) []  Yes     []  No    Tele Health 0  []  Yes     []  No    Total 7       Functional Scale: LEFS 21.5/80   Date assessed:  3/30/22     Latex Allergy:  [x]NO      []YES  Preferred Language for Healthcare:   [x]English       []other:    Pain level: 2/10     SUBJECTIVE:  Pt stated feeling okay today, stated got foot peddle bike at home. OBJECTIVE:  - 4 to 0 - 125 ° of flexion (wall slide) 124 hooklying knee flexion   Observation:    Test measurements:      RESTRICTIONS/PRECAUTIONS:  s/p L TKA 3/28/22; hx R RUBY  Spoke with MA can change dressing at  appt    [9:28 AM] Laurent Allison,    [9:29 AM] Laurent Garcia  Patient today of Dr. Bianca Pope that does not see the MD until . He has a pain pump.   Does the pain pump stay in until he follows up with MD?    [9:30 AM] Home Montez, patient is to pull the pump once empty     [9:31 AM] Sobeida singh           Exercises/Interventions:   Therapeutic Ex (36334) Sets/sec Reps Notes/CUES HEP   Bike ROM  Heel prop 8 min  5#   3 min ROM    Calf stretch belt 30s 5     HS stretch long 30s 5     Quad sets 10s 10     Heel slides   PT assist    Seated knee flex off EOB with use off opposite extremity for overpressure  Prone hang 10\"    5 min 10    5#     SLR  ABD 1# 2  1# 3 15  10     BS  Bridges  Calf stretch incline  Calf raises  Leg press  IRIS- ABD  TKE  Step ups  LAQ 10\"    30s  30sx3  85#  45#  60#  4 in  1# 10x  20x  3  3x10  3x10  2x10ea  30L20  30x   3\" holds        4 week orthovitals due 4/25                  Pt education 10 min  Reviewed precautions, HEP with gradual progression, goals of PT, importance of early compliance with HEP for ROM, RICE principles- pt stated understanding    Manual Intervention ([id number])       Sup/inf patella mobs, AP jt mobs 8 min                                         NMR re-education (85102)   CUES NEEDED    NMR with quad sets 10 min                                                              Therapeutic Activity (61308)                     VASO  10'                   [facility] access code:  [email or web address]           Therapeutic Exercise and NMR EXR  [x] (37766) Provided verbal/tactile cueing for activities related to strengthening, flexibility, endurance, ROM for improvements in LE, proximal hip, and core control with self care, mobility, lifting, ambulation. [x] (48049) Provided verbal/tactile cueing for activities related to improving balance, coordination, kinesthetic sense, posture, motor skill, proprioception to assist with LE, proximal hip, and core control in self-care, mobility, lifting, ambulation and eccentric single leg control.      NMR and Therapeutic Activities:    [x] (81438 or 31338) Provided verbal/tactile cueing for activities related to improving balance, coordination, kinesthetic sense, posture, motor skill, proprioception and motor activation to allow for proper function of core, proximal hip and LE with self-care and ADLs and functional mobility.   [] (88520) Gait Re-education- Provided training and instruction to the patient for proper LE, core and proximal hip recruitment and positioning and eccentric body weight control with ambulation re-education including up and down stairs     Home Exercise Program:    [x] (97881) Reviewed/Progressed HEP activities related to strengthening, flexibility, endurance, ROM of core, proximal hip and LE for functional self-care, mobility, lifting and ambulation/stair navigation   [] (93186) Reviewed/Progressed HEP activities related to improving balance, coordination, kinesthetic sense, posture, motor skill, proprioception of core, proximal hip and LE for self-care, mobility, lifting, and ambulation/stair navigation      Manual Treatments:  PROM / STM / Oscillations-Mobs:  G-I, II, III, IV (PA's, Inf., Post.)  [x] (95276) Provided manual therapy to mobilize LE, proximal hip and/or LS spine soft tissue/joints for the purpose of modulating pain, promoting relaxation, increasing ROM, reducing/eliminating soft tissue swelling/inflammation/restriction, improving soft tissue extensibility and allowing for proper ROM for normal function with self-care, mobility, lifting and ambulation. Modalities:     [x] GAME READY (VASO)- for significant edema, swelling, pain control.      Charges:  Timed Code Treatment Minutes: 55   Total Treatment Minutes:  65   BWC:  TE TIME:  NMR TIME:  MANUAL TIME:  UNTIMED MINUTES:  Medicare Total:         [] EVAL (LOW) 76879 (typically 20 minutes face-to-face)  [] EVAL (MOD) 12026 (typically 30 minutes face-to-face)  [] EVAL (HIGH) 21349 (typically 45 minutes face-to-face)  [] RE-EVAL     [x] XO(69784) x   2  [] IONTO  [x] NMR (42873) x  1   [x] VASO  [x] Manual (90913) x    1 [] Other:  [] TA x      [] Mech Traction (79482)  [] ES(attended) (51728)      [] ES (un) (36104):    ASSESSMENT:  ROM gradually increasing. No complaints at conclusion. Educated to continue focus on ROM. Added machines without issues. Starting to add some strength to HEP with good understanding. GOALS:    Patient stated goal: Walking without pain. Therapist goals for Patient:   Short Term Goals: To be achieved in: 2 weeks  1. Independent in HEP and progression per patient tolerance, in order to prevent re-injury. [] Progressing: [] Met: [] Not Met: [] Adjusted     2. Patient will have a decrease in pain to facilitate improvement in movement, function, and ADLs as indicated by Functional Deficits. [] Progressing: [] Met: [] Not Met: [] Adjusted     Long Term Goals: To be achieved in: 12 weeks  1. Disability index score of 50/80 or better for the LEFS to assist with reaching prior level of function. [] Progressing: [] Met: [] Not Met: [] Adjusted     2. Patient will demonstrate increased AROM to 0-120 to allow for proper joint functioning as indicated by patients Functional Deficits. [] Progressing: [] Met: [] Not Met: [] Adjusted     3. Patient will demonstrate an increase in Strength to good proximal hip strength and control, within 5lb HHD in LE to allow for proper functional mobility as indicated by patients Functional Deficits. [] Progressing: [] Met: [] Not Met: [] Adjusted     4. Patient will return to functional activities including standing/walking 20-30 mins I no AD without increased symptoms or restriction. [] Progressing: [] Met: [] Not Met: [] Adjusted     5. Patient will ascend/descend 1 flight of steps I no AD (patient specific functional goal)    [] Progressing: [] Met: [] Not Met: [] Adjusted         Overall Progression Towards Functional goals/ Treatment Progress Update:  [] Patient is progressing as expected towards functional goals listed. [] Progression is slowed due to complexities/Impairments listed.   [] Progression has been slowed due to co-morbidities. [x] Plan just implemented, too soon to assess goals progression <30days   [] Goals require adjustment due to lack of progress  [] Patient is not progressing as expected and requires additional follow up with physician  [] Other    Prognosis for POC: [x] Good [] Fair  [] Poor      Patient requires continued skilled intervention: [x] Yes  [] No    Treatment/Activity Tolerance:  [x] Patient able to complete treatment  [] Patient limited by fatigue  [] Patient limited by pain    [] Patient limited by other medical complications  [] Other:     Return to Play: (if applicable)   []  Stage 1: Intro to Strength   []  Stage 2: Return to Run and Strength   []  Stage 3: Return to Jump and Strength   []  Stage 4: Dynamic Strength and Agility   []  Stage 5: Sport Specific Training     []  Ready to Return to Play, Meets All Above Stages   []  Not Ready for Return to Sports   Comments:                          PLAN:   [x] Continue per plan of care [] Alter current plan (see comments above)  [] Plan of care initiated [] Hold pending MD visit [] Discharge    Electronically signed by:  Vanda Galicia PT, DPT    Note: If patient does not return for scheduled/ recommended follow up visits, this note will serve as a discharge from care along with most recent update on progress.

## 2022-04-20 ENCOUNTER — HOSPITAL ENCOUNTER (OUTPATIENT)
Dept: PHYSICAL THERAPY | Age: 67
Setting detail: THERAPIES SERIES
Discharge: HOME | End: 2022-04-20
Payer: COMMERCIAL

## 2022-04-20 PROCEDURE — 97140 MANUAL THERAPY 1/> REGIONS: CPT | Performed by: SPECIALIST/TECHNOLOGIST

## 2022-04-20 PROCEDURE — 97110 THERAPEUTIC EXERCISES: CPT | Performed by: SPECIALIST/TECHNOLOGIST

## 2022-04-20 PROCEDURE — 97016 VASOPNEUMATIC DEVICE THERAPY: CPT | Performed by: SPECIALIST/TECHNOLOGIST

## 2022-04-20 PROCEDURE — 97112 NEUROMUSCULAR REEDUCATION: CPT | Performed by: SPECIALIST/TECHNOLOGIST

## 2022-04-20 NOTE — FLOWSHEET NOTE
723 [facility] and 500 [facility], [address] [place] [address], [address] [place] [address] [address]  Phone: [phone]   Fax:     [phone]      Physical Therapy Treatment Note/ Progress Report:     Date:  2022    Patient Name:  Mickey Phan    :  1955  MRN: [id number]  Restrictions/Precautions:    Medical/Treatment Diagnosis Information:  · Diagnosis: s/p L TKA T61.570 sx 3/28/22  · Treatment Diagnosis: Left knee pain M25. 118  Insurance/Certification information:  PT Insurance Information: Richa  Physician Information:  Referring Practitioner: Dr. Giacomo Del Real  Has the plan of care been signed (Y/N):        []  Yes  [x]  No     Date of Patient follow up with Physician: 22    Is this a Progress Report:     []  Yes  [x]  No      If Yes:  Date Range for reporting period:  Beginning: 3/30/22 ------------ Endin22    Progress report will be due (10 Rx or 30 days whichever is less):       Recertification will be due (POC Duration  / 90 days whichever is less): 22        Visit # Insurance Allowable Auth Required   In Person 8 30 (1 used) []  Yes     []  No    Tele Health 0  []  Yes     []  No    Total 8       Functional Scale: LEFS 21.5/80   Date assessed:  3/30/22     Latex Allergy:  [x]NO      []YES  Preferred Language for Healthcare:   [x]English       []other:    Pain level: 2/10     SUBJECTIVE:  Pt stated feeling okay today, stated got foot peddle bike at home. OBJECTIVE:  0 - 125 ° of flexion sheet pulls   Observation:    Test measurements:      RESTRICTIONS/PRECAUTIONS:  s/p L TKA 3/28/22; hx R RUBY  Spoke with MA can change dressing at  appt    [9:28 AM] Lorena Rossi,    [9:29 AM] Lorena Plaza  Patient today of Dr. Giacomo Del Real that does not see the MD until . He has a pain pump.   Does the pain pump stay in until he follows up with MD?    [9:30 AM] Kaela Montez, patient is to pull the pump once empty     [9:31 AM] Nam Arrow  ok thanks           Exercises/Interventions:   Therapeutic Ex (45225) Sets/sec Reps Notes/CUES HEP   Bike ROM  Heel prop 8 min  5#   3 min ROM    Calf stretch belt 30s 5     HS stretch long 30s 5     Quad sets 10s 10     Heel slides   PT assist    Seated knee flex off EOB with use off opposite extremity for overpressure  Prone hang 10\"    5 min 10    5#     SLR  ABD 1# 2  1# 3 15  10     BS  Bridges  Calf stretch incline  Calf raises  Leg press  SL  IRIS- ABD            EXT  TKE  Step ups/Lat  Hamstring curls  Knee ext 10\"    30s  30sx3  120#  80#  50#  50#  90#  6 in  40#  20# 10x  20x  3  3x10  3x10  3x10  2x10ea  2x10ea  30L20  20x ea  3x10  3x10   3\" holds        4 week orthovitals due 4/25                  Pt education 10 min  Reviewed precautions, HEP with gradual progression, goals of PT, importance of early compliance with HEP for ROM, RICE principles- pt stated understanding    Manual Intervention ([address] [place] [address])       Sup/inf patella mobs, AP jt mobs 8 min                                         NMR re-education (76457)   CUES NEEDED    NMR with quad sets 10 min                                                              Therapeutic Activity (78521)                     VASO  10'                   [facility] access code:  [email or web address]           Therapeutic Exercise and NMR EXR  [x] (05402) Provided verbal/tactile cueing for activities related to strengthening, flexibility, endurance, ROM for improvements in LE, proximal hip, and core control with self care, mobility, lifting, ambulation. [x] (10936) Provided verbal/tactile cueing for activities related to improving balance, coordination, kinesthetic sense, posture, motor skill, proprioception to assist with LE, proximal hip, and core control in self-care, mobility, lifting, ambulation and eccentric single leg control.      NMR and Therapeutic Activities:    [x] (32182 or 35655) Provided verbal/tactile cueing for activities related to improving balance, coordination, kinesthetic sense, posture, motor skill, proprioception and motor activation to allow for proper function of core, proximal hip and LE with self-care and ADLs and functional mobility.   [] (60927) Gait Re-education- Provided training and instruction to the patient for proper LE, core and proximal hip recruitment and positioning and eccentric body weight control with ambulation re-education including up and down stairs     Home Exercise Program:    [x] (59805) Reviewed/Progressed HEP activities related to strengthening, flexibility, endurance, ROM of core, proximal hip and LE for functional self-care, mobility, lifting and ambulation/stair navigation   [] (30358) Reviewed/Progressed HEP activities related to improving balance, coordination, kinesthetic sense, posture, motor skill, proprioception of core, proximal hip and LE for self-care, mobility, lifting, and ambulation/stair navigation      Manual Treatments:  PROM / STM / Oscillations-Mobs:  G-I, II, III, IV (PA's, Inf., Post.)  [x] (54848) Provided manual therapy to mobilize LE, proximal hip and/or LS spine soft tissue/joints for the purpose of modulating pain, promoting relaxation, increasing ROM, reducing/eliminating soft tissue swelling/inflammation/restriction, improving soft tissue extensibility and allowing for proper ROM for normal function with self-care, mobility, lifting and ambulation. Modalities:     [x] GAME READY (VASO)- for significant edema, swelling, pain control.      Charges:  Timed Code Treatment Minutes: 55   Total Treatment Minutes:  65   BWC:  TE TIME:  NMR TIME:  MANUAL TIME:  UNTIMED MINUTES:  Medicare Total:         [] EVAL (LOW) 47481 (typically 20 minutes face-to-face)  [] EVAL (MOD) 54410 (typically 30 minutes face-to-face)  [] EVAL (HIGH) 62971 (typically 45 minutes face-to-face)  [] RE-EVAL     [x] BE(57440) x   2  [] IONTO  [x] NMR (20423) x  1   [x] VASO  [x] Manual (69572) x    1 [] Other:  [] TA x      [] Luis Reveles Traction (99519)  [] ES(attended) (07894)      [] ES (un) (32803):    ASSESSMENT:  ROM gradually increasing. No complaints at conclusion. Educated to continue focus on ROM. Added machines without issues. Starting to add some strength to HEP with good understanding. GOALS:    Patient stated goal: Walking without pain. Therapist goals for Patient:   Short Term Goals: To be achieved in: 2 weeks  1. Independent in HEP and progression per patient tolerance, in order to prevent re-injury. [] Progressing: [] Met: [] Not Met: [] Adjusted     2. Patient will have a decrease in pain to facilitate improvement in movement, function, and ADLs as indicated by Functional Deficits. [] Progressing: [] Met: [] Not Met: [] Adjusted     Long Term Goals: To be achieved in: 12 weeks  1. Disability index score of 50/80 or better for the LEFS to assist with reaching prior level of function. [] Progressing: [] Met: [] Not Met: [] Adjusted     2. Patient will demonstrate increased AROM to 0-120 to allow for proper joint functioning as indicated by patients Functional Deficits. [] Progressing: [] Met: [] Not Met: [] Adjusted     3. Patient will demonstrate an increase in Strength to good proximal hip strength and control, within 5lb HHD in LE to allow for proper functional mobility as indicated by patients Functional Deficits. [] Progressing: [] Met: [] Not Met: [] Adjusted     4. Patient will return to functional activities including standing/walking 20-30 mins I no AD without increased symptoms or restriction. [] Progressing: [] Met: [] Not Met: [] Adjusted     5. Patient will ascend/descend 1 flight of steps I no AD (patient specific functional goal)    [] Progressing: [] Met: [] Not Met: [] Adjusted         Overall Progression Towards Functional goals/ Treatment Progress Update:  [] Patient is progressing as expected towards functional goals listed. [] Progression is slowed due to complexities/Impairments listed.   [] Progression has been slowed due to co-morbidities. [x] Plan just implemented, too soon to assess goals progression <30days   [] Goals require adjustment due to lack of progress  [] Patient is not progressing as expected and requires additional follow up with physician  [] Other    Prognosis for POC: [x] Good [] Fair  [] Poor      Patient requires continued skilled intervention: [x] Yes  [] No    Treatment/Activity Tolerance:  [x] Patient able to complete treatment  [] Patient limited by fatigue  [] Patient limited by pain    [] Patient limited by other medical complications  [] Other:     Return to Play: (if applicable)   []  Stage 1: Intro to Strength   []  Stage 2: Return to Run and Strength   []  Stage 3: Return to Jump and Strength   []  Stage 4: Dynamic Strength and Agility   []  Stage 5: Sport Specific Training     []  Ready to Return to Play, Meets All Above Stages   []  Not Ready for Return to Sports   Comments:                          PLAN:   [x] Continue per plan of care [] Alter current plan (see comments above)  [] Plan of care initiated [] Hold pending MD visit [] Discharge    Electronically signed by:  Jennifer Cage PTA, ATC  Note: If patient does not return for scheduled/ recommended follow up visits, this note will serve as a discharge from care along with most recent update on progress.

## 2022-04-25 ENCOUNTER — HOSPITAL ENCOUNTER (OUTPATIENT)
Dept: PHYSICAL THERAPY | Age: 67
Setting detail: THERAPIES SERIES
Discharge: HOME | End: 2022-04-25
Payer: COMMERCIAL

## 2022-04-25 PROCEDURE — 97140 MANUAL THERAPY 1/> REGIONS: CPT

## 2022-04-25 PROCEDURE — 97016 VASOPNEUMATIC DEVICE THERAPY: CPT

## 2022-04-25 PROCEDURE — 97112 NEUROMUSCULAR REEDUCATION: CPT

## 2022-04-25 PROCEDURE — 97110 THERAPEUTIC EXERCISES: CPT

## 2022-04-25 NOTE — FLOWSHEET NOTE
723 [facility] and 500 [facility], [address] [place] [address], [address] [place] [address] [address]  Phone: [phone]   Fax:     [phone]      Physical Therapy Treatment Note/ Progress Report:     Date:  2022    Patient Name:  Norma Hawthorne    :  1955  MRN: [id number]  Restrictions/Precautions:    Medical/Treatment Diagnosis Information:  · Diagnosis: s/p L TKA Z32.410 sx 3/28/22  · Treatment Diagnosis: Left knee pain M25. 365  Insurance/Certification information:  PT Insurance Information: Richa  Physician Information:  Referring Practitioner: Dr. Peg Omalley  Has the plan of care been signed (Y/N):        []  Yes  [x]  No     Date of Patient follow up with Physician: 22    Is this a Progress Report:     []  Yes  [x]  No      If Yes:  Date Range for reporting period:  Beginning: 3/30/22 ------------ Endin22    Progress report will be due (10 Rx or 30 days whichever is less):       Recertification will be due (POC Duration  / 90 days whichever is less): 22        Visit # Insurance Allowable Auth Required   In Person 9 30 (1 used) []  Yes     []  No    Tele Health 0  []  Yes     []  No    Total 9       Functional Scale: LEFS 21.5/80   Date assessed:  3/30/22     Latex Allergy:  [x]NO      []YES  Preferred Language for Healthcare:   [x]English       []other:    Pain level: 2/10     SUBJECTIVE:  Pt stated feels a little improvement every day. OBJECTIVE:  0 (after manual therapy) - 126 ° of flexion sheet pulls   Observation:    Test measurements:      RESTRICTIONS/PRECAUTIONS:  s/p L TKA 3/28/22; hx R RUBY  Spoke with MA can change dressing at  appt    [9:28 AM] Jose Elias August,    [9:29 AM] Jose Elias Mendez  Patient today of Dr. Peg Omalley that does not see the MD until . He has a pain pump.   Does the pain pump stay in until he follows up with MD?    [9:30 AM] Nga Montez  Nope, patient is to pull the pump once empty     [9:31 AM] Yairnavneet Paredes  ok thanks           Exercises/Interventions:   Therapeutic Ex (45689) Sets/sec Reps Notes/CUES HEP   Bike ROM  Heel prop 8 min  5#   3 min ROM    Calf stretch belt 30s 5     HS stretch long 30s 5     Quad sets 10s 10     Heel slides   PT assist    Seated knee flex off EOB with use off opposite extremity for overpressure  Prone hang 10\"    5 min 10    5#     SLR  ABD 1# 2  1# 3 15  10     BS  Bridges  Calf stretch incline  Calf raises  Leg press  SL  IRIS- ABD            EXT  TKE  Step ups/Lat  Hamstring curls  Single leg  Knee ext 10\"    30s  30sx3  125#  85#  50#  50#  90#  6 in  40#  30#  30# 10x  20x  3  3x10  3x10  3x10  3x10ea  3x10ea  30L  30x ea  3x10  3x10  3x10   3\" holds        4 week orthovitals due 4/25                  Pt education 10 min  Reviewed precautions, HEP with gradual progression, goals of PT, importance of early compliance with HEP for ROM, RICE principles- pt stated understanding    Manual Intervention (21267)       Sup/inf patella mobs, AP jt mobs 8 min                                         NMR re-education (03423)   CUES NEEDED    NMR with quad sets                                                               Therapeutic Activity (21523)                     VASO  10'                   [facility] access code:  [email or web address]           Therapeutic Exercise and NMR EXR  [x] (70186) Provided verbal/tactile cueing for activities related to strengthening, flexibility, endurance, ROM for improvements in LE, proximal hip, and core control with self care, mobility, lifting, ambulation. [x] (55738) Provided verbal/tactile cueing for activities related to improving balance, coordination, kinesthetic sense, posture, motor skill, proprioception to assist with LE, proximal hip, and core control in self-care, mobility, lifting, ambulation and eccentric single leg control.      NMR and Therapeutic Activities:    [x] (70534 or 30852) Provided verbal/tactile cueing for activities related to improving balance, coordination, kinesthetic sense, posture, motor skill, proprioception and motor activation to allow for proper function of core, proximal hip and LE with self-care and ADLs and functional mobility.   [] (75117) Gait Re-education- Provided training and instruction to the patient for proper LE, core and proximal hip recruitment and positioning and eccentric body weight control with ambulation re-education including up and down stairs     Home Exercise Program:    [x] (38290) Reviewed/Progressed HEP activities related to strengthening, flexibility, endurance, ROM of core, proximal hip and LE for functional self-care, mobility, lifting and ambulation/stair navigation   [] (15573) Reviewed/Progressed HEP activities related to improving balance, coordination, kinesthetic sense, posture, motor skill, proprioception of core, proximal hip and LE for self-care, mobility, lifting, and ambulation/stair navigation      Manual Treatments:  PROM / STM / Oscillations-Mobs:  G-I, II, III, IV (PA's, Inf., Post.)  [x] (78003) Provided manual therapy to mobilize LE, proximal hip and/or LS spine soft tissue/joints for the purpose of modulating pain, promoting relaxation, increasing ROM, reducing/eliminating soft tissue swelling/inflammation/restriction, improving soft tissue extensibility and allowing for proper ROM for normal function with self-care, mobility, lifting and ambulation. Modalities:     [x] GAME READY (VASO)- for significant edema, swelling, pain control.      Charges:  Timed Code Treatment Minutes: 55   Total Treatment Minutes:  65   BWC:  TE TIME:  NMR TIME:  MANUAL TIME:  UNTIMED MINUTES:  Medicare Total:         [] EVAL (LOW) 33949 (typically 20 minutes face-to-face)  [] EVAL (MOD) 62187 (typically 30 minutes face-to-face)  [] EVAL (HIGH) 63658 (typically 45 minutes face-to-face)  [] RE-EVAL     [x] UC(20472) x   2  [] IONTO  [x] NMR (97464) x  1   [x] VASO  [] Manual (66921) x     [] Other:  [x] TA x   1   [] [facility] Traction (73471)  [] ES(attended) (35109)      [] ES (un) (12744):    ASSESSMENT:  ROM gradually increasing. No complaints at conclusion. Educated to continue focus on ROM. Added machines without issues. Starting to add some strength to HEP with good understanding. GOALS:    Patient stated goal: Walking without pain. Therapist goals for Patient:   Short Term Goals: To be achieved in: 2 weeks  1. Independent in HEP and progression per patient tolerance, in order to prevent re-injury. [] Progressing: [] Met: [] Not Met: [] Adjusted     2. Patient will have a decrease in pain to facilitate improvement in movement, function, and ADLs as indicated by Functional Deficits. [] Progressing: [] Met: [] Not Met: [] Adjusted     Long Term Goals: To be achieved in: 12 weeks  1. Disability index score of 50/80 or better for the LEFS to assist with reaching prior level of function. [] Progressing: [] Met: [] Not Met: [] Adjusted     2. Patient will demonstrate increased AROM to 0-120 to allow for proper joint functioning as indicated by patients Functional Deficits. [] Progressing: [] Met: [] Not Met: [] Adjusted     3. Patient will demonstrate an increase in Strength to good proximal hip strength and control, within 5lb HHD in LE to allow for proper functional mobility as indicated by patients Functional Deficits. [] Progressing: [] Met: [] Not Met: [] Adjusted     4. Patient will return to functional activities including standing/walking 20-30 mins I no AD without increased symptoms or restriction. [] Progressing: [] Met: [] Not Met: [] Adjusted     5. Patient will ascend/descend 1 flight of steps I no AD (patient specific functional goal)    [] Progressing: [] Met: [] Not Met: [] Adjusted         Overall Progression Towards Functional goals/ Treatment Progress Update:  [] Patient is progressing as expected towards functional goals listed.     [] Progression is slowed due to complexities/Impairments listed. [] Progression has been slowed due to co-morbidities. [x] Plan just implemented, too soon to assess goals progression <30days   [] Goals require adjustment due to lack of progress  [] Patient is not progressing as expected and requires additional follow up with physician  [] Other    Prognosis for POC: [x] Good [] Fair  [] Poor      Patient requires continued skilled intervention: [x] Yes  [] No    Treatment/Activity Tolerance:  [x] Patient able to complete treatment  [] Patient limited by fatigue  [] Patient limited by pain    [] Patient limited by other medical complications  [] Other:     Return to Play: (if applicable)   []  Stage 1: Intro to Strength   []  Stage 2: Return to Run and Strength   []  Stage 3: Return to Jump and Strength   []  Stage 4: Dynamic Strength and Agility   []  Stage 5: Sport Specific Training     []  Ready to Return to Play, Meets All Above Stages   []  Not Ready for Return to Sports   Comments:                          PLAN:   [x] Continue per plan of care [] Alter current plan (see comments above)  [] Plan of care initiated [] Hold pending MD visit [] Discharge    Electronically signed by:  Kamari Kraus, PT, DPT  Note: If patient does not return for scheduled/ recommended follow up visits, this note will serve as a discharge from care along with most recent update on progress.

## 2022-04-27 ENCOUNTER — HOSPITAL ENCOUNTER (OUTPATIENT)
Dept: PHYSICAL THERAPY | Age: 67
Setting detail: THERAPIES SERIES
Discharge: HOME | End: 2022-04-27
Payer: COMMERCIAL

## 2022-04-27 PROCEDURE — 97530 THERAPEUTIC ACTIVITIES: CPT

## 2022-04-27 PROCEDURE — 97110 THERAPEUTIC EXERCISES: CPT

## 2022-04-27 PROCEDURE — 97112 NEUROMUSCULAR REEDUCATION: CPT

## 2022-04-27 PROCEDURE — 97016 VASOPNEUMATIC DEVICE THERAPY: CPT

## 2022-04-27 NOTE — FLOWSHEET NOTE
723 [facility] and 500 [facility], [address] [place] [address], [address] [place] [address] [address]  Phone: [phone]   Fax:     [phone]      Physical Therapy Treatment Note/ Progress Report:     Date:  2022    Patient Name:  Ya Gates    :  1955  MRN: [id number]  Restrictions/Precautions:    Medical/Treatment Diagnosis Information:  · Diagnosis: s/p L TKA J26.590 sx 3/28/22  · Treatment Diagnosis: Left knee pain M25. 084  Insurance/Certification information:  PT Insurance Information: Richa  Physician Information:  Referring Practitioner: Dr. Deena Kang  Has the plan of care been signed (Y/N):        []  Yes  [x]  No     Date of Patient follow up with Physician: 22    Is this a Progress Report:     []  Yes  [x]  No      If Yes:  Date Range for reporting period:  Beginning: 3/30/22 ------------ Endin22    Progress report will be due (10 Rx or 30 days whichever is less):       Recertification will be due (POC Duration  / 90 days whichever is less): 22        Visit # Insurance Allowable Auth Required   In Person 10 30 (1 used) []  Yes     []  No    Tele Health 0  []  Yes     []  No    Total 10       Functional Scale: LEFS 21.5/80   Date assessed:  3/30/22     Latex Allergy:  [x]NO      []YES  Preferred Language for Healthcare:   [x]English       []other:    Pain level: 2/10     SUBJECTIVE:  Pt stated feels a little improvement every day. OBJECTIVE:  0 (after manual therapy) - 126 ° of flexion sheet pulls   Observation:    Test measurements:              RESTRICTIONS/PRECAUTIONS:  s/p L TKA 3/28/22; hx R RUBY  Spoke with MA can change dressing at  appt    [9:28 AM] Holly Tomah  Maciel Quene,    [9:29 AM] Holly Tomah  Patient today of Dr. Deena Kang that does not see the MD until . He has a pain pump.   Does the pain pump stay in until he follows up with MD?    [9:30 AM] Bhumika Montez, patient is to pull the pump once empty [9:31 AM] Lorena singh           Exercises/Interventions:   Therapeutic Ex (18554) Sets/sec Reps Notes/CUES HEP   Bike ROM  Heel prop 8 min  5#   3 min ROM    Calf stretch belt 30s 5     HS stretch long 30s 5     Quad sets 10s 10     Heel slides   PT assist    Seated knee flex off EOB with use off opposite extremity for overpressure  Prone hang 10\"    5 min 10    5#     SLR  ABD 1# 2  1# 3 15  10     BS  Bridges  Calf stretch incline  Calf raises  Leg press  SL  IRIS- ABD            EXT  TKE  Step ups/Lat  Hamstring curls  Single leg  Knee ext 10\"    30s  30sx3  125#  85#  50#  50#  90#  6 in  40#  30#  30# 10x  20x  3  3x10  3x10  3x10  3x10ea  3x10ea  30L  30x ea  3x10  3x10  3x10   3\" holds        4 week orthovitals due 4/25                  Pt education 10 min  Reviewed precautions, HEP with gradual progression, goals of PT, importance of early compliance with HEP for ROM, RICE principles- pt stated understanding    Manual Intervention (21074)       Sup/inf patella mobs, AP jt mobs 8 min                                         NMR re-education (11439)   CUES NEEDED    NMR with quad sets                                                               Therapeutic Activity (27720)                     VASO  10'                   [facility] access code:  [email or web address]           Therapeutic Exercise and NMR EXR  [x] (59441) Provided verbal/tactile cueing for activities related to strengthening, flexibility, endurance, ROM for improvements in LE, proximal hip, and core control with self care, mobility, lifting, ambulation. [x] (59140) Provided verbal/tactile cueing for activities related to improving balance, coordination, kinesthetic sense, posture, motor skill, proprioception to assist with LE, proximal hip, and core control in self-care, mobility, lifting, ambulation and eccentric single leg control.      NMR and Therapeutic Activities:    [x] (30229 or 57975) Provided verbal/tactile cueing for activities related to improving balance, coordination, kinesthetic sense, posture, motor skill, proprioception and motor activation to allow for proper function of core, proximal hip and LE with self-care and ADLs and functional mobility.   [] (94584) Gait Re-education- Provided training and instruction to the patient for proper LE, core and proximal hip recruitment and positioning and eccentric body weight control with ambulation re-education including up and down stairs     Home Exercise Program:    [x] (46489) Reviewed/Progressed HEP activities related to strengthening, flexibility, endurance, ROM of core, proximal hip and LE for functional self-care, mobility, lifting and ambulation/stair navigation   [] (11799) Reviewed/Progressed HEP activities related to improving balance, coordination, kinesthetic sense, posture, motor skill, proprioception of core, proximal hip and LE for self-care, mobility, lifting, and ambulation/stair navigation      Manual Treatments:  PROM / STM / Oscillations-Mobs:  G-I, II, III, IV (PA's, Inf., Post.)  [x] (19907) Provided manual therapy to mobilize LE, proximal hip and/or LS spine soft tissue/joints for the purpose of modulating pain, promoting relaxation, increasing ROM, reducing/eliminating soft tissue swelling/inflammation/restriction, improving soft tissue extensibility and allowing for proper ROM for normal function with self-care, mobility, lifting and ambulation. Modalities:     [x] GAME READY (VASO)- for significant edema, swelling, pain control.      Charges:  Timed Code Treatment Minutes: 55   Total Treatment Minutes:  65   BWC:  TE TIME:  NMR TIME:  MANUAL TIME:  UNTIMED MINUTES:  Medicare Total:         [] EVAL (LOW) 65460 (typically 20 minutes face-to-face)  [] EVAL (MOD) 26011 (typically 30 minutes face-to-face)  [] EVAL (HIGH) 13075 (typically 45 minutes face-to-face)  [] RE-EVAL     [x] UF(90096) x   2  [] IONTO  [x] NMR (95829) x  1   [x] VASO  [] Manual (53991) x     [] Other:  [x] TA x   1   [] [facility] Traction (73875)  [] ES(attended) (02658)      [] ES (un) (48267):    ASSESSMENT:  ROM gradually increasing. No complaints at conclusion. Educated to continue focus on ROM especially on full extension. Added machines without issues. Starting to add some strength to HEP with good understanding. GOALS:    Patient stated goal: Walking without pain. Therapist goals for Patient:   Short Term Goals: To be achieved in: 2 weeks  1. Independent in HEP and progression per patient tolerance, in order to prevent re-injury. [] Progressing: [] Met: [] Not Met: [] Adjusted     2. Patient will have a decrease in pain to facilitate improvement in movement, function, and ADLs as indicated by Functional Deficits. [] Progressing: [] Met: [] Not Met: [] Adjusted     Long Term Goals: To be achieved in: 12 weeks  1. Disability index score of 50/80 or better for the LEFS to assist with reaching prior level of function. [] Progressing: [] Met: [] Not Met: [] Adjusted     2. Patient will demonstrate increased AROM to 0-120 to allow for proper joint functioning as indicated by patients Functional Deficits. [] Progressing: [] Met: [] Not Met: [] Adjusted     3. Patient will demonstrate an increase in Strength to good proximal hip strength and control, within 5lb HHD in LE to allow for proper functional mobility as indicated by patients Functional Deficits. [] Progressing: [] Met: [] Not Met: [] Adjusted     4. Patient will return to functional activities including standing/walking 20-30 mins I no AD without increased symptoms or restriction. [] Progressing: [] Met: [] Not Met: [] Adjusted     5. Patient will ascend/descend 1 flight of steps I no AD (patient specific functional goal)    [] Progressing: [] Met: [] Not Met: [] Adjusted         Overall Progression Towards Functional goals/ Treatment Progress Update:  [] Patient is progressing as expected towards functional goals listed.     []

## 2022-04-27 NOTE — PROGRESS NOTES
[address] [place] [address] and [facility], [address] [place] [address], [address] [place] [address] [address]  Phone: [phone]   Fax: [phone]    Date: 2022          Patient Name; :  Miryam Zarate; 1955   · Dx:    s/p L TKA K12.936 sx 3/28/22        Physician:   Dr. Ryanne Mcfadden      Total PT Visits:  9     Measures Previous Current   Pain (0-10) 4-6 2   Disability % 21.5 33   ROM - 16 - 80 -5 - 127             Strength poor Fair+/good               Specific Functional Improvements & Impressions:  ROM, strength and function gradually progressing. Working on normal gait with SPC and no AD. Plan & Recommendations:  [] Continue rehabilitation due to objective improvement and continued functional deficits with frequency and duration:  [] Progress toward  []GAP, []Work Conditioning, []Independent HEP   [] Discharge due to   [] All goals achieved, [] Maximized \"medical necessity\" [] No subjective or objective improvements      Electronically signed by:  Sid Vasquez, PT  Therapy Plan of Care Re-Certification  This patient has been re-evaluated for physical therapy services and for therapy to continue, Medicare, Medicaid and other insurances require periodic physician review of the treatment plan. Please review the above re-evaluation and verify that you agree with plan of care as established above by signing the attached document and return it to our office or note changes to established plan below  [] Follow treatment plan as above [] Discontinue physical therapy  [] Change plan to:                                 __________________________________________________    Physician Signature:____________________________________ Date:____________  By signing above, therapists plan is approved by physician    If you have any questions or concerns, please don't hesitate to call.   Thank you for your referral.

## 2022-05-02 ENCOUNTER — HOSPITAL ENCOUNTER (OUTPATIENT)
Dept: PHYSICAL THERAPY | Age: 67
Setting detail: THERAPIES SERIES
Discharge: HOME | End: 2022-05-02
Payer: COMMERCIAL

## 2022-05-02 PROCEDURE — 97016 VASOPNEUMATIC DEVICE THERAPY: CPT

## 2022-05-02 PROCEDURE — 97112 NEUROMUSCULAR REEDUCATION: CPT

## 2022-05-02 PROCEDURE — 97110 THERAPEUTIC EXERCISES: CPT

## 2022-05-02 PROCEDURE — 97140 MANUAL THERAPY 1/> REGIONS: CPT

## 2022-05-02 NOTE — FLOWSHEET NOTE
723 [facility] and [address], [address] [place] [address] [address]  Phone: [phone]   Fax:     [phone]      Physical Therapy Treatment Note/ Progress Report:     Date:  2022    Patient Name:  Mickey Phan    :  1955  MRN: [id number]  Restrictions/Precautions:    Medical/Treatment Diagnosis Information:  · Diagnosis: s/p L TKA X43.134 sx 3/28/22  · Treatment Diagnosis: Left knee pain M25. 576  Insurance/Certification information:  PT Insurance Information: Richa  Physician Information:  Referring Practitioner: Dr. Giacomo Del Real  Has the plan of care been signed (Y/N):        []  Yes  [x]  No     Date of Patient follow up with Physician: 22    Is this a Progress Report:     []  Yes  [x]  No      If Yes:  Date Range for reporting period:  Beginning: 3/30/22 ------------ Endin22    Progress report will be due (10 Rx or 30 days whichever is less):       Recertification will be due (POC Duration  / 90 days whichever is less): 22        Visit # Insurance Allowable Auth Required   In Person 11 30 (1 used) []  Yes     []  No    Tele Health 0  []  Yes     []  No    Total 11       Functional Scale: LEFS 21.5/80   Date assessed:  3/30/22     Latex Allergy:  [x]NO      []YES  Preferred Language for Healthcare:   [x]English       []other:    Pain level: 2/10     SUBJECTIVE:  Pt stated feels a little improvement every day. OBJECTIVE:  0 (after manual therapy) - 126 ° of flexion sheet pulls   Observation:    Test measurements:            RESTRICTIONS/PRECAUTIONS:  s/p L TKA 3/28/22; hx R RUBY  Spoke with MA can change dressing at  appt    [9:28 AM] Lorena Rossi,    [9:29 AM] Lorena Plaza  Patient today of Dr. Giacomo Del Real that does not see the MD until . He has a pain pump.   Does the pain pump stay in until he follows up with MD?    [9:30 AM] Kaela Montez, patient is to pull the pump once empty [9:31 AM] Jose Miller  ok thanks           Exercises/Interventions:   Therapeutic Ex (91175) Sets/sec Reps Notes/CUES HEP   Bike ROM  Heel prop 8 min  5#   3 min ROM    Calf stretch belt 30s 5     HS stretch long 30s 5     Quad sets 10s 10     Heel slides   PT assist    Seated knee flex off EOB with use off opposite extremity for overpressure  Prone hang 10\"    5 min 10    5#     SLR  ABD 1# 2  1# 3 15  10     BS  Bridges  Calf stretch incline  Calf raises  Leg press  SL  IRIS- ABD            EXT  TKE  Step ups/Lat  Hamstring curls- single leg  Knee ext- single leg  SLS 10\"    30s  30sx3  125#  85#  55#  55#  90#  6 in  45#  35#  10s   10x  20x  3  3x10  3x10  3x10  3x10ea  3x10ea  30L  30x ea  3x10  3x10  10     3\" holds                      Foam, vc        4 week orthovitals due 5/23                  Pt education 10 min  Reviewed precautions, HEP with gradual progression, goals of PT, importance of early compliance with HEP for ROM, RICE principles- pt stated understanding    Manual Intervention (09442)       Sup/inf patella mobs, AP jt mobs 8 min                                         NMR re-education (00335)   CUES NEEDED    NMR with quad sets                                                               Therapeutic Activity (21132)                     VASO  10'                   [facility] access code:  [email or web address]           Therapeutic Exercise and NMR EXR  [x] (73533) Provided verbal/tactile cueing for activities related to strengthening, flexibility, endurance, ROM for improvements in LE, proximal hip, and core control with self care, mobility, lifting, ambulation. [x] (26620) Provided verbal/tactile cueing for activities related to improving balance, coordination, kinesthetic sense, posture, motor skill, proprioception to assist with LE, proximal hip, and core control in self-care, mobility, lifting, ambulation and eccentric single leg control.      NMR and Therapeutic Activities:    [x] (35094 or 65185) Provided verbal/tactile cueing for activities related to improving balance, coordination, kinesthetic sense, posture, motor skill, proprioception and motor activation to allow for proper function of core, proximal hip and LE with self-care and ADLs and functional mobility.   [] (53271) Gait Re-education- Provided training and instruction to the patient for proper LE, core and proximal hip recruitment and positioning and eccentric body weight control with ambulation re-education including up and down stairs     Home Exercise Program:    [x] (98659) Reviewed/Progressed HEP activities related to strengthening, flexibility, endurance, ROM of core, proximal hip and LE for functional self-care, mobility, lifting and ambulation/stair navigation   [] (06764) Reviewed/Progressed HEP activities related to improving balance, coordination, kinesthetic sense, posture, motor skill, proprioception of core, proximal hip and LE for self-care, mobility, lifting, and ambulation/stair navigation      Manual Treatments:  PROM / STM / Oscillations-Mobs:  G-I, II, III, IV (PA's, Inf., Post.)  [x] (67660) Provided manual therapy to mobilize LE, proximal hip and/or LS spine soft tissue/joints for the purpose of modulating pain, promoting relaxation, increasing ROM, reducing/eliminating soft tissue swelling/inflammation/restriction, improving soft tissue extensibility and allowing for proper ROM for normal function with self-care, mobility, lifting and ambulation. Modalities:     [x] GAME READY (VASO)- for significant edema, swelling, pain control.      Charges:  Timed Code Treatment Minutes: 55   Total Treatment Minutes:  65   BWC:  TE TIME:  NMR TIME:  MANUAL TIME:  UNTIMED MINUTES:  Medicare Total:         [] EVAL (LOW) 18693 (typically 20 minutes face-to-face)  [] EVAL (MOD) 79925 (typically 30 minutes face-to-face)  [] EVAL (HIGH) 49378 (typically 45 minutes face-to-face)  [] RE-EVAL     [x] YO(01799) x   2  [] IONTO  [x] NMR (54333) x  1   [x] VASO  [x] Manual (10394) x    1 [] Other:  [] TA x       [] [facility] Traction (94400)  [] ES(attended) (36108)      [] ES (un) (50850):    ASSESSMENT:  ROM gradually increasing. No complaints at conclusion. Educated to continue focus on ROM especially on full extension. Added machines without issues. Starting to add some strength to HEP with good understanding. GOALS:    Patient stated goal: Walking without pain. Therapist goals for Patient:   Short Term Goals: To be achieved in: 2 weeks  1. Independent in HEP and progression per patient tolerance, in order to prevent re-injury. [x] Progressing: [] Met: [] Not Met: [] Adjusted     2. Patient will have a decrease in pain to facilitate improvement in movement, function, and ADLs as indicated by Functional Deficits. [x] Progressing: [] Met: [] Not Met: [] Adjusted     Long Term Goals: To be achieved in: 12 weeks  1. Disability index score of 50/80 or better for the LEFS to assist with reaching prior level of function. [x] Progressing: [] Met: [] Not Met: [] Adjusted     2. Patient will demonstrate increased AROM to 0-120 to allow for proper joint functioning as indicated by patients Functional Deficits. [x] Progressing: [] Met: [] Not Met: [] Adjusted     3. Patient will demonstrate an increase in Strength to good proximal hip strength and control, within 5lb HHD in LE to allow for proper functional mobility as indicated by patients Functional Deficits. [x] Progressing: [] Met: [] Not Met: [] Adjusted     4. Patient will return to functional activities including standing/walking 20-30 mins I no AD without increased symptoms or restriction. [x] Progressing: [] Met: [] Not Met: [] Adjusted     5.  Patient will ascend/descend 1 flight of steps I no AD (patient specific functional goal)    [x] Progressing: [] Met: [] Not Met: [] Adjusted         Overall Progression Towards Functional goals/ Treatment Progress Update:  [x] Patient is progressing as expected towards functional goals listed. [] Progression is slowed due to complexities/Impairments listed. [] Progression has been slowed due to co-morbidities. [] Plan just implemented, too soon to assess goals progression <30days   [] Goals require adjustment due to lack of progress  [] Patient is not progressing as expected and requires additional follow up with physician  [] Other    Prognosis for POC: [x] Good [] Fair  [] Poor      Patient requires continued skilled intervention: [x] Yes  [] No    Treatment/Activity Tolerance:  [x] Patient able to complete treatment  [] Patient limited by fatigue  [] Patient limited by pain    [] Patient limited by other medical complications  [] Other:     Return to Play: (if applicable)   []  Stage 1: Intro to Strength   []  Stage 2: Return to Run and Strength   []  Stage 3: Return to Jump and Strength   []  Stage 4: Dynamic Strength and Agility   []  Stage 5: Sport Specific Training     []  Ready to Return to Play, Meets All Above Stages   []  Not Ready for Return to Sports   Comments:                          PLAN:   [x] Continue per plan of care [] Alter current plan (see comments above)  [] Plan of care initiated [] Hold pending MD visit [] Discharge    Electronically signed by:  Sg Nichole PT, DPT  Note: If patient does not return for scheduled/ recommended follow up visits, this note will serve as a discharge from care along with most recent update on progress.

## 2022-05-04 ENCOUNTER — HOSPITAL ENCOUNTER (OUTPATIENT)
Dept: PHYSICAL THERAPY | Age: 67
Setting detail: THERAPIES SERIES
Discharge: HOME | End: 2022-05-04
Payer: COMMERCIAL

## 2022-05-04 PROCEDURE — 97016 VASOPNEUMATIC DEVICE THERAPY: CPT

## 2022-05-04 PROCEDURE — 97110 THERAPEUTIC EXERCISES: CPT

## 2022-05-04 PROCEDURE — 97112 NEUROMUSCULAR REEDUCATION: CPT

## 2022-05-04 PROCEDURE — 97530 THERAPEUTIC ACTIVITIES: CPT

## 2022-05-04 NOTE — FLOWSHEET NOTE
[address]23 [facility] and [address], [address] [place] [address] [address]  Phone: [phone]   Fax:     [phone]      Physical Therapy Treatment Note/ Progress Report:     Date:  2022    Patient Name:  Gerri Weinstein    :  1955  MRN: [id number]  Restrictions/Precautions:    Medical/Treatment Diagnosis Information:  · Diagnosis: s/p L TKA F41.001 sx 3/28/22  · Treatment Diagnosis: Left knee pain M25. 202  Insurance/Certification information:  PT Insurance Information: Richa  Physician Information:  Referring Practitioner: Dr. Rosita Zapata  Has the plan of care been signed (Y/N):        []  Yes  [x]  No     Date of Patient follow up with Physician: 22    Is this a Progress Report:     []  Yes  [x]  No      If Yes:  Date Range for reporting period:  Beginning: 3/30/22 ------------ Endin22    Progress report will be due (10 Rx or 30 days whichever is less):       Recertification will be due (POC Duration  / 90 days whichever is less): 22        Visit # Insurance Allowable Auth Required   In Person 12 30 (1 used) []  Yes     []  No    Tele Health 0  []  Yes     []  No    Total 12       Functional Scale: LEFS 21.5/80   Date assessed:  3/30/22     Latex Allergy:  [x]NO      []YES  Preferred Language for Healthcare:   [x]English       []other:    Pain level: 2/10     SUBJECTIVE:  Pt stated feels a little improvement every day. OBJECTIVE:  0 (after manual therapy) - 126 ° of flexion sheet pulls   Observation:    Test measurements:            RESTRICTIONS/PRECAUTIONS:  s/p L TKA 3/28/22; hx R RBUY  Spoke with MA can change dressing at  appt    [9:28 AM] David Harvey,    [9:29 AM] David Silver  Patient today of Dr. Rosita Zapata that does not see the MD until . He has a pain pump.   Does the pain pump stay in until he follows up with MD?    [9:30 AM] Sandra Montez, patient is to pull the pump once empty [9:31 AM] Boaz Reach  ok thanks           Exercises/Interventions:   Therapeutic Ex (25951) Sets/sec Reps Notes/CUES HEP   Bike ROM  Heel prop 8 min  5#   3 min ROM    Calf stretch belt 30s 5     HS stretch long 30s 5     Quad sets 10s 10     Heel slides   PT assist    Seated knee flex off EOB with use off opposite extremity for overpressure  Prone hang 10\"    5 min 10    5#     SLR  ABD 1# 2  1# 3 15  10     BS  Bridges  Calf stretch incline  Calf raises  Leg press  SL  IRIS- ABD            EXT  TKE  Step ups/Lat  Hamstring curls- single leg  Knee ext- single leg  SLS 10\"    30s  30sx3  130#  90#  55#  55#  100#  6 in  45#  35#  10s   10x  20x  3  3x10  3x10  3x10  3x10ea  3x10ea  30L  30x ea  3x10  3x10  10     3\" holds                      Foam, vc        4 week orthovitals due 5/23                  Pt education 10 min  Reviewed precautions, HEP with gradual progression, goals of PT, importance of early compliance with HEP for ROM, RICE principles- pt stated understanding    Manual Intervention (56762)       Sup/inf patella mobs, AP jt mobs 8 min                                         NMR re-education (19823)   CUES NEEDED    NMR with quad sets                                                               Therapeutic Activity (13075)                     VASO  10'                   [facility] access code:  [email or web address]           Therapeutic Exercise and NMR EXR  [x] (90771) Provided verbal/tactile cueing for activities related to strengthening, flexibility, endurance, ROM for improvements in LE, proximal hip, and core control with self care, mobility, lifting, ambulation. [x] (25676) Provided verbal/tactile cueing for activities related to improving balance, coordination, kinesthetic sense, posture, motor skill, proprioception to assist with LE, proximal hip, and core control in self-care, mobility, lifting, ambulation and eccentric single leg control.      NMR and Therapeutic Activities:    [x] (47210 or 69751) Provided verbal/tactile cueing for activities related to improving balance, coordination, kinesthetic sense, posture, motor skill, proprioception and motor activation to allow for proper function of core, proximal hip and LE with self-care and ADLs and functional mobility.   [] (60762) Gait Re-education- Provided training and instruction to the patient for proper LE, core and proximal hip recruitment and positioning and eccentric body weight control with ambulation re-education including up and down stairs     Home Exercise Program:    [x] (46338) Reviewed/Progressed HEP activities related to strengthening, flexibility, endurance, ROM of core, proximal hip and LE for functional self-care, mobility, lifting and ambulation/stair navigation   [] (10201) Reviewed/Progressed HEP activities related to improving balance, coordination, kinesthetic sense, posture, motor skill, proprioception of core, proximal hip and LE for self-care, mobility, lifting, and ambulation/stair navigation      Manual Treatments:  PROM / STM / Oscillations-Mobs:  G-I, II, III, IV (PA's, Inf., Post.)  [x] (37649) Provided manual therapy to mobilize LE, proximal hip and/or LS spine soft tissue/joints for the purpose of modulating pain, promoting relaxation, increasing ROM, reducing/eliminating soft tissue swelling/inflammation/restriction, improving soft tissue extensibility and allowing for proper ROM for normal function with self-care, mobility, lifting and ambulation. Modalities:     [x] GAME READY (VASO)- for significant edema, swelling, pain control.      Charges:  Timed Code Treatment Minutes: 55   Total Treatment Minutes:  65   BWC:  TE TIME:  NMR TIME:  MANUAL TIME:  UNTIMED MINUTES:  Medicare Total:         [] EVAL (LOW) 30625 (typically 20 minutes face-to-face)  [] EVAL (MOD) 56741 (typically 30 minutes face-to-face)  [] EVAL (HIGH) 34575 (typically 45 minutes face-to-face)  [] RE-EVAL     [x] ZE(49161) x   2  [] IONTO  [x] NMR (91771) x  1   [x] VASO  [] Manual (91306) x     [] Other:  [x] TA x    1   [] [facility] Traction (02330)  [] ES(attended) (20403)      [] ES (un) (26536):    ASSESSMENT:  ROM gradually increasing. No complaints at conclusion. Educated to continue focus on ROM especially on full extension. Added machines without issues. Starting to add some strength to HEP with good understanding. GOALS:    Patient stated goal: Walking without pain. Therapist goals for Patient:   Short Term Goals: To be achieved in: 2 weeks  1. Independent in HEP and progression per patient tolerance, in order to prevent re-injury. [x] Progressing: [] Met: [] Not Met: [] Adjusted     2. Patient will have a decrease in pain to facilitate improvement in movement, function, and ADLs as indicated by Functional Deficits. [x] Progressing: [] Met: [] Not Met: [] Adjusted     Long Term Goals: To be achieved in: 12 weeks  1. Disability index score of 50/80 or better for the LEFS to assist with reaching prior level of function. [x] Progressing: [] Met: [] Not Met: [] Adjusted     2. Patient will demonstrate increased AROM to 0-120 to allow for proper joint functioning as indicated by patients Functional Deficits. [x] Progressing: [] Met: [] Not Met: [] Adjusted     3. Patient will demonstrate an increase in Strength to good proximal hip strength and control, within 5lb HHD in LE to allow for proper functional mobility as indicated by patients Functional Deficits. [x] Progressing: [] Met: [] Not Met: [] Adjusted     4. Patient will return to functional activities including standing/walking 20-30 mins I no AD without increased symptoms or restriction. [x] Progressing: [] Met: [] Not Met: [] Adjusted     5.  Patient will ascend/descend 1 flight of steps I no AD (patient specific functional goal)    [x] Progressing: [] Met: [] Not Met: [] Adjusted         Overall Progression Towards Functional goals/ Treatment Progress Update:  [x] Patient is progressing as expected towards functional goals listed. [] Progression is slowed due to complexities/Impairments listed. [] Progression has been slowed due to co-morbidities. [] Plan just implemented, too soon to assess goals progression <30days   [] Goals require adjustment due to lack of progress  [] Patient is not progressing as expected and requires additional follow up with physician  [] Other    Prognosis for POC: [x] Good [] Fair  [] Poor      Patient requires continued skilled intervention: [x] Yes  [] No    Treatment/Activity Tolerance:  [x] Patient able to complete treatment  [] Patient limited by fatigue  [] Patient limited by pain    [] Patient limited by other medical complications  [] Other:     Return to Play: (if applicable)   []  Stage 1: Intro to Strength   []  Stage 2: Return to Run and Strength   []  Stage 3: Return to Jump and Strength   []  Stage 4: Dynamic Strength and Agility   []  Stage 5: Sport Specific Training     []  Ready to Return to Play, Meets All Above Stages   []  Not Ready for Return to Sports   Comments:                          PLAN:   [x] Continue per plan of care [] Alter current plan (see comments above)  [] Plan of care initiated [] Hold pending MD visit [] Discharge    Electronically signed by:  Vanda Galicia, PT, DPT  Note: If patient does not return for scheduled/ recommended follow up visits, this note will serve as a discharge from care along with most recent update on progress.

## 2022-05-09 ENCOUNTER — HOSPITAL ENCOUNTER (OUTPATIENT)
Dept: PHYSICAL THERAPY | Age: 67
Setting detail: THERAPIES SERIES
Discharge: HOME | End: 2022-05-09
Payer: COMMERCIAL

## 2022-05-09 PROCEDURE — 97112 NEUROMUSCULAR REEDUCATION: CPT

## 2022-05-09 PROCEDURE — 97530 THERAPEUTIC ACTIVITIES: CPT

## 2022-05-09 PROCEDURE — 97016 VASOPNEUMATIC DEVICE THERAPY: CPT

## 2022-05-09 PROCEDURE — 97110 THERAPEUTIC EXERCISES: CPT

## 2022-05-09 NOTE — FLOWSHEET NOTE
[facility]23 [facility] and [address]  Phone: [phone]   Fax:     [phone]      Physical Therapy Treatment Note/ Progress Report:     Date:  2022    Patient Name:  Jeanie Robert    :  1955  MRN: [id number]  Restrictions/Precautions:    Medical/Treatment Diagnosis Information:  · Diagnosis: s/p L TKA C28.325 sx 3/28/22  · Treatment Diagnosis: Left knee pain M25. 595  Insurance/Certification information:  PT Insurance Information: Richa  Physician Information:  Referring Practitioner: Dr. Michael Rojas  Has the plan of care been signed (Y/N):        []  Yes  [x]  No     Date of Patient follow up with Physician: 22    Is this a Progress Report:     []  Yes  [x]  No      If Yes:  Date Range for reporting period:  Beginning: 3/30/22 ------------ Endin22    Progress report will be due (10 Rx or 30 days whichever is less):       Recertification will be due (POC Duration  / 90 days whichever is less): 22        Visit # Insurance Allowable Auth Required   In Person 13 30 (1 used) []  Yes     []  No    Tele Health 0  []  Yes     []  No    Total 13       Functional Scale: LEFS 21.5/80   Date assessed:  3/30/22     Latex Allergy:  [x]NO      []YES  Preferred Language for Healthcare:   [x]English       []other:    Pain level: 2/10     SUBJECTIVE:  Pt stated feels a little improvement every day. OBJECTIVE:  0 (after manual therapy) - 126 ° of flexion sheet pulls   Observation:    Test measurements:            RESTRICTIONS/PRECAUTIONS:  s/p L TKA 3/28/22; hx R RUBY  Spoke with MA can change dressing at  appt    [9:28 AM] Evelyn Alberta Sofia Calender,    [9:29 AM] Eveyln Pinzon  Patient today of Dr. Michael Rojas that does not see the MD until . He has a pain pump.   Does the pain pump stay in until he follows up with MD?    [9:30 AM] Piper Montez, patient is to pull the pump once empty [9:31 AM] Sobeida clark thanks           Exercises/Interventions:   Therapeutic Ex (57750) Sets/sec Reps Notes/CUES HEP   Bike ROM  Heel prop 8 min  5#   3 min ROM    Calf stretch belt 30s 5     HS stretch long 30s 5     Quad sets 10s 10     Heel slides   PT assist    Seated knee flex off EOB with use off opposite extremity for overpressure  Prone hang 10\"    5 min 10    5#     SLR  ABD 1# 2  1# 3 15  10     BS  Bridges  Calf stretch incline  Calf raises  Leg press  SL  IRIS- ABD            EXT  TKE  Step ups/Lat  Hamstring curls- single leg  Knee ext- single leg  SLS 10\"    30s  30sx3  140#  90#  55#  55#  100#  6 in  45#  45#  10s   10x  20x  3  3x10  3x10  3x10  3x10ea  3x10ea  30L  30x ea  3x10  3x10  10     3\" holds                      Foam, vc        4 week orthovitals due 5/23                  Pt education 10 min  Reviewed precautions, HEP with gradual progression, goals of PT, importance of early compliance with HEP for ROM, RICE principles- pt stated understanding    Manual Intervention (75757)       Sup/inf patella mobs, AP jt mobs 8 min                                         NMR re-education (63141)   CUES NEEDED    NMR with quad sets                                                               Therapeutic Activity (51895)                     VASO  10'                   [facility] access code:  [email or web address]           Therapeutic Exercise and NMR EXR  [x] (18478) Provided verbal/tactile cueing for activities related to strengthening, flexibility, endurance, ROM for improvements in LE, proximal hip, and core control with self care, mobility, lifting, ambulation. [x] (76447) Provided verbal/tactile cueing for activities related to improving balance, coordination, kinesthetic sense, posture, motor skill, proprioception to assist with LE, proximal hip, and core control in self-care, mobility, lifting, ambulation and eccentric single leg control.      NMR and Therapeutic Activities:    [x] (09381 or 15044) Provided verbal/tactile cueing for activities related to improving balance, coordination, kinesthetic sense, posture, motor skill, proprioception and motor activation to allow for proper function of core, proximal hip and LE with self-care and ADLs and functional mobility.   [] (02519) Gait Re-education- Provided training and instruction to the patient for proper LE, core and proximal hip recruitment and positioning and eccentric body weight control with ambulation re-education including up and down stairs     Home Exercise Program:    [x] (04904) Reviewed/Progressed HEP activities related to strengthening, flexibility, endurance, ROM of core, proximal hip and LE for functional self-care, mobility, lifting and ambulation/stair navigation   [] (19921) Reviewed/Progressed HEP activities related to improving balance, coordination, kinesthetic sense, posture, motor skill, proprioception of core, proximal hip and LE for self-care, mobility, lifting, and ambulation/stair navigation      Manual Treatments:  PROM / STM / Oscillations-Mobs:  G-I, II, III, IV (PA's, Inf., Post.)  [x] (13744) Provided manual therapy to mobilize LE, proximal hip and/or LS spine soft tissue/joints for the purpose of modulating pain, promoting relaxation, increasing ROM, reducing/eliminating soft tissue swelling/inflammation/restriction, improving soft tissue extensibility and allowing for proper ROM for normal function with self-care, mobility, lifting and ambulation. Modalities:     [x] GAME READY (VASO)- for significant edema, swelling, pain control.      Charges:  Timed Code Treatment Minutes: 53   Total Treatment Minutes:  63   BWC:  TE TIME:  NMR TIME:  MANUAL TIME:  UNTIMED MINUTES:  Medicare Total:         [] EVAL (LOW) 49794 (typically 20 minutes face-to-face)  [] EVAL (MOD) 79052 (typically 30 minutes face-to-face)  [] EVAL (HIGH) 58639 (typically 45 minutes face-to-face)  [] RE-EVAL     [x] YB(97168) x   2  [] IONTO  [x] NMR (82014) x  1   [x] VASO  [] Manual (10704) x     [] Other:  [x] TA x    1   [] Mech Traction (25655)  [] ES(attended) (90943)      [] ES (un) (84945):    ASSESSMENT:  ROM gradually increasing. No complaints at conclusion. Educated to continue focus on ROM especially on full extension. Gradually progressing machines without issues. Starting to add some strength to HEP with good understanding. Pt stated feels that he is progressing with improved function. GOALS:    Patient stated goal: Walking without pain. Therapist goals for Patient:   Short Term Goals: To be achieved in: 2 weeks  1. Independent in HEP and progression per patient tolerance, in order to prevent re-injury. [x] Progressing: [] Met: [] Not Met: [] Adjusted     2. Patient will have a decrease in pain to facilitate improvement in movement, function, and ADLs as indicated by Functional Deficits. [x] Progressing: [] Met: [] Not Met: [] Adjusted     Long Term Goals: To be achieved in: 12 weeks  1. Disability index score of 50/80 or better for the LEFS to assist with reaching prior level of function. [x] Progressing: [] Met: [] Not Met: [] Adjusted     2. Patient will demonstrate increased AROM to 0-120 to allow for proper joint functioning as indicated by patients Functional Deficits. [x] Progressing: [] Met: [] Not Met: [] Adjusted     3. Patient will demonstrate an increase in Strength to good proximal hip strength and control, within 5lb HHD in LE to allow for proper functional mobility as indicated by patients Functional Deficits. [x] Progressing: [] Met: [] Not Met: [] Adjusted     4. Patient will return to functional activities including standing/walking 20-30 mins I no AD without increased symptoms or restriction. [x] Progressing: [] Met: [] Not Met: [] Adjusted     5.  Patient will ascend/descend 1 flight of steps I no AD (patient specific functional goal)    [x] Progressing: [] Met: [] Not Met: [] Adjusted         Overall Progression Towards Functional goals/ Treatment Progress Update:  [x] Patient is progressing as expected towards functional goals listed. [] Progression is slowed due to complexities/Impairments listed. [] Progression has been slowed due to co-morbidities. [] Plan just implemented, too soon to assess goals progression <30days   [] Goals require adjustment due to lack of progress  [] Patient is not progressing as expected and requires additional follow up with physician  [] Other    Prognosis for POC: [x] Good [] Fair  [] Poor      Patient requires continued skilled intervention: [x] Yes  [] No    Treatment/Activity Tolerance:  [x] Patient able to complete treatment  [] Patient limited by fatigue  [] Patient limited by pain    [] Patient limited by other medical complications  [] Other:     Return to Play: (if applicable)   []  Stage 1: Intro to Strength   []  Stage 2: Return to Run and Strength   []  Stage 3: Return to Jump and Strength   []  Stage 4: Dynamic Strength and Agility   []  Stage 5: Sport Specific Training     []  Ready to Return to Play, Meets All Above Stages   []  Not Ready for Return to Sports   Comments:                          PLAN:   [x] Continue per plan of care [] Alter current plan (see comments above)  [] Plan of care initiated [] Hold pending MD visit [] Discharge    Electronically signed by:  Gene Shine, PT, DPT  Note: If patient does not return for scheduled/ recommended follow up visits, this note will serve as a discharge from care along with most recent update on progress.

## 2022-05-11 ENCOUNTER — TELEPHONE (OUTPATIENT)
Dept: ORTHOPEDIC SURGERY | Age: 67
End: 2022-05-11

## 2022-05-11 ENCOUNTER — HOSPITAL ENCOUNTER (OUTPATIENT)
Dept: PHYSICAL THERAPY | Age: 67
Setting detail: THERAPIES SERIES
Discharge: HOME | End: 2022-05-11
Payer: COMMERCIAL

## 2022-05-11 PROCEDURE — 97530 THERAPEUTIC ACTIVITIES: CPT

## 2022-05-11 PROCEDURE — 97016 VASOPNEUMATIC DEVICE THERAPY: CPT

## 2022-05-11 PROCEDURE — 97110 THERAPEUTIC EXERCISES: CPT

## 2022-05-11 PROCEDURE — 97112 NEUROMUSCULAR REEDUCATION: CPT

## 2022-05-11 NOTE — TELEPHONE ENCOUNTER
Faxed medical records [place]) 3/28/2022 to current to [facility] with [employer] @ [phone]

## 2022-05-11 NOTE — FLOWSHEET NOTE
723 [facility] and 500 [facility], [address] [place] [address], [address] [place] [address] [address]  Phone: [phone]   Fax:     [phone]      Physical Therapy Treatment Note/ Progress Report:     Date:  2022    Patient Name:  Gerri Weinstein    :  1955  MRN: [id number]  Restrictions/Precautions:    Medical/Treatment Diagnosis Information:  · Diagnosis: s/p L TKA O88.966 sx 3/28/22  · Treatment Diagnosis: Left knee pain M25. 371  Insurance/Certification information:  PT Insurance Information: Richa  Physician Information:  Referring Practitioner: Dr. Rosita Zapata  Has the plan of care been signed (Y/N):        []  Yes  [x]  No     Date of Patient follow up with Physician: 22    Is this a Progress Report:     []  Yes  [x]  No      If Yes:  Date Range for reporting period:  Beginning: 3/30/22 ------------ Endin22    Progress report will be due (10 Rx or 30 days whichever is less):       Recertification will be due (POC Duration  / 90 days whichever is less): 22        Visit # Insurance Allowable Auth Required   In Person 14 30 (1 used) []  Yes     []  No    Tele Health 0  []  Yes     []  No    Total 14       Functional Scale: LEFS 21.5/80   Date assessed:  3/30/22     Latex Allergy:  [x]NO      []YES  Preferred Language for Healthcare:   [x]English       []other:    Pain level: 2/10     SUBJECTIVE:  Pt stated feels a little improvement every day. OBJECTIVE:  0 (after manual therapy) - 128 ° of flexion sheet pulls   Observation:    Test measurements:            RESTRICTIONS/PRECAUTIONS:  s/p L TKA 3/28/22; hx R RUBY  Spoke with MA can change dressing at  appt    [9:28 AM] David Harvey,    [9:29 AM] David Silver  Patient today of Dr. Rosita Zapata that does not see the MD until . He has a pain pump.   Does the pain pump stay in until he follows up with MD?    [9:30 AM] Sandra Montez, patient is to pull the pump once empty [9:31 AM] Holly Pham  ok thanks           Exercises/Interventions:   Therapeutic Ex (38475) Sets/sec Reps Notes/CUES HEP   Bike ROM  Heel prop 8 min  5#   3 min ROM    Calf stretch belt 30s 5     HS stretch long 30s 5     Quad sets 10s 10     Heel slides   PT assist    Seated knee flex off EOB with use off opposite extremity for overpressure  Prone hang 10\"    5 min 10    5#     SLR  ABD 1# 2  1# 3 15  10     BS  Bridges  Calf stretch incline  Calf raises  Leg press  SL  IRIS- ABD            EXT  TKE  Step ups/Lat  Hamstring curls- single leg  Knee ext- single leg  SLS  Lateral band walk 10\"    30s  30sx3  140#  90#  60#  60#  105#  6 in  45#  45#  10s  Lime 10x  20x  3  3x10  3x10  3x10  3x10ea  3x10ea  30L  30x ea  3x10  3x10  10  10 plinths   3\" holds                      Foam, vc        4 week orthovitals due 5/23                  Pt education 10 min  Reviewed precautions, HEP with gradual progression, goals of PT, importance of early compliance with HEP for ROM, RICE principles- pt stated understanding    Manual Intervention (84029)       Sup/inf patella mobs, AP jt mobs 8 min                                         NMR re-education (39098)   CUES NEEDED    NMR with quad sets                                                               Therapeutic Activity (31996)                     VASO  10'                   [employer] access code:  [email or web address]           Therapeutic Exercise and NMR EXR  [x] (69989) Provided verbal/tactile cueing for activities related to strengthening, flexibility, endurance, ROM for improvements in LE, proximal hip, and core control with self care, mobility, lifting, ambulation. [x] (19687) Provided verbal/tactile cueing for activities related to improving balance, coordination, kinesthetic sense, posture, motor skill, proprioception to assist with LE, proximal hip, and core control in self-care, mobility, lifting, ambulation and eccentric single leg control.      NMR and Therapeutic Activities:    [x] (18131 or 61218) Provided verbal/tactile cueing for activities related to improving balance, coordination, kinesthetic sense, posture, motor skill, proprioception and motor activation to allow for proper function of core, proximal hip and LE with self-care and ADLs and functional mobility.   [] (33494) Gait Re-education- Provided training and instruction to the patient for proper LE, core and proximal hip recruitment and positioning and eccentric body weight control with ambulation re-education including up and down stairs     Home Exercise Program:    [x] (45910) Reviewed/Progressed HEP activities related to strengthening, flexibility, endurance, ROM of core, proximal hip and LE for functional self-care, mobility, lifting and ambulation/stair navigation   [] (35040) Reviewed/Progressed HEP activities related to improving balance, coordination, kinesthetic sense, posture, motor skill, proprioception of core, proximal hip and LE for self-care, mobility, lifting, and ambulation/stair navigation      Manual Treatments:  PROM / STM / Oscillations-Mobs:  G-I, II, III, IV (PA's, Inf., Post.)  [x] (19185) Provided manual therapy to mobilize LE, proximal hip and/or LS spine soft tissue/joints for the purpose of modulating pain, promoting relaxation, increasing ROM, reducing/eliminating soft tissue swelling/inflammation/restriction, improving soft tissue extensibility and allowing for proper ROM for normal function with self-care, mobility, lifting and ambulation. Modalities:     [x] GAME READY (VASO)- for significant edema, swelling, pain control.      Charges:  Timed Code Treatment Minutes: 55   Total Treatment Minutes:  65   BWC:  TE TIME:  NMR TIME:  MANUAL TIME:  UNTIMED MINUTES:  Medicare Total:         [] EVAL (LOW) 93208 (typically 20 minutes face-to-face)  [] EVAL (MOD) 46607 (typically 30 minutes face-to-face)  [] EVAL (HIGH) 53749 (typically 45 minutes face-to-face)  [] RE-EVAL     [x] HX(05234) x   2  [] IONTO  [x] NMR (09740) x  1   [x] VASO  [] Manual (01141) x     [] Other:  [x] TA x    1   [] Mech Traction (34662)  [] ES(attended) (98818)      [] ES (un) (92742):    ASSESSMENT:  ROM, strength and function gradually increasing. No complaints at conclusion. Educated to continue focus on ROM especially on full extension. Gradually progressing machines without issues. Starting to add some strength to HEP with good understanding. Pt stated feels that he is progressing with improved function. GOALS:    Patient stated goal: Walking without pain. Therapist goals for Patient:   Short Term Goals: To be achieved in: 2 weeks  1. Independent in HEP and progression per patient tolerance, in order to prevent re-injury. [x] Progressing: [] Met: [] Not Met: [] Adjusted     2. Patient will have a decrease in pain to facilitate improvement in movement, function, and ADLs as indicated by Functional Deficits. [x] Progressing: [] Met: [] Not Met: [] Adjusted     Long Term Goals: To be achieved in: 12 weeks  1. Disability index score of 50/80 or better for the LEFS to assist with reaching prior level of function. [x] Progressing: [] Met: [] Not Met: [] Adjusted     2. Patient will demonstrate increased AROM to 0-120 to allow for proper joint functioning as indicated by patients Functional Deficits. [x] Progressing: [] Met: [] Not Met: [] Adjusted     3. Patient will demonstrate an increase in Strength to good proximal hip strength and control, within 5lb HHD in LE to allow for proper functional mobility as indicated by patients Functional Deficits. [x] Progressing: [] Met: [] Not Met: [] Adjusted     4. Patient will return to functional activities including standing/walking 20-30 mins I no AD without increased symptoms or restriction. [x] Progressing: [] Met: [] Not Met: [] Adjusted     5.  Patient will ascend/descend 1 flight of steps I no AD (patient specific functional goal)    [x] Progressing: [] Met: [] Not Met: [] Adjusted         Overall Progression Towards Functional goals/ Treatment Progress Update:  [x] Patient is progressing as expected towards functional goals listed. [] Progression is slowed due to complexities/Impairments listed. [] Progression has been slowed due to co-morbidities. [] Plan just implemented, too soon to assess goals progression <30days   [] Goals require adjustment due to lack of progress  [] Patient is not progressing as expected and requires additional follow up with physician  [] Other    Prognosis for POC: [x] Good [] Fair  [] Poor      Patient requires continued skilled intervention: [x] Yes  [] No    Treatment/Activity Tolerance:  [x] Patient able to complete treatment  [] Patient limited by fatigue  [] Patient limited by pain    [] Patient limited by other medical complications  [] Other:     Return to Play: (if applicable)   []  Stage 1: Intro to Strength   []  Stage 2: Return to Run and Strength   []  Stage 3: Return to Jump and Strength   []  Stage 4: Dynamic Strength and Agility   []  Stage 5: Sport Specific Training     []  Ready to Return to Play, Meets All Above Stages   []  Not Ready for Return to Sports   Comments:                          PLAN:   [x] Continue per plan of care [] Alter current plan (see comments above)  [] Plan of care initiated [] Hold pending MD visit [] Discharge    Electronically signed by:  Yolanda Ng, PT, DPT  Note: If patient does not return for scheduled/ recommended follow up visits, this note will serve as a discharge from care along with most recent update on progress.

## 2022-05-12 ENCOUNTER — OFFICE VISIT (OUTPATIENT)
Dept: ORTHOPEDIC SURGERY | Age: 67
End: 2022-05-12

## 2022-05-12 VITALS — BODY MASS INDEX: 27.59 KG/M2 | HEIGHT: 74 IN | WEIGHT: 215 LBS

## 2022-05-12 DIAGNOSIS — Z96.652 S/P TKR (TOTAL KNEE REPLACEMENT), LEFT: Primary | ICD-10-CM

## 2022-05-12 PROCEDURE — 99024 POSTOP FOLLOW-UP VISIT: CPT | Performed by: PHYSICIAN ASSISTANT

## 2022-05-12 NOTE — PROGRESS NOTES
Dr Lito Cartagena      Date /Time 5/12/2022       9:50 AM EDT  Name Carissa Cunningham             1955   Location  [facility]  MRN [id number]                Chief Complaint   Patient presents with    Post-Op Check     LEFT TKA 3/28/22        History of Present Illness      Carissa Cunningham is a 77 y.o. male is here for post-op visit after LEFT  63587 Total Knee Arthroplasty    He is now 6-week status post left total knee replacement, doing quite well. He has no complaints. He is doing well with therapy    Physical Exam    Based off 1997 Exam Criteria    Ht 6' 2\" (1.88 m)   Wt 215 lb (97.5 kg)   BMI 27.60 kg/m²      Constitutional:       General: He is not in acute distress. Appearance: Normal appearance. LEFT Knee: incision clean, intact, healing appropriately. No surrounding  erythema or fluctuance. Neuro intact distal. No evidence of DVT. Range of motion 0 to 120 degrees, stable exam    Imaging       X-rays were ordered today and reviewed of the left knee. 3 views. AP, lateral, and skyline views. They demonstrate a left total knee arthroplasty in good position. No evidence of loosening or periprosthetic fracture. Assessment and Plan    Cyndi De Jesus was seen today for post-op check. Diagnoses and all orders for this visit:    S/P TKR (total knee replacement), left  -     XR KNEE LEFT (3 VIEWS); Future      Patient is doing well. Patient will continue with physical therapy. We will extend his off work note until 6/6/2022 and he will return to work full duty at that time. He will see Dr. Eh Chun back in 3 months or sooner if problems arise. Electronically signed by Marco Antonio Chavira PA-C on 5/12/2022 at 8:42 AM  This dictation was generated by voice recognition computer software. Although all attempts are made to edit the dictation for accuracy, there may be errors in the transcription that are not intended.

## 2022-05-12 NOTE — LETTER
[address] [place] [address] Dr Paulo Rahman [facility] [address]  Phone: [phone]  Fax: [phone] MD Braden        May 12, 2022     Patient: Severo Barley   YOB: 1955   Date of Visit: 5/12/2022       To Whom It May Concern: It is my medical opinion that Divya Serrato may return to work on 6/6/22 with NO restrictions. If you have any questions or concerns, please don't hesitate to call.     Sincerely,    MD Brayan Diehl MD

## 2022-05-16 ENCOUNTER — HOSPITAL ENCOUNTER (OUTPATIENT)
Dept: PHYSICAL THERAPY | Age: 67
Setting detail: THERAPIES SERIES
Discharge: HOME | End: 2022-05-16
Payer: COMMERCIAL

## 2022-05-16 PROCEDURE — 97112 NEUROMUSCULAR REEDUCATION: CPT

## 2022-05-16 PROCEDURE — 97016 VASOPNEUMATIC DEVICE THERAPY: CPT

## 2022-05-16 PROCEDURE — 97530 THERAPEUTIC ACTIVITIES: CPT

## 2022-05-16 PROCEDURE — 97110 THERAPEUTIC EXERCISES: CPT

## 2022-05-16 NOTE — FLOWSHEET NOTE
723 [facility] and 500 [facility], [address] [place] [address], [address] [place] [address] [address]  Phone: [phone]   Fax:     [phone]      Physical Therapy Treatment Note/ Progress Report:     Date:  2022    Patient Name:  Ya Gates    :  1955  MRN: [id number]  Restrictions/Precautions:    Medical/Treatment Diagnosis Information:  · Diagnosis: s/p L TKA A00.254 sx 3/28/22  · Treatment Diagnosis: Left knee pain M25. 844  Insurance/Certification information:  PT Insurance Information: Richa  Physician Information:  Referring Practitioner: Dr. Deena Kang  Has the plan of care been signed (Y/N):        []  Yes  [x]  No     Date of Patient follow up with Physician: 22    Is this a Progress Report:     []  Yes  [x]  No      If Yes:  Date Range for reporting period:  Beginning: 3/30/22 ------------ Endin22    Progress report will be due (10 Rx or 30 days whichever is less): 77      Recertification will be due (POC Duration  / 90 days whichever is less): 22        Visit # Insurance Allowable Auth Required   In Person 15 30 (1 used) []  Yes     []  No    Tele Health 0  []  Yes     []  No    Total 15       Functional Scale: LEFS 21.5/80   Date assessed:  3/30/22     Latex Allergy:  [x]NO      []YES  Preferred Language for Healthcare:   [x]English       []other:    Pain level: 2/10     SUBJECTIVE:  Pt stated continues to feel good, saw PA who is pleased with progress. Plans to RTW 22. OBJECTIVE:  0 (after manual therapy) - 128 ° of flexion sheet pulls   Observation:    Test measurements:            RESTRICTIONS/PRECAUTIONS:  s/p L TKA 3/28/22; hx R RUBY  Spoke with MA can change dressing at  appt    [9:28 AM] [place]  Maciel Queen,    [9:29 AM] [place]  Patient today of Dr. Deena Kang that does not see the MD until . He has a pain pump.   Does the pain pump stay in until he follows up with MD?    [9:30 AM] Melida Johann Martines, patient is to pull the pump once empty     [9:31 AM] David Silver  ok thanks           Exercises/Interventions:   Therapeutic Ex (79942) Sets/sec Reps Notes/CUES HEP   Bike ROM  Heel prop 8 min  5#   3 min ROM    Calf stretch belt 30s 5     HS stretch long 30s 5     Quad sets 10s 10     Heel slides   PT assist    Seated knee flex off EOB with use off opposite extremity for overpressure  Prone hang 10\"    5 min 10    5#     SLR  ABD 1# 2  1# 3 15  10     BS  Bridges  Calf stretch incline  Calf raises  Leg press  SL  IRIS- ABD            EXT  TKE  Step ups/Lat  Hamstring curls- single leg  Knee ext- single leg  SLS  Lateral band walk  Zig zag walk 10\"    30s  30sx3  140#  90#  60#  75#  105#  8 in  50#  50#  10s  Lime  Lime 10x  20x  3  3x10  3x10  3x10  3x10ea  3x10ea  30L  30x ea  3x10  3x10  10  5 plinths  5 plinths   3\" holds                      Foam, vc        4 week orthovitals due 5/23                  Pt education 10 min  Reviewed precautions, HEP with gradual progression, goals of PT, importance of early compliance with HEP for ROM, RICE principles- pt stated understanding    Manual Intervention (65757)       Sup/inf patella mobs, AP jt mobs 8 min                                         NMR re-education (06628)   CUES NEEDED    NMR with quad sets                                                               Therapeutic Activity (50486)                     VASO  10'                   [facility] access code:  [email or web address]           Therapeutic Exercise and NMR EXR  [x] (25214) Provided verbal/tactile cueing for activities related to strengthening, flexibility, endurance, ROM for improvements in LE, proximal hip, and core control with self care, mobility, lifting, ambulation.   [x] (66993) Provided verbal/tactile cueing for activities related to improving balance, coordination, kinesthetic sense, posture, motor skill, proprioception to assist with LE, proximal hip, and core control in self-care, mobility, lifting, ambulation and eccentric single leg control. NMR and Therapeutic Activities:    [x] (87085 or 10609) Provided verbal/tactile cueing for activities related to improving balance, coordination, kinesthetic sense, posture, motor skill, proprioception and motor activation to allow for proper function of core, proximal hip and LE with self-care and ADLs and functional mobility.   [] (43939) Gait Re-education- Provided training and instruction to the patient for proper LE, core and proximal hip recruitment and positioning and eccentric body weight control with ambulation re-education including up and down stairs     Home Exercise Program:    [x] (16985) Reviewed/Progressed HEP activities related to strengthening, flexibility, endurance, ROM of core, proximal hip and LE for functional self-care, mobility, lifting and ambulation/stair navigation   [] (88860) Reviewed/Progressed HEP activities related to improving balance, coordination, kinesthetic sense, posture, motor skill, proprioception of core, proximal hip and LE for self-care, mobility, lifting, and ambulation/stair navigation      Manual Treatments:  PROM / STM / Oscillations-Mobs:  G-I, II, III, IV (PA's, Inf., Post.)  [x] (83131) Provided manual therapy to mobilize LE, proximal hip and/or LS spine soft tissue/joints for the purpose of modulating pain, promoting relaxation, increasing ROM, reducing/eliminating soft tissue swelling/inflammation/restriction, improving soft tissue extensibility and allowing for proper ROM for normal function with self-care, mobility, lifting and ambulation. Modalities:     [x] GAME READY (VASO)- for significant edema, swelling, pain control.      Charges:  Timed Code Treatment Minutes: 55   Total Treatment Minutes:  65   BWC:  TE TIME:  NMR TIME:  MANUAL TIME:  UNTIMED MINUTES:  Medicare Total:         [] EVAL (LOW) 03808 (typically 20 minutes face-to-face)  [] EVAL (MOD) 74754 (typically 30 minutes face-to-face)  [] EVAL (HIGH) 83637 (typically 45 minutes face-to-face)  [] RE-EVAL     [x] PF(78479) x   2  [] IONTO  [x] NMR (52579) x  1   [x] VASO  [] Manual (68898) x     [] Other:  [x] TA x    1   [] Mech Traction (16971)  [] ES(attended) (89204)      [] ES (un) (38883):    ASSESSMENT:  ROM, strength and function gradually increasing. No complaints at conclusion. Educated to continue focus on ROM especially on full extension. Gradually progressing machines without issues. Starting to add some strength to HEP with good understanding. Pt stated feels that he is progressing with improved function. GOALS:    Patient stated goal: Walking without pain. Therapist goals for Patient:   Short Term Goals: To be achieved in: 2 weeks  1. Independent in HEP and progression per patient tolerance, in order to prevent re-injury. [x] Progressing: [] Met: [] Not Met: [] Adjusted     2. Patient will have a decrease in pain to facilitate improvement in movement, function, and ADLs as indicated by Functional Deficits. [x] Progressing: [] Met: [] Not Met: [] Adjusted     Long Term Goals: To be achieved in: 12 weeks  1. Disability index score of 50/80 or better for the LEFS to assist with reaching prior level of function. [x] Progressing: [] Met: [] Not Met: [] Adjusted     2. Patient will demonstrate increased AROM to 0-120 to allow for proper joint functioning as indicated by patients Functional Deficits. [x] Progressing: [] Met: [] Not Met: [] Adjusted     3. Patient will demonstrate an increase in Strength to good proximal hip strength and control, within 5lb HHD in LE to allow for proper functional mobility as indicated by patients Functional Deficits. [x] Progressing: [] Met: [] Not Met: [] Adjusted     4. Patient will return to functional activities including standing/walking 20-30 mins I no AD without increased symptoms or restriction. [x] Progressing: [] Met: [] Not Met: [] Adjusted     5.  Patient will ascend/descend 1 flight of steps I no AD (patient specific functional goal)    [x] Progressing: [] Met: [] Not Met: [] Adjusted         Overall Progression Towards Functional goals/ Treatment Progress Update:  [x] Patient is progressing as expected towards functional goals listed. [] Progression is slowed due to complexities/Impairments listed. [] Progression has been slowed due to co-morbidities. [] Plan just implemented, too soon to assess goals progression <30days   [] Goals require adjustment due to lack of progress  [] Patient is not progressing as expected and requires additional follow up with physician  [] Other    Prognosis for POC: [x] Good [] Fair  [] Poor      Patient requires continued skilled intervention: [x] Yes  [] No    Treatment/Activity Tolerance:  [x] Patient able to complete treatment  [] Patient limited by fatigue  [] Patient limited by pain    [] Patient limited by other medical complications  [] Other:     Return to Play: (if applicable)   []  Stage 1: Intro to Strength   []  Stage 2: Return to Run and Strength   []  Stage 3: Return to Jump and Strength   []  Stage 4: Dynamic Strength and Agility   []  Stage 5: Sport Specific Training     []  Ready to Return to Play, Meets All Above Stages   []  Not Ready for Return to Sports   Comments:                          PLAN:   [x] Continue per plan of care [] Alter current plan (see comments above)  [] Plan of care initiated [] Hold pending MD visit [] Discharge    Electronically signed by:  Kumar Grant PT, DPT  Note: If patient does not return for scheduled/ recommended follow up visits, this note will serve as a discharge from care along with most recent update on progress.

## 2022-05-18 ENCOUNTER — HOSPITAL ENCOUNTER (OUTPATIENT)
Dept: PHYSICAL THERAPY | Age: 67
Setting detail: THERAPIES SERIES
Discharge: HOME | End: 2022-05-18
Payer: COMMERCIAL

## 2022-05-18 PROCEDURE — 97112 NEUROMUSCULAR REEDUCATION: CPT

## 2022-05-18 PROCEDURE — 97016 VASOPNEUMATIC DEVICE THERAPY: CPT

## 2022-05-18 PROCEDURE — 97530 THERAPEUTIC ACTIVITIES: CPT

## 2022-05-18 PROCEDURE — 97110 THERAPEUTIC EXERCISES: CPT

## 2022-05-18 NOTE — FLOWSHEET NOTE
723 [facility] and 500 [address]  Phone: [phone]   Fax:     [phone]      Physical Therapy Treatment Note/ Progress Report:     Date:  2022    Patient Name:  Enio Marcelo    :  1955  MRN: [id number]  Restrictions/Precautions:    Medical/Treatment Diagnosis Information:  · Diagnosis: s/p L TKA D43.133 sx 3/28/22  · Treatment Diagnosis: Left knee pain M25. 445  Insurance/Certification information:  PT Insurance Information: Richa  Physician Information:  Referring Practitioner: Dr. Emily Johnson  Has the plan of care been signed (Y/N):        []  Yes  [x]  No     Date of Patient follow up with Physician: 22    Is this a Progress Report:     []  Yes  [x]  No      If Yes:  Date Range for reporting period:  Beginning: 3/30/22 ------------ Endin22    Progress report will be due (10 Rx or 30 days whichever is less): 3/34/19      Recertification will be due (POC Duration  / 90 days whichever is less): 22        Visit # Insurance Allowable Auth Required   In Person 16 30 (1 used) []  Yes     []  No    Tele Health 0  []  Yes     []  No    Total 16       Functional Scale: LEFS 21.5/80   Date assessed:  3/30/22     Latex Allergy:  [x]NO      []YES  Preferred Language for Healthcare:   [x]English       []other:    Pain level: 2/10     SUBJECTIVE:      OBJECTIVE:  0 (after manual therapy) - 128 ° of flexion sheet pulls   Observation:    Test measurements:            RESTRICTIONS/PRECAUTIONS:  s/p L TKA 3/28/22; hx R RUBY  Spoke with MA can change dressing at  appt    [9:28 AM] Sarah Joyner,    [9:29 AM] Sarah Valdez  Patient today of Dr. Emily Johnson that does not see the MD until . He has a pain pump.   Does the pain pump stay in until he follows up with MD?    [9:30 AM] Lien Montez, patient is to pull the pump once empty     [9:31 AM] Sarah clark thanks Exercises/Interventions:   Therapeutic Ex (33395) Sets/sec Reps Notes/CUES HEP   Bike ROM  Heel prop 8 min  5#   3 min ROM    Calf stretch belt 30s 5     HS stretch long 30s 5     Quad sets 10s 10     Heel slides   PT assist    Seated knee flex off EOB with use off opposite extremity for overpressure  Prone hang 10\"    5 min 10    5#     SLR  ABD 1# 2  1# 3 15  10     BS  Bridges  Calf stretch incline  Calf raises  Leg press  SL  IRIS- ABD            EXT  TKE  Step ups/Lat  Hamstring curls- single leg  Knee ext- single leg  SLS  Lateral band walk  Zig zag walk 10\"    30s  30sx3  150#  90#  65#  95#  110#  8 in  55#  55#  10s  Lime  Lime 10x  20x  3  3x10  3x10  3x10  3x10ea  3x10ea  30L  30x ea  3x10  3x10  10  5 plinths  5 plinths   3\" holds                      Foam, vc        4 week orthovitals due 5/23                  Pt education 10 min  Reviewed precautions, HEP with gradual progression, goals of PT, importance of early compliance with HEP for ROM, RICE principles- pt stated understanding    Manual Intervention (69495)       Sup/inf patella mobs, AP jt mobs 8 min                                         NMR re-education (37411)   CUES NEEDED    NMR with quad sets                                                               Therapeutic Activity (75594)                     VASO  10'                   [employer] access code:  [email or web address]           Therapeutic Exercise and NMR EXR  [x] (97426) Provided verbal/tactile cueing for activities related to strengthening, flexibility, endurance, ROM for improvements in LE, proximal hip, and core control with self care, mobility, lifting, ambulation. [x] (71712) Provided verbal/tactile cueing for activities related to improving balance, coordination, kinesthetic sense, posture, motor skill, proprioception to assist with LE, proximal hip, and core control in self-care, mobility, lifting, ambulation and eccentric single leg control.      NMR and Therapeutic Activities: [x] (37171 or 12590) Provided verbal/tactile cueing for activities related to improving balance, coordination, kinesthetic sense, posture, motor skill, proprioception and motor activation to allow for proper function of core, proximal hip and LE with self-care and ADLs and functional mobility.   [] (24504) Gait Re-education- Provided training and instruction to the patient for proper LE, core and proximal hip recruitment and positioning and eccentric body weight control with ambulation re-education including up and down stairs     Home Exercise Program:    [x] (97697) Reviewed/Progressed HEP activities related to strengthening, flexibility, endurance, ROM of core, proximal hip and LE for functional self-care, mobility, lifting and ambulation/stair navigation   [] (49209) Reviewed/Progressed HEP activities related to improving balance, coordination, kinesthetic sense, posture, motor skill, proprioception of core, proximal hip and LE for self-care, mobility, lifting, and ambulation/stair navigation      Manual Treatments:  PROM / STM / Oscillations-Mobs:  G-I, II, III, IV (PA's, Inf., Post.)  [x] (41847) Provided manual therapy to mobilize LE, proximal hip and/or LS spine soft tissue/joints for the purpose of modulating pain, promoting relaxation, increasing ROM, reducing/eliminating soft tissue swelling/inflammation/restriction, improving soft tissue extensibility and allowing for proper ROM for normal function with self-care, mobility, lifting and ambulation. Modalities:     [x] GAME READY (VASO)- for significant edema, swelling, pain control.      Charges:  Timed Code Treatment Minutes: 55   Total Treatment Minutes:  65   BWC:  TE TIME:  NMR TIME:  MANUAL TIME:  UNTIMED MINUTES:  Medicare Total:         [] EVAL (LOW) 00763 (typically 20 minutes face-to-face)  [] EVAL (MOD) 35731 (typically 30 minutes face-to-face)  [] EVAL (HIGH) 28823 (typically 45 minutes face-to-face)  [] RE-EVAL     [x] (35080) x   2  [] IONTO  [x] NMR (84431) x  1   [x] VASO  [] Manual (77127) x     [] Other:  [x] TA x    1   [] Mech Traction (63025)  [] ES(attended) (70747)      [] ES (un) (48687):    ASSESSMENT:  ROM, strength and function gradually increasing. No complaints at conclusion. Educated to continue focus on ROM especially on full extension. Gradually progressing machines without issues. Starting to add some strength to HEP with good understanding. Pt stated feels that he is progressing with improved function. Orthovitals NV. GOALS:    Patient stated goal: Walking without pain. Therapist goals for Patient:   Short Term Goals: To be achieved in: 2 weeks  1. Independent in HEP and progression per patient tolerance, in order to prevent re-injury. [x] Progressing: [] Met: [] Not Met: [] Adjusted     2. Patient will have a decrease in pain to facilitate improvement in movement, function, and ADLs as indicated by Functional Deficits. [x] Progressing: [] Met: [] Not Met: [] Adjusted     Long Term Goals: To be achieved in: 12 weeks  1. Disability index score of 50/80 or better for the LEFS to assist with reaching prior level of function. [x] Progressing: [] Met: [] Not Met: [] Adjusted     2. Patient will demonstrate increased AROM to 0-120 to allow for proper joint functioning as indicated by patients Functional Deficits. [x] Progressing: [] Met: [] Not Met: [] Adjusted     3. Patient will demonstrate an increase in Strength to good proximal hip strength and control, within 5lb HHD in LE to allow for proper functional mobility as indicated by patients Functional Deficits. [x] Progressing: [] Met: [] Not Met: [] Adjusted     4. Patient will return to functional activities including standing/walking 20-30 mins I no AD without increased symptoms or restriction. [x] Progressing: [] Met: [] Not Met: [] Adjusted     5.  Patient will ascend/descend 1 flight of steps I no AD (patient specific functional goal)    [x] Progressing: [] Met: [] Not Met: [] Adjusted         Overall Progression Towards Functional goals/ Treatment Progress Update:  [x] Patient is progressing as expected towards functional goals listed. [] Progression is slowed due to complexities/Impairments listed. [] Progression has been slowed due to co-morbidities. [] Plan just implemented, too soon to assess goals progression <30days   [] Goals require adjustment due to lack of progress  [] Patient is not progressing as expected and requires additional follow up with physician  [] Other    Prognosis for POC: [x] Good [] Fair  [] Poor      Patient requires continued skilled intervention: [x] Yes  [] No    Treatment/Activity Tolerance:  [x] Patient able to complete treatment  [] Patient limited by fatigue  [] Patient limited by pain    [] Patient limited by other medical complications  [] Other:     Return to Play: (if applicable)   []  Stage 1: Intro to Strength   []  Stage 2: Return to Run and Strength   []  Stage 3: Return to Jump and Strength   []  Stage 4: Dynamic Strength and Agility   []  Stage 5: Sport Specific Training     []  Ready to Return to Play, Meets All Above Stages   []  Not Ready for Return to Sports   Comments:                          PLAN:   [x] Continue per plan of care [] Alter current plan (see comments above)  [] Plan of care initiated [] Hold pending MD visit [] Discharge    Electronically signed by:  Adriana Mendosa, PT, DPT  Note: If patient does not return for scheduled/ recommended follow up visits, this note will serve as a discharge from care along with most recent update on progress.

## 2022-05-23 ENCOUNTER — HOSPITAL ENCOUNTER (OUTPATIENT)
Dept: PHYSICAL THERAPY | Age: 67
Setting detail: THERAPIES SERIES
Discharge: HOME | End: 2022-05-23
Payer: COMMERCIAL

## 2022-05-23 PROCEDURE — 97110 THERAPEUTIC EXERCISES: CPT

## 2022-05-23 PROCEDURE — 97112 NEUROMUSCULAR REEDUCATION: CPT

## 2022-05-23 PROCEDURE — 97530 THERAPEUTIC ACTIVITIES: CPT

## 2022-05-23 PROCEDURE — 97016 VASOPNEUMATIC DEVICE THERAPY: CPT

## 2022-05-23 NOTE — FLOWSHEET NOTE
723 [facility] and 500 [facility], [address] [place] [address], [address] [place] [address] [address]  Phone: [phone]   Fax:     [phone]      Physical Therapy Treatment Note/ Progress Report:     Date:  2022    Patient Name:  Vikram Atkins    :  1955  MRN: [id number]  Restrictions/Precautions:    Medical/Treatment Diagnosis Information:  · Diagnosis: s/p L TKA X40.508 sx 3/28/22  · Treatment Diagnosis: Left knee pain M25. 121  Insurance/Certification information:  PT Insurance Information: Richa  Physician Information:  Referring Practitioner: Dr. Romana Neptune  Has the plan of care been signed (Y/N):        []  Yes  [x]  No     Date of Patient follow up with Physician: 22    Is this a Progress Report:     []  Yes  [x]  No      If Yes:  Date Range for reporting period:  Beginning: 3/30/22 ------------ Endin22    Progress report will be due (10 Rx or 30 days whichever is less): 3/09/54      Recertification will be due (POC Duration  / 90 days whichever is less): 22        Visit # Insurance Allowable Auth Required   In Person 17 30 (1 used) []  Yes     []  No    Tele Health 0  []  Yes     []  No    Total 17       Functional Scale: LEFS 21.5/80   Date assessed:  3/30/22     Latex Allergy:  [x]NO      []YES  Preferred Language for Healthcare:   [x]English       []other:    Pain level: 2/10     SUBJECTIVE:   Pt stated knee feels great, was able to go to antique show and walk around quite a bit without issues, stated doesn't feel limited by the knee but did feel soreness in LB. OBJECTIVE:  0 (after manual therapy) - 128 ° of flexion sheet pulls   Observation:    Test measurements:                RESTRICTIONS/PRECAUTIONS:  s/p L TKA 3/28/22; hx R RUBY  Spoke with MA can change dressing at  appt    [9:28 AM] Belén Black,    [9:29 AM] Belén Andrews  Patient today of Dr. Romana Stephany that does not see the MD until . He has a pain pump.   Does the pain pump stay in until he follows up with MD?    [9:30 AM] Tio Montez Goodellkaren Mckeon, patient is to pull the pump once empty     [9:31 AM] Aston Qureshi  ok thanks           Exercises/Interventions:   Therapeutic Ex (61178) Sets/sec Reps Notes/CUES HEP   Bike ROM  Heel prop 8 min  5#   3 min ROM    Calf stretch belt 30s 5     HS stretch long 30s 5     Quad sets 10s 10     Heel slides   PT assist    Seated knee flex off EOB with use off opposite extremity for overpressure  Prone hang 10\"    5 min 10    5#     SLR  ABD 1# 2  1# 3 15  10     BS  Bridges  Calf stretch incline  Calf raises  Leg press  SL  IRIS- ABD            EXT  TKE  Step ups/Lat  Hamstring curls- single leg  Knee ext- single leg  SLS  Lateral band walk  Zig zag walk 10\"    30s  30sx3  180#  90#  65#  95#  110#  8 in  55#  55#  10s  Red  Red 10x  20x  3  3x10  3x10  3x10  3x10ea  3x10ea  30L  30x ea  3x10  3x10  10  5 plinths  5 plinths   3\" holds                      Foam, vc        8 week orthovitals due 5/23                  Pt education 10 min  Reviewed precautions, HEP with gradual progression, goals of PT, importance of early compliance with HEP for ROM, RICE principles- pt stated understanding    Manual Intervention (35015)       Sup/inf patella mobs, AP jt mobs                                          NMR re-education (57870)   CUES NEEDED    NMR with quad sets                                                               Therapeutic Activity (61713)                     VASO  10'                   [facility] access code:  [email or web address]           Therapeutic Exercise and NMR EXR  [x] (55041) Provided verbal/tactile cueing for activities related to strengthening, flexibility, endurance, ROM for improvements in LE, proximal hip, and core control with self care, mobility, lifting, ambulation.   [x] (92121) Provided verbal/tactile cueing for activities related to improving balance, coordination, kinesthetic sense, posture, motor skill, proprioception to assist with LE, proximal hip, and core control in self-care, mobility, lifting, ambulation and eccentric single leg control. NMR and Therapeutic Activities:    [x] (54139 or 90965) Provided verbal/tactile cueing for activities related to improving balance, coordination, kinesthetic sense, posture, motor skill, proprioception and motor activation to allow for proper function of core, proximal hip and LE with self-care and ADLs and functional mobility.   [] (50833) Gait Re-education- Provided training and instruction to the patient for proper LE, core and proximal hip recruitment and positioning and eccentric body weight control with ambulation re-education including up and down stairs     Home Exercise Program:    [x] (93037) Reviewed/Progressed HEP activities related to strengthening, flexibility, endurance, ROM of core, proximal hip and LE for functional self-care, mobility, lifting and ambulation/stair navigation   [] (13643) Reviewed/Progressed HEP activities related to improving balance, coordination, kinesthetic sense, posture, motor skill, proprioception of core, proximal hip and LE for self-care, mobility, lifting, and ambulation/stair navigation      Manual Treatments:  PROM / STM / Oscillations-Mobs:  G-I, II, III, IV (PA's, Inf., Post.)  [x] (84600) Provided manual therapy to mobilize LE, proximal hip and/or LS spine soft tissue/joints for the purpose of modulating pain, promoting relaxation, increasing ROM, reducing/eliminating soft tissue swelling/inflammation/restriction, improving soft tissue extensibility and allowing for proper ROM for normal function with self-care, mobility, lifting and ambulation. Modalities:     [x] GAME READY (VASO)- for significant edema, swelling, pain control.      Charges:  Timed Code Treatment Minutes: 60   Total Treatment Minutes:  70   BWC:  TE TIME:  NMR TIME:  MANUAL TIME:  UNTIMED MINUTES:  Medicare Total:         [] EVAL (LOW) 41875 (typically 20 minutes face-to-face)  [] EVAL (MOD) 38178 (typically 30 minutes face-to-face)  [] EVAL (HIGH) 81028 (typically 45 minutes face-to-face)  [] RE-EVAL     [x] ZC(34530) x   2  [] IONTO  [x] NMR (43219) x  1   [x] VASO  [] Manual (47826) x     [] Other:  [x] TA x    1   [] Mech Traction (22989)  [] ES(attended) (69504)      [] ES (un) (27648):    ASSESSMENT:  ROM, strength and function gradually increasing. No complaints at conclusion. Educated to continue focus on ROM especially on full extension. Gradually progressing machines without issues. Starting to add some strength to HEP with good understanding. Pt stated feels that he is progressing with improved function. Educated on upright posture when ambulating to decrease stress on LB. GOALS:    Patient stated goal: Walking without pain. Therapist goals for Patient:   Short Term Goals: To be achieved in: 2 weeks  1. Independent in HEP and progression per patient tolerance, in order to prevent re-injury. [x] Progressing: [] Met: [] Not Met: [] Adjusted     2. Patient will have a decrease in pain to facilitate improvement in movement, function, and ADLs as indicated by Functional Deficits. [x] Progressing: [] Met: [] Not Met: [] Adjusted     Long Term Goals: To be achieved in: 12 weeks  1. Disability index score of 50/80 or better for the LEFS to assist with reaching prior level of function. [x] Progressing: [] Met: [] Not Met: [] Adjusted     2. Patient will demonstrate increased AROM to 0-120 to allow for proper joint functioning as indicated by patients Functional Deficits. [x] Progressing: [] Met: [] Not Met: [] Adjusted     3. Patient will demonstrate an increase in Strength to good proximal hip strength and control, within 5lb HHD in LE to allow for proper functional mobility as indicated by patients Functional Deficits. [x] Progressing: [] Met: [] Not Met: [] Adjusted     4.  Patient will return to functional activities including standing/walking 20-30 mins I no AD without increased symptoms or restriction. [x] Progressing: [] Met: [] Not Met: [] Adjusted     5. Patient will ascend/descend 1 flight of steps I no AD (patient specific functional goal)    [x] Progressing: [] Met: [] Not Met: [] Adjusted         Overall Progression Towards Functional goals/ Treatment Progress Update:  [x] Patient is progressing as expected towards functional goals listed. [] Progression is slowed due to complexities/Impairments listed. [] Progression has been slowed due to co-morbidities. [] Plan just implemented, too soon to assess goals progression <30days   [] Goals require adjustment due to lack of progress  [] Patient is not progressing as expected and requires additional follow up with physician  [] Other    Prognosis for POC: [x] Good [] Fair  [] Poor      Patient requires continued skilled intervention: [x] Yes  [] No    Treatment/Activity Tolerance:  [x] Patient able to complete treatment  [] Patient limited by fatigue  [] Patient limited by pain    [] Patient limited by other medical complications  [] Other:     Return to Play: (if applicable)   []  Stage 1: Intro to Strength   []  Stage 2: Return to Run and Strength   []  Stage 3: Return to Jump and Strength   []  Stage 4: Dynamic Strength and Agility   []  Stage 5: Sport Specific Training     []  Ready to Return to Play, Meets All Above Stages   []  Not Ready for Return to Sports   Comments:                          PLAN:   [x] Continue per plan of care [] Alter current plan (see comments above)  [] Plan of care initiated [] Hold pending MD visit [] Discharge    Electronically signed by:  Gene Shine PT, DPT  Note: If patient does not return for scheduled/ recommended follow up visits, this note will serve as a discharge from care along with most recent update on progress.

## 2022-05-25 ENCOUNTER — HOSPITAL ENCOUNTER (OUTPATIENT)
Dept: PHYSICAL THERAPY | Age: 67
Setting detail: THERAPIES SERIES
Discharge: HOME | End: 2022-05-25
Payer: COMMERCIAL

## 2022-05-25 PROCEDURE — 97110 THERAPEUTIC EXERCISES: CPT

## 2022-05-25 PROCEDURE — 97112 NEUROMUSCULAR REEDUCATION: CPT

## 2022-05-25 PROCEDURE — 97016 VASOPNEUMATIC DEVICE THERAPY: CPT

## 2022-05-25 PROCEDURE — 97530 THERAPEUTIC ACTIVITIES: CPT

## 2022-05-25 NOTE — FLOWSHEET NOTE
723 [facility] and 500 [address], [address] [place] [address] [address]  Phone: [phone]   Fax:     [phone]      Physical Therapy Treatment Note/ Progress Report:     Date:  2022    Patient Name:  Agustin Melvin    :  1955  MRN: [id number]  Restrictions/Precautions:    Medical/Treatment Diagnosis Information:  · Diagnosis: s/p L TKA U58.423 sx 3/28/22  · Treatment Diagnosis: Left knee pain M25. 991  Insurance/Certification information:  PT Insurance Information: Richa  Physician Information:  Referring Practitioner: Dr. Belle Manriquez  Has the plan of care been signed (Y/N):        []  Yes  [x]  No     Date of Patient follow up with Physician: 22    Is this a Progress Report:     []  Yes  [x]  No      If Yes:  Date Range for reporting period:  Beginning: 3/30/22 ------------ Endin22    Progress report will be due (10 Rx or 30 days whichever is less):       Recertification will be due (POC Duration  / 90 days whichever is less): 22        Visit # Insurance Allowable Auth Required   In Person 18 30 (1 used) []  Yes     []  No    Tele Health 0  []  Yes     []  No    Total 18       Functional Scale: LEFS 21.5/80   Date assessed:  3/30/22     Latex Allergy:  [x]NO      []YES  Preferred Language for Healthcare:   [x]English       []other:    Pain level: 2/10     SUBJECTIVE:   Pt stated continues to feel gradual progress. OBJECTIVE:  0 (after manual therapy) - 128 ° of flexion sheet pulls   Observation:    Test measurements:            RESTRICTIONS/PRECAUTIONS:  s/p L TKA 3/28/22; hx R RUBY  Spoke with MA can change dressing at  appt    [9:28 AM] Charly Mckeon,    [9:29 AM] Charly Noble  Patient today of Dr. Belle Manriquez that does not see the MD until . He has a pain pump.   Does the pain pump stay in until he follows up with MD?    [9:30 AM] [place], Ed Pleasant  Nope, patient is to pull the pump once empty [9:31 AM] Jameson Helenapaigearmani  ok thanks           Exercises/Interventions:   Therapeutic Ex (94544) Sets/sec Reps Notes/CUES HEP   Bike ROM  Heel prop 8 min  5#   3 min ROM    Calf stretch belt 30s 5     HS stretch long 30s 5     Quad sets 10s 10     Heel slides   PT assist    Seated knee flex off EOB with use off opposite extremity for overpressure  Prone hang 10\"    5 min 10    5#     SLR  ABD 1# 2  1# 3 15  10     BS  Bridges  Calf stretch incline  Calf raises  Leg press  SL  IRIS- ABD            EXT  TKE  Step ups/Lat  Hamstring curls- single leg  Knee ext- single leg  SLS  Lateral band walk  Zig zag walk 10\"    30s  30sx3  190#  100#  65#  95#  110#  8 in  60#  60#  10s  Red  Red 10x  20x  3  3x10  3x10  3x10  3x10ea  3x10ea  30L  30x ea  3x10  3x10  10  5 plinths  5 plinths   3\" holds                      Foam, vc        8 week orthovitals due 5/23                  Pt education 10 min  Reviewed precautions, HEP with gradual progression, goals of PT, importance of early compliance with HEP for ROM, RICE principles- pt stated understanding    Manual Intervention (75063)       Sup/inf patella mobs, AP jt mobs                                          NMR re-education (81257)   CUES NEEDED    NMR with quad sets                                                               Therapeutic Activity (94094)                     VASO  10'                   [facility] access code:  [email or web address]           Therapeutic Exercise and NMR EXR  [x] (44652) Provided verbal/tactile cueing for activities related to strengthening, flexibility, endurance, ROM for improvements in LE, proximal hip, and core control with self care, mobility, lifting, ambulation. [x] (03501) Provided verbal/tactile cueing for activities related to improving balance, coordination, kinesthetic sense, posture, motor skill, proprioception to assist with LE, proximal hip, and core control in self-care, mobility, lifting, ambulation and eccentric single leg control. NMR and Therapeutic Activities:    [x] (36725 or 91256) Provided verbal/tactile cueing for activities related to improving balance, coordination, kinesthetic sense, posture, motor skill, proprioception and motor activation to allow for proper function of core, proximal hip and LE with self-care and ADLs and functional mobility.   [] (28752) Gait Re-education- Provided training and instruction to the patient for proper LE, core and proximal hip recruitment and positioning and eccentric body weight control with ambulation re-education including up and down stairs     Home Exercise Program:    [x] (91321) Reviewed/Progressed HEP activities related to strengthening, flexibility, endurance, ROM of core, proximal hip and LE for functional self-care, mobility, lifting and ambulation/stair navigation   [] (69867) Reviewed/Progressed HEP activities related to improving balance, coordination, kinesthetic sense, posture, motor skill, proprioception of core, proximal hip and LE for self-care, mobility, lifting, and ambulation/stair navigation      Manual Treatments:  PROM / STM / Oscillations-Mobs:  G-I, II, III, IV (PA's, Inf., Post.)  [x] (08345) Provided manual therapy to mobilize LE, proximal hip and/or LS spine soft tissue/joints for the purpose of modulating pain, promoting relaxation, increasing ROM, reducing/eliminating soft tissue swelling/inflammation/restriction, improving soft tissue extensibility and allowing for proper ROM for normal function with self-care, mobility, lifting and ambulation. Modalities:     [x] GAME READY (VASO)- for significant edema, swelling, pain control.      Charges:  Timed Code Treatment Minutes: 55   Total Treatment Minutes:  65   BWC:  TE TIME:  NMR TIME:  MANUAL TIME:  UNTIMED MINUTES:  Medicare Total:         [] EVAL (LOW) 11311 (typically 20 minutes face-to-face)  [] EVAL (MOD) 53532 (typically 30 minutes face-to-face)  [] EVAL (HIGH) 22640 (typically 45 minutes face-to-face)  [] RE-EVAL     [x] ZB(21927) x   2  [] IONTO  [x] NMR (96826) x  1   [x] VASO  [] Manual (52638) x     [] Other:  [x] TA x    1   [] Mech Traction (50894)  [] ES(attended) (29265)      [] ES (un) (09555):    ASSESSMENT:  ROM, strength and function gradually increasing. No complaints at conclusion. Educated to continue focus on ROM especially on full extension. Gradually progressing machines without issues. Starting to add some strength to HEP with good understanding. Pt stated feels that he is progressing with improved function. Educated on upright posture when ambulating to decrease stress on LB. Pt agrees to finish 2 more PT visits next week then returns to work so will transition to I with HEP at that time. GOALS:    Patient stated goal: Walking without pain. Therapist goals for Patient:   Short Term Goals: To be achieved in: 2 weeks  1. Independent in HEP and progression per patient tolerance, in order to prevent re-injury. [] Progressing: [x] Met: [] Not Met: [] Adjusted     2. Patient will have a decrease in pain to facilitate improvement in movement, function, and ADLs as indicated by Functional Deficits. [] Progressing: [x] Met: [] Not Met: [] Adjusted     Long Term Goals: To be achieved in: 12 weeks  1. Disability index score of 50/80 or better for the LEFS to assist with reaching prior level of function. [x] Progressing: [] Met: [] Not Met: [] Adjusted     2. Patient will demonstrate increased AROM to 0-120 to allow for proper joint functioning as indicated by patients Functional Deficits. [x] Progressing: [x] Met: [] Not Met: [] Adjusted     3. Patient will demonstrate an increase in Strength to good proximal hip strength and control, within 5lb HHD in LE to allow for proper functional mobility as indicated by patients Functional Deficits. [x] Progressing: [] Met: [] Not Met: [] Adjusted     4.  Patient will return to functional activities including standing/walking 20-30 mins I no AD without increased symptoms or restriction. [] Progressing: [x] Met: [] Not Met: [] Adjusted     5. Patient will ascend/descend 1 flight of steps I no AD (patient specific functional goal)    [] Progressing: [x] Met: [] Not Met: [] Adjusted         Overall Progression Towards Functional goals/ Treatment Progress Update:  [x] Patient is progressing as expected towards functional goals listed. [] Progression is slowed due to complexities/Impairments listed. [] Progression has been slowed due to co-morbidities. [] Plan just implemented, too soon to assess goals progression <30days   [] Goals require adjustment due to lack of progress  [] Patient is not progressing as expected and requires additional follow up with physician  [] Other    Prognosis for POC: [x] Good [] Fair  [] Poor      Patient requires continued skilled intervention: [x] Yes  [] No    Treatment/Activity Tolerance:  [x] Patient able to complete treatment  [] Patient limited by fatigue  [] Patient limited by pain    [] Patient limited by other medical complications  [] Other:     Return to Play: (if applicable)   []  Stage 1: Intro to Strength   []  Stage 2: Return to Run and Strength   []  Stage 3: Return to Jump and Strength   []  Stage 4: Dynamic Strength and Agility   []  Stage 5: Sport Specific Training     []  Ready to Return to Play, Meets All Above Stages   []  Not Ready for Return to Sports   Comments:                          PLAN:   [x] Continue per plan of care [] Alter current plan (see comments above)  [] Plan of care initiated [] Hold pending MD visit [] Discharge    Electronically signed by:  Nano Finn, PT, DPT  Note: If patient does not return for scheduled/ recommended follow up visits, this note will serve as a discharge from care along with most recent update on progress.

## 2022-05-31 ENCOUNTER — HOSPITAL ENCOUNTER (OUTPATIENT)
Dept: PHYSICAL THERAPY | Age: 67
Setting detail: THERAPIES SERIES
Discharge: HOME | End: 2022-05-31
Payer: COMMERCIAL

## 2022-05-31 PROCEDURE — 97112 NEUROMUSCULAR REEDUCATION: CPT | Performed by: SPECIALIST/TECHNOLOGIST

## 2022-05-31 PROCEDURE — 97016 VASOPNEUMATIC DEVICE THERAPY: CPT | Performed by: SPECIALIST/TECHNOLOGIST

## 2022-05-31 PROCEDURE — 97530 THERAPEUTIC ACTIVITIES: CPT | Performed by: SPECIALIST/TECHNOLOGIST

## 2022-05-31 PROCEDURE — 97110 THERAPEUTIC EXERCISES: CPT | Performed by: SPECIALIST/TECHNOLOGIST

## 2022-05-31 RX ORDER — AMOXICILLIN 500 MG/1
CAPSULE ORAL
Qty: 8 CAPSULE | Refills: 1 | Status: SHIPPED | OUTPATIENT
Start: 2022-05-31 | End: 2022-10-07

## 2022-05-31 NOTE — PROGRESS NOTES
[address]23 [facility] [place] [address], [address]  Phone: [phone]   Fax:     [phone]      723 [facility] and [address] [place] [address], [address] [place] [address], [address] [facility] [address]  Phone: [phone]   Fax: [phone]    Date: 2022          Patient Name; :  Gil Randhawa; 1955   · Dx:   Diagnosis: s/p L TKA X60.252 sx 3/28/22  · Treatment Diagnosis: Left knee pain M25. 562       Physician:   Dr. Ashwin Boyer      Total PT Visits: 19     Measures Previous Current   Pain (0-10) 2 0   Disability % 59 24   ROM -5-127 -2-127     Specific Functional Improvements & Impressions: Pt demonstrated good improvements in function and ROM. Plan & Recommendations:  [] Continue rehabilitation due to objective improvement and continued functional deficits with frequency and duration:  [] Progress toward  []GAP, []Work Conditioning, []Independent HEP   [] Discharge due to   [] All goals achieved, [] Maximized \"medical necessity\" [] No subjective or objective improvements      Electronically signed by:  Brooke Coronado PTA, MHI, ATC  Therapy Plan of Care Re-Certification  This patient has been re-evaluated for physical therapy services and for therapy to continue, Medicare, Medicaid and other insurances require periodic physician review of the treatment plan.  Please review the above re-evaluation and verify that you agree with plan of care as established above by signing the attached document and return it to our office or note changes to established plan below  [] Follow treatment plan as above [] Discontinue physical therapy  [] Change plan to:                                 __________________________________________________    Physician Signature:____________________________________ Date:____________  By signing above, therapists plan is approved by physician    If you have any questions or concerns, please don't hesitate to call. Thank you for your referral.  Physical Therapy Treatment Note/ Progress Report:     Date:  2022    Patient Name:  Herber Meza    :  1955  MRN: [id number]  Restrictions/Precautions:    Medical/Treatment Diagnosis Information:  · Diagnosis: s/p L TKA G48.420 sx 3/28/22  · Treatment Diagnosis: Left knee pain M25. 297  Insurance/Certification information:  PT Insurance Information: [facility]  Physician Information:  Referring Practitioner: Dr. Fernanda Weller  Has the plan of care been signed (Y/N):        []  Yes  [x]  No     Date of Patient follow up with Physician: 22    Is this a Progress Report:     [x]  Yes  [x]  No      If Yes:  Date Range for reporting period:  Beginnin22 ------------ Endin22    Progress report will be due (10 Rx or 30 days whichever is less): 24      Recertification will be due (POC Duration  / 90 days whichever is less): 22        Visit # Insurance Allowable Auth Required   In Person  (1 used) []  Yes     []  No    [employer] 0  []  Yes     []  No    Total 19       Functional Scale:  24%  Date assessed:  22    Latex Allergy:  [x]NO      []YES  Preferred Language for Healthcare:   [x]English       []other:    Pain level: 10      SUBJECTIVE:   Pt stated continues to feel gradual progress. OBJECTIVE:  -2 (after manual therapy) - 127 ° of flexion sheet pulls   Observation:    Test measurements:            RESTRICTIONS/PRECAUTIONS:  s/p L TKA 3/28/22; hx R RUBY  Spoke with MA can change dressing at  appt    [9:28 AM] Amelia Rockwell,    [9:29 AM] Amelia Rob  Patient today of Dr. Fernanda Weller that does not see the MD until . He has a pain pump.   Does the pain pump stay in until he follows up with MD?    [9:30 AM] Kindra Montez, patient is to pull the pump once empty     [9:31 AM] Amelia Rob  ok thanks           Exercises/Interventions:   Therapeutic Ex (69587) Sets/sec Reps Notes/CUES HEP   Bike ROM  Heel prop 8 min  5#   3 min ROM    Calf stretch belt 30s 5     HS stretch long 30s 5     Quad sets 10s 10     Heel slides   PT assist    Seated knee flex off EOB with use off opposite extremity for overpressure  Prone hang 10\"    5 min 10    5#     SLR  ABD 1# 2  1# 3 15  10     BS  Bridges  Calf stretch incline  Calf raises  Leg press  SL  IRIS- ABD            EXT  TKE  Step ups/Lat  Hamstring curls- single leg  Knee ext- single leg  SLS  Lateral band walk  Zig zag walk 10\"    30s  30sx3  190#  100#  65#  95#  110#  8 in  60#  60#  10s  Red  Red 10x  20x  3  3x10  3x10  3x10  3x10ea  3x10ea  30L  30x ea  3x10  3x10  10  5 plinths  5 plinths   3\" holds                      Foam, vc        8 week orthovitals due 5/23                  Pt education 10 min  Reviewed precautions, HEP with gradual progression, goals of PT, importance of early compliance with HEP for ROM, RICE principles- pt stated understanding    Manual Intervention (89643)       Sup/inf patella mobs, AP jt mobs                                          NMR re-education (74044)   CUES NEEDED    NMR with quad sets                                                               Therapeutic Activity (31325)                     VASO  10'                   [facility] access code:  [email or web address]           Therapeutic Exercise and NMR EXR  [x] (52062) Provided verbal/tactile cueing for activities related to strengthening, flexibility, endurance, ROM for improvements in LE, proximal hip, and core control with self care, mobility, lifting, ambulation. [x] (83712) Provided verbal/tactile cueing for activities related to improving balance, coordination, kinesthetic sense, posture, motor skill, proprioception to assist with LE, proximal hip, and core control in self-care, mobility, lifting, ambulation and eccentric single leg control.      NMR and Therapeutic Activities:    [x] (05176 or 90421) Provided verbal/tactile cueing for activities related to improving balance, coordination, kinesthetic sense, posture, motor skill, proprioception and motor activation to allow for proper function of core, proximal hip and LE with self-care and ADLs and functional mobility.   [] (57879) Gait Re-education- Provided training and instruction to the patient for proper LE, core and proximal hip recruitment and positioning and eccentric body weight control with ambulation re-education including up and down stairs     Home Exercise Program:    [x] (81321) Reviewed/Progressed HEP activities related to strengthening, flexibility, endurance, ROM of core, proximal hip and LE for functional self-care, mobility, lifting and ambulation/stair navigation   [] (46816) Reviewed/Progressed HEP activities related to improving balance, coordination, kinesthetic sense, posture, motor skill, proprioception of core, proximal hip and LE for self-care, mobility, lifting, and ambulation/stair navigation      Manual Treatments:  PROM / STM / Oscillations-Mobs:  G-I, II, III, IV (PA's, Inf., Post.)  [x] (69372) Provided manual therapy to mobilize LE, proximal hip and/or LS spine soft tissue/joints for the purpose of modulating pain, promoting relaxation, increasing ROM, reducing/eliminating soft tissue swelling/inflammation/restriction, improving soft tissue extensibility and allowing for proper ROM for normal function with self-care, mobility, lifting and ambulation. Modalities:     [x] GAME READY (VASO)- for significant edema, swelling, pain control.      Charges:  Timed Code Treatment Minutes: 55   Total Treatment Minutes:  65   BWC:  TE TIME:  NMR TIME:  MANUAL TIME:  UNTIMED MINUTES:  Medicare Total:         [] EVAL (LOW) 00663 (typically 20 minutes face-to-face)  [] EVAL (MOD) 17336 (typically 30 minutes face-to-face)  [] EVAL (HIGH) 32463 (typically 45 minutes face-to-face)  [] RE-EVAL     [x] IN(36558) x   2  [] IONTO  [x] NMR (05634) x  1   [x] VASO  [] Manual (35202) x     [] will ascend/descend 1 flight of steps I no AD (patient specific functional goal)    [] Progressing: [x] Met: [] Not Met: [] Adjusted         Overall Progression Towards Functional goals/ Treatment Progress Update:  [x] Patient is progressing as expected towards functional goals listed. [] Progression is slowed due to complexities/Impairments listed. [] Progression has been slowed due to co-morbidities. [] Plan just implemented, too soon to assess goals progression <30days   [] Goals require adjustment due to lack of progress  [] Patient is not progressing as expected and requires additional follow up with physician  [] Other    Prognosis for POC: [x] Good [] Fair  [] Poor      Patient requires continued skilled intervention: [x] Yes  [] No    Treatment/Activity Tolerance:  [x] Patient able to complete treatment  [] Patient limited by fatigue  [] Patient limited by pain    [] Patient limited by other medical complications  [] Other:     Return to Play: (if applicable)   []  Stage 1: Intro to Strength   []  Stage 2: Return to Run and Strength   []  Stage 3: Return to Jump and Strength   []  Stage 4: Dynamic Strength and Agility   []  Stage 5: Sport Specific Training     []  Ready to Return to Play, Meets All Above Stages   []  Not Ready for Return to Sports   Comments:                          PLAN:   [x] Continue per plan of care [] Alter current plan (see comments above)  [] Plan of care initiated [] Hold pending MD visit [] Discharge    Electronically signed by:  Mc Culp PTA, MHI, ATC  Note: If patient does not return for scheduled/ recommended follow up visits, this note will serve as a discharge from care along with most recent update on progress.

## 2022-06-03 ENCOUNTER — HOSPITAL ENCOUNTER (OUTPATIENT)
Dept: PHYSICAL THERAPY | Age: 67
Setting detail: THERAPIES SERIES
Discharge: HOME | End: 2022-06-03
Payer: COMMERCIAL

## 2022-06-03 PROCEDURE — 97016 VASOPNEUMATIC DEVICE THERAPY: CPT | Performed by: SPECIALIST/TECHNOLOGIST

## 2022-06-03 PROCEDURE — 97112 NEUROMUSCULAR REEDUCATION: CPT | Performed by: SPECIALIST/TECHNOLOGIST

## 2022-06-03 PROCEDURE — 97530 THERAPEUTIC ACTIVITIES: CPT | Performed by: SPECIALIST/TECHNOLOGIST

## 2022-06-03 PROCEDURE — 97110 THERAPEUTIC EXERCISES: CPT | Performed by: SPECIALIST/TECHNOLOGIST

## 2022-06-03 NOTE — PROGRESS NOTES
723 [facility] and [address], [address] [place] [address] [address]  Phone: [phone]   Fax:     [phone]    Physical Therapy Treatment Note/ Progress Report:     Date:  6/3/2022    Patient Name:  Jeanie Robert    :  1955  MRN: [id number]  Restrictions/Precautions:    Medical/Treatment Diagnosis Information:  · Diagnosis: s/p L TKA T27.245 sx 3/28/22  · Treatment Diagnosis: Left knee pain M25. 119  Insurance/Certification information:  PT Insurance Information: [employer]  Physician Information:  Referring Practitioner: Dr. Michael Rojas  Has the plan of care been signed (Y/N):        []  Yes  [x]  No     Date of Patient follow up with Physician: 22    Is this a Progress Report:     [x]  Yes  [x]  No      If Yes:  Date Range for reporting period:  Beginnin22 ------------ Endin22    Progress report will be due (10 Rx or 30 days whichever is less):       Recertification will be due (POC Duration  / 90 days whichever is less): 22        Visit # Insurance Allowable Auth Required   In Person 20 30 (1 used) []  Yes     []  No    Tele Health 0  []  Yes     []  No    Total 20       Functional Scale:  24%  Date assessed:  22    Latex Allergy:  [x]NO      []YES  Preferred Language for Healthcare:   [x]English       []other:    Pain level: 210      SUBJECTIVE:   Pt notes he feels really good and is ready to transition to his HEP. OBJECTIVE:  0 (after manual therapy) - 128 ° of flexion sheet pulls   Observation:    Test measurements:            RESTRICTIONS/PRECAUTIONS:  s/p L TKA 3/28/22; hx R RUBY  Spoke with MA can change dressing at  appt    [9:28 AM] Evelyn Bras  Kimberlyn Calender,    [9:29 AM] Evelyn Alberta  Patient today of Dr. Michael Rojas that does not see the MD until . He has a pain pump.   Does the pain pump stay in until he follows up with MD?    [9:30 AM] Piper Montez, patient is to pull the pump once empty     [9:31 AM] Sobeida clark thanks           Exercises/Interventions:   Therapeutic Ex (40864) Sets/sec Reps Notes/CUES HEP   Bike ROM  Heel prop 8 min  5#   3 min ROM    Calf stretch belt 30s 5     HS stretch long 30s 5     Quad sets 10s 10     Heel slides   PT assist    Seated knee flex off EOB with use off opposite extremity for overpressure  Prone hang 10\"    5 min 10    5#     SLR  ABD 1# 2  1# 3 15  10     BS  Bridges  Calf stretch incline  Calf raises  Leg press  SL  IRIS- ABD            EXT  TKE  Step ups/Lat  Hamstring curls- single leg  Knee ext- single leg  SLS  Lateral band walk  Zig zag walk 10\"    30s  30sx3  190#  100#  65#  95#  110#  8 in  60#  60#  10s  Red  Red 10x  20x  3  3x10  3x10  3x10  3x10ea  3x10ea  30L  30x ea  3x10  3x10  10  5 plinths  5 plinths   3\" holds                      Foam, vc        8 week orthovitals due 5/23                  Pt education 10 min  Reviewed precautions, HEP with gradual progression, goals of PT, importance of early compliance with HEP for ROM, RICE principles- pt stated understanding    Manual Intervention (07913)       Sup/inf patella mobs, AP jt mobs                                          NMR re-education (57091)   CUES NEEDED    NMR with quad sets                                                               Therapeutic Activity (01468)                     VASO  10'                   [facility] access code:  [email or web address]           Therapeutic Exercise and NMR EXR  [x] (20290) Provided verbal/tactile cueing for activities related to strengthening, flexibility, endurance, ROM for improvements in LE, proximal hip, and core control with self care, mobility, lifting, ambulation.   [x] (00232) Provided verbal/tactile cueing for activities related to improving balance, coordination, kinesthetic sense, posture, motor skill, proprioception to assist with LE, proximal hip, and core control in self-care, mobility, lifting, ambulation and eccentric single leg control. NMR and Therapeutic Activities:    [x] (25345 or 19043) Provided verbal/tactile cueing for activities related to improving balance, coordination, kinesthetic sense, posture, motor skill, proprioception and motor activation to allow for proper function of core, proximal hip and LE with self-care and ADLs and functional mobility.   [] (57075) Gait Re-education- Provided training and instruction to the patient for proper LE, core and proximal hip recruitment and positioning and eccentric body weight control with ambulation re-education including up and down stairs     Home Exercise Program:    [x] (45764) Reviewed/Progressed HEP activities related to strengthening, flexibility, endurance, ROM of core, proximal hip and LE for functional self-care, mobility, lifting and ambulation/stair navigation   [] (91564) Reviewed/Progressed HEP activities related to improving balance, coordination, kinesthetic sense, posture, motor skill, proprioception of core, proximal hip and LE for self-care, mobility, lifting, and ambulation/stair navigation      Manual Treatments:  PROM / STM / Oscillations-Mobs:  G-I, II, III, IV (PA's, Inf., Post.)  [x] (56904) Provided manual therapy to mobilize LE, proximal hip and/or LS spine soft tissue/joints for the purpose of modulating pain, promoting relaxation, increasing ROM, reducing/eliminating soft tissue swelling/inflammation/restriction, improving soft tissue extensibility and allowing for proper ROM for normal function with self-care, mobility, lifting and ambulation. Modalities:     [x] GAME READY (VASO)- for significant edema, swelling, pain control.      Charges:  Timed Code Treatment Minutes: 55   Total Treatment Minutes:  65   BWC:  TE TIME:  NMR TIME:  MANUAL TIME:  UNTIMED MINUTES:  Medicare Total:         [] EVAL (LOW) 77179 (typically 20 minutes face-to-face)  [] EVAL (MOD) 88837 (typically 30 minutes face-to-face)  [] EVAL (HIGH) 38112 (typically 45 minutes face-to-face)  [] RE-EVAL     [x] SATHYA(46371) x   2  [] IONTO  [x] NMR (55759) x  1   [x] VASO  [] Manual (72889) x     [] Other:  [x] TA x    1   [] Mech Traction (00018)  [] ES(attended) (80213)      [] ES (un) (19657):    ASSESSMENT:  ROM, strength and function gradually increasing. No complaints at conclusion. Educated to continue focus on ROM especially on full extension. Gradually progressing machines without issues. Starting to add some strength to HEP with good understanding. Pt stated feels that he is progressing with improved function. Educated on upright posture when ambulating to decrease stress on LB. Pt agrees to finish 2 more PT visits next week then returns to work so will transition to I with HEP at that time. GOALS:    Patient stated goal: Walking without pain. Therapist goals for Patient:   Short Term Goals: To be achieved in: 2 weeks  1. Independent in HEP and progression per patient tolerance, in order to prevent re-injury. [] Progressing: [x] Met: [] Not Met: [] Adjusted     2. Patient will have a decrease in pain to facilitate improvement in movement, function, and ADLs as indicated by Functional Deficits. [] Progressing: [x] Met: [] Not Met: [] Adjusted     Long Term Goals: To be achieved in: 12 weeks  1. Disability index score of 50/80 or better for the LEFS to assist with reaching prior level of function. [x] Progressing: [] Met: [] Not Met: [] Adjusted     2. Patient will demonstrate increased AROM to 0-120 to allow for proper joint functioning as indicated by patients Functional Deficits. [x] Progressing: [x] Met: [] Not Met: [] Adjusted     3. Patient will demonstrate an increase in Strength to good proximal hip strength and control, within 5lb HHD in LE to allow for proper functional mobility as indicated by patients Functional Deficits. [x] Progressing: [] Met: [] Not Met: [] Adjusted     4.  Patient will return to functional activities including standing/walking 20-30 mins I no AD without increased symptoms or restriction. [] Progressing: [x] Met: [] Not Met: [] Adjusted     5. Patient will ascend/descend 1 flight of steps I no AD (patient specific functional goal)    [] Progressing: [x] Met: [] Not Met: [] Adjusted         Overall Progression Towards Functional goals/ Treatment Progress Update:  [x] Patient is progressing as expected towards functional goals listed. [] Progression is slowed due to complexities/Impairments listed. [] Progression has been slowed due to co-morbidities. [] Plan just implemented, too soon to assess goals progression <30days   [] Goals require adjustment due to lack of progress  [] Patient is not progressing as expected and requires additional follow up with physician  [] Other    Prognosis for POC: [x] Good [] Fair  [] Poor      Patient requires continued skilled intervention: [x] Yes  [] No    Treatment/Activity Tolerance:  [x] Patient able to complete treatment  [] Patient limited by fatigue  [] Patient limited by pain    [] Patient limited by other medical complications  [] Other:     Return to Play: (if applicable)   []  Stage 1: Intro to Strength   []  Stage 2: Return to Run and Strength   []  Stage 3: Return to Jump and Strength   []  Stage 4: Dynamic Strength and Agility   []  Stage 5: Sport Specific Training     []  Ready to Return to Play, Meets All Above Stages   []  Not Ready for Return to Sports   Comments:                          PLAN:   [x] Continue per plan of care [] Alter current plan (see comments above)  [] Plan of care initiated [] Hold pending MD visit [] Discharge    Electronically signed by:  Rahel Hannon, RAHUL, MHI, ATC  Note: If patient does not return for scheduled/ recommended follow up visits, this note will serve as a discharge from care along with most recent update on progress.

## 2022-08-18 ENCOUNTER — OFFICE VISIT (OUTPATIENT)
Dept: ORTHOPEDIC SURGERY | Age: 67
End: 2022-08-18
Payer: COMMERCIAL

## 2022-08-18 VITALS — BODY MASS INDEX: 27.59 KG/M2 | WEIGHT: 215 LBS | HEIGHT: 74 IN

## 2022-08-18 DIAGNOSIS — Z96.652 S/P TKR (TOTAL KNEE REPLACEMENT), LEFT: Primary | ICD-10-CM

## 2022-08-18 PROCEDURE — 1123F ACP DISCUSS/DSCN MKR DOCD: CPT | Performed by: ORTHOPAEDIC SURGERY

## 2022-08-18 PROCEDURE — 99212 OFFICE O/P EST SF 10 MIN: CPT | Performed by: ORTHOPAEDIC SURGERY

## 2022-08-18 NOTE — PROGRESS NOTES
Dr Vignesh Vasquez      Date /Time 8/18/2022       9:50 AM EDT  Name Nash Spain             1955   Location  [facility]  MRN [id number]                Chief Complaint   Patient presents with    Follow-up     2nd PO Left TKA SX 03/28/2022        History of Present Illness      Nash Spain is a 77 y.o. male is here for post-op visit after LEFT  59126 Total Knee Arthroplasty    Patient is now 4.5 months status post left total knee arthroplasty. Patient is doing well. Pain controlled. Patient denies any fever, chills or drainage. He is happy with his total knee arthroplasty    Physical Exam    Based off 1997 Exam Criteria    Ht 6' 2\" (1.88 m)   Wt 215 lb (97.5 kg)   BMI 27.60 kg/m²      Constitutional:       General: He is not in acute distress. Appearance: Normal appearance. LEFT Knee: incision clean, intact, healing appropriately. No surrounding  erythema or fluctuance. Neuro intact distal. No evidence of DVT. Range of motion 0 to 125 degrees, stable exam    Imaging       X-rays were ordered today and reviewed of the left knee. 3 views. AP, lateral, and skyline views. They demonstrate a left total knee arthroplasty in good position. No evidence of loosening or periprosthetic fracture. Assessment and Plan    Karen Wood was seen today for follow-up. Diagnoses and all orders for this visit:    S/P TKR (total knee replacement), left  -     XR KNEE LEFT (3 VIEWS); Future    Patient is doing extremely well after his total knee arthroplasty. Would recommend he continue with home exercises to maintain range of motion and strength. I will see the patient back 1 year from surgery or sooner if problems arise. We have discussed predental and preinvasive procedure antibiotics. I discussed with Nash Spain that his history, symptoms, signs, and imaging are most consistent with previous TKA replacement.     We reviewed the natural history of these conditions and treatment options ranging from conservative measures (rest, icing, activity modification, physical therapy, pain meds, cortisone injection) to surgical options. In terms of treatment, I recommended continuing with rest, icing, avoidance of painful activities, NSAIDs or pain meds as tolerated, and physical therapy. If these are not effective, cortisone injection can be considered. We discussed surgical options as well, should conservative measures fail. Electronically signed by Matty Panda MD on 8/18/2022 at 11:39 AM  This dictation was generated by voice recognition computer software. Although all attempts are made to edit the dictation for accuracy, there may be errors in the transcription that are not intended.

## 2022-10-07 ENCOUNTER — OFFICE VISIT (OUTPATIENT)
Dept: FAMILY MEDICINE CLINIC | Age: 67
End: 2022-10-07
Payer: COMMERCIAL

## 2022-10-07 VITALS
SYSTOLIC BLOOD PRESSURE: 134 MMHG | DIASTOLIC BLOOD PRESSURE: 80 MMHG | HEART RATE: 67 BPM | OXYGEN SATURATION: 98 % | RESPIRATION RATE: 16 BRPM | BODY MASS INDEX: 27.6 KG/M2 | TEMPERATURE: 97.1 F | WEIGHT: 215 LBS

## 2022-10-07 DIAGNOSIS — Z01.818 PREOPERATIVE EVALUATION TO RULE OUT SURGICAL CONTRAINDICATION: Primary | ICD-10-CM

## 2022-10-07 DIAGNOSIS — M25.531 ARTHRALGIA OF WRIST, RIGHT: ICD-10-CM

## 2022-10-07 PROCEDURE — 99213 OFFICE O/P EST LOW 20 MIN: CPT | Performed by: STUDENT IN AN ORGANIZED HEALTH CARE EDUCATION/TRAINING PROGRAM

## 2022-10-07 PROCEDURE — 1123F ACP DISCUSS/DSCN MKR DOCD: CPT | Performed by: STUDENT IN AN ORGANIZED HEALTH CARE EDUCATION/TRAINING PROGRAM

## 2022-10-07 PROCEDURE — 93000 ELECTROCARDIOGRAM COMPLETE: CPT | Performed by: STUDENT IN AN ORGANIZED HEALTH CARE EDUCATION/TRAINING PROGRAM

## 2022-10-07 ASSESSMENT — PATIENT HEALTH QUESTIONNAIRE - PHQ9
1. LITTLE INTEREST OR PLEASURE IN DOING THINGS: 0
DEPRESSION UNABLE TO ASSESS: FUNCTIONAL CAPACITY MOTIVATION LIMITS ACCURACY
2. FEELING DOWN, DEPRESSED OR HOPELESS: 0
SUM OF ALL RESPONSES TO PHQ QUESTIONS 1-9: 0
SUM OF ALL RESPONSES TO PHQ QUESTIONS 1-9: 0
SUM OF ALL RESPONSES TO PHQ9 QUESTIONS 1 & 2: 0
SUM OF ALL RESPONSES TO PHQ QUESTIONS 1-9: 0
SUM OF ALL RESPONSES TO PHQ QUESTIONS 1-9: 0

## 2022-10-07 ASSESSMENT — ANXIETY QUESTIONNAIRES
7. FEELING AFRAID AS IF SOMETHING AWFUL MIGHT HAPPEN: 0
2. NOT BEING ABLE TO STOP OR CONTROL WORRYING: 0
5. BEING SO RESTLESS THAT IT IS HARD TO SIT STILL: 0
4. TROUBLE RELAXING: 0
3. WORRYING TOO MUCH ABOUT DIFFERENT THINGS: 0
1. FEELING NERVOUS, ANXIOUS, OR ON EDGE: 0
IF YOU CHECKED OFF ANY PROBLEMS ON THIS QUESTIONNAIRE, HOW DIFFICULT HAVE THESE PROBLEMS MADE IT FOR YOU TO DO YOUR WORK, TAKE CARE OF THINGS AT HOME, OR GET ALONG WITH OTHER PEOPLE: NOT DIFFICULT AT ALL
GAD7 TOTAL SCORE: 0
6. BECOMING EASILY ANNOYED OR IRRITABLE: 0

## 2022-10-07 NOTE — PROGRESS NOTES
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 7    Tendinitis of left rotator cuff    Left rotator cuff tear    Status post right hip replacement 2008    Left knee DJD    Chondromalacia patellae of left knee    Arthritis of left acromioclavicular joint    Tear of lateral meniscus of left knee    Acquired valgus deformity left knee    Tear of medial meniscus of left knee    Biceps tendinitis on left    Labral tear of left shoulder    S/P left knee arthroscopy, chondroplasty, synovectomy, removal loose bodies, and partial medial and lateral meniscectomy 8/20/2014    S/P left rotator cuff repair, arthroscopy, debridement of rotator cuff, Leticia Vazquez, and biceps tenotomy 10/28/2014    CMC arthritis    SLAC (scapholunate advanced collapse) of wrist    Carpal tunnel syndrome, bilateral    Wrist arthritis    Perennial allergic rhinitis    Left wrist pain    Bilateral inguinal hernia without obstruction or gangrene    Localized osteoarthritis of left knee       Past Medical History:   Diagnosis Date    Allergic rhinitis     Arthritis     Osteoarthritis      Past Surgical History:   Procedure Laterality Date    BONE GRAFT      rt wrist  age 27    CARPAL TUNNEL RELEASE Right 03/14/2017    CARPAL TUNNEL RELEASE Left 04/11/2017    with excision volar wrist ganglion    COLONOSCOPY  2008    no polyps    COLONOSCOPY  02/26/2018    diverticulosis    HAND SURGERY Left 12/12/2017    Proximal row carpectomy    HERNIA REPAIR Bilateral 08/23/2018     ROBOTIC [address] REPLACEMENT  2008    rt total hip    KNEE ARTHROSCOPY Left 8/20/2014    LEFT KNEE ARTHROSCOPY, CHONDROPLASTY, SYNOVECTOMY, PARTIAL    ND REPAIR ING HERNIA,5+Y/O,REDUCIBL Bilateral 8/23/2018    ROBOTIC INGUINAL HERNIA REPAIR WITH MESH performed by Megha Medina MD at [address] [place] [address] ARTHROSCOPY Left 10/28/14    debridement,rotator cuff repair    TONSILLECTOMY AND ADENOIDECTOMY Bilateral 1960    TOTAL KNEE ARTHROPLASTY Left 3/28/2022    LEFT TOTAL KNEE ARTHROPLASTY - COMPLEX WITH ADDUCTOR CANAL BLOCK                         MEDACTA performed by Marielos Thibodeaux MD at [facility] History   Problem Relation Age of Onset    Cancer Mother         skin cancer    High Blood Pressure Mother     Cancer Father         bladder    High Blood Pressure Father     No Known Problems Maternal Grandmother     No Known Problems Maternal Grandfather     Cancer Paternal Grandmother         leukemia    No Known Problems Paternal Grandfather     Diabetes Neg Hx     Heart Disease Neg Hx      Social History     Socioeconomic History    Marital status: Single     Spouse name: Not on file    Number of children: Not on file    Years of education: Not on file    Highest education level: Not on file   Occupational History    Not on file   Tobacco Use    Smoking status: Never    Smokeless tobacco: Never   Vaping Use    Vaping Use: Never used   Substance and Sexual Activity    Alcohol use: Yes     Alcohol/week: 0.0 standard drinks     Comment: 1-3 beers a week. Drug use: No    Sexual activity: Yes   Other Topics Concern    Not on file   Social History Narrative    Not on file     Social Determinants of Health     Financial Resource Strain: Low Risk     Difficulty of Paying Living Expenses: Not hard at all   Food Insecurity: No Food Insecurity    Worried About Running Out of Food in the Last Year: Never true    [address] in the Last Year: Never true   Transportation Needs: No Transportation Needs    Lack of Transportation (Medical): No    Lack of Transportation (Non-Medical):  No   Physical Activity: Not on file Stress: Not on file   Social Connections: Not on file   Intimate Partner Violence: Not on file   Housing Stability: Unknown    Unable to Pay for Housing in the Last Year: No    Number of Places Lived in the Last Year: Not on file    Unstable Housing in the Last Year: No         Review of Systems     Constitutional: Negative for activity change, appetite change, chills, diaphoresis, fatigue, fever and unexpected weight change. ENT: No hearing loss, vertigo,  sore throat +nasal congestion chronic   Respiratory: Negative for cough, dyspnea or wheezing  Cardiovascular: Negative for chest pain, palpitations and leg swelling. Gastrointestinal: Negative for abdominal pain, nausea, vomiting, constipation or diarrhea   Genitourinary: Negative for urgency, frequency, dysuria, hematuria  Musculoskeletal: + Right wirst pain,myalgias, or morning stiffness  Skin: Negative for rashes   Neurological: Negative for dizziness, headaches, seizures, weakness. No neurological complaints  Psychiatric/Behavioral: No depression/anxiety/sleep disturbance        Physical Exam   /80 (Site: Left Upper Arm, Position: Sitting)   Pulse 67   Temp 97.1 °F (36.2 °C) (Temporal)   Resp 16   Wt 215 lb (97.5 kg)   SpO2 98%   BMI 27.60 kg/m²   General appearance:  No apparent distress, appears stated age and cooperative. HEENT:  Pupils equal, round, and reactive to light. Extra ocular muscles intact. Conjunctivae/corneas clear. Neck: Supple, with full range of motion. No jugular venous distention. Trachea midline. Respiratory:  Normal respiratory effort. Clear to auscultation, bilaterally without Rales/Wheezes/Rhonchi. Cardiovascular:  Regular rate and rhythm with normal S1/S2 without murmurs, rubs or gallops. Abdomen: Soft, non-tender, non-distended with normal bowel sounds. Musculoskeletal:  No clubbing, cyanosis or edema bilaterally. Full range of motion without deformity.   Skin: Skin color, texture, turgor normal. Neurologic: Grossly normal  Psychiatric:  Alert and oriented, thought content appropriate, normal insight  Capillary Refill: Brisk,3 seconds, normal  Peripheral Pulses: +2 palpable, equal bilaterally       EKG Interpretation:  unchanged from previous tracings. Assessment:      77 y.o. male with planned surgery as above. Known risk factors for perioperative complications: None    Difficulty with intubation will not be anticipated. Cardiac Risk Estimation: per the RCI RISK CLASS I (0 risk factors, risk of major cardiac compl. appr. 0.5%)       Plan:   1. Pre-op exam  - EKG Interpretation:  unchanged from previous tracings. No labs required. - Presently Clinically Stable for Scheduled Surgery. - Avoidance of Aspirin or NSAIDS 7 days prior to surgery.  - No contraindications to planned surgery  -To call with any changes in present status.           Alyson Franco MD  10/7/22

## 2022-10-07 NOTE — PATIENT INSTRUCTIONS
PRE OP INSTRUCTION SHEET  -Do not eat or drink anything after 12 midnight  prior to surgery. This includes no water, chewing gum or mints.  -Aspirin, Ibuprofen, Advil, Naproxen, Vitamin E, fish oil and other Anti-inflammatory products should be stopped for 7 days before surgery or as directed by your physician.  -Do not smoke, and do not drink any alcoholic beverages 24 hours prior to surgery  -. You may brush your teeth and gargle the morning of surgery.   DO NOT SWALLOW WATER

## 2022-10-14 NOTE — PROGRESS NOTES
1.  Do not eat or drink anything after 12 midnight prior to surgery. This includes no water, chewing gum or mints. 2.  Take the following pills with a small sip of water on the morning of surgery   3. Aspirin, Ibuprofen, Advil, Naproxen, Vitamin E and other Anti-inflammatory products should be stopped for 5 days before surgery or as directed by your physician. 4.  Check with your doctor regarding stopping Plavix, Coumadin, Lovenox, Fragmin or other blood thinners. 5.  Do not smoke and do not drink alcoholic beverages 24 hours prior to surgery. This includes NA Beer. 6.  You may brush your teeth and gargle the morning of surgery. DO NOT SWALLOW WATER.  7.  You MUST make arrangements for a responsible adult to take you home after your surgery. You will not be allowed to leave alone or drive yourself home. It is strongly suggested someone stay with you the first 24 hours. Your surgery will be cancelled if you do not have a ride home. 8.  A parent/legal guardian must accompany a child scheduled for surgery and plan to stay at the hospital until the child is discharged. Please do not bring other children with you. 9.  Please wear simple, loose fitting clothing to the hospital.  [place] not bring valuables ( money, credit cards, checkbooks, etc.)  Do not wear any makeup (including no eye makeup) or nail polish on your fingers or toes. 10.  Do not wear any jewelry or piercing on the day of surgery. All body piercing jewelry must be removed. 11.  If you have dentures, they will be removed before going to the OR; we will provide you a container. If you wear contact lenses or glasses, they will be removed; please bring a case for them. 12.  Please see your family doctor/pediatrician for a history & physical and/or concerning medications. Bring any test results/reports from your physician's office the day of surgery. PCP   Phone     H&P appt date   15.   Remember to bring Blood Bank Bracelet to the hospital on the day of surgery. 14.  If you have a Living Will and Durable Power of  for Healthcare, please bring in a copy. 13.  Notify your Surgeon if you develop any illness between now and surgery time; cough, cold, fever, sore throat, nausea, vomiting, etc.  Please notify your surgeon if you experience dizziness, shortness of breath or blurred vision between now and the time of your surgery. 16.  DO NOT shave your operative site 96 hours (4 days) prior to surgery. For face and neck surgery, men may use an electric razor 48 hours (2 days) prior to surgery. 17. Shower the night before surgery and the morning of surgery with   Antibacterial soap   Hibiclens or  Chlorhexidine gluconate. To provide excellent care, visitors will be limited to two in a room at any given time. Please no children under the age of 15 in the surgical department.

## 2022-10-25 ENCOUNTER — TELEPHONE (OUTPATIENT)
Dept: FAMILY MEDICINE CLINIC | Age: 67
End: 2022-10-25

## 2022-10-25 DIAGNOSIS — Z00.00 ROUTINE GENERAL MEDICAL EXAMINATION AT A HEALTH CARE FACILITY: Primary | ICD-10-CM

## 2022-10-25 NOTE — TELEPHONE ENCOUNTER
----- Message from Bean Varghese sent at 10/25/2022  4:46 PM EDT -----  Subject: Referral Request    Reason for referral request? Patient has an appointment on 11/07 and would   like to have the orders in for bloodwork. Please call patient when orders   are in. Provider patient wants to be referred to(if known):     Provider Phone Number(if known): Additional Information for Provider? Patient is having [facility] surgery on   10/28 and he would like to go in on 11/02.  Please call patient on his cell   phone.  ---------------------------------------------------------------------------  --------------  [address]    [phone]; OK to leave message on voicemail  ---------------------------------------------------------------------------  --------------

## 2022-10-25 NOTE — TELEPHONE ENCOUNTER
Not sure which labs you would like ordered?     Future Appointments   Date Time Provider Nabila Emmanuel   11/7/2022  8:00 AM DO INDY Mcintosh

## 2022-10-27 ENCOUNTER — ANESTHESIA EVENT (OUTPATIENT)
Dept: OPERATING ROOM | Age: 67
End: 2022-10-27
Payer: COMMERCIAL

## 2022-10-28 ENCOUNTER — ANESTHESIA (OUTPATIENT)
Dept: OPERATING ROOM | Age: 67
End: 2022-10-28
Payer: COMMERCIAL

## 2022-10-28 ENCOUNTER — HOSPITAL ENCOUNTER (OUTPATIENT)
Age: 67
Setting detail: OUTPATIENT SURGERY
Discharge: HOME | End: 2022-10-28
Attending: ORTHOPAEDIC SURGERY | Admitting: ORTHOPAEDIC SURGERY
Payer: COMMERCIAL

## 2022-10-28 VITALS
HEIGHT: 74 IN | TEMPERATURE: 98 F | RESPIRATION RATE: 20 BRPM | WEIGHT: 210 LBS | DIASTOLIC BLOOD PRESSURE: 80 MMHG | BODY MASS INDEX: 26.95 KG/M2 | HEART RATE: 54 BPM | SYSTOLIC BLOOD PRESSURE: 135 MMHG | OXYGEN SATURATION: 100 %

## 2022-10-28 DIAGNOSIS — M19.039 WRIST ARTHRITIS: Primary | ICD-10-CM

## 2022-10-28 PROCEDURE — 7100000000 HC PACU RECOVERY - FIRST 15 MIN: Performed by: ORTHOPAEDIC SURGERY

## 2022-10-28 PROCEDURE — 64415 NJX AA&/STRD BRCH PLXS IMG: CPT | Performed by: ANESTHESIOLOGY

## 2022-10-28 PROCEDURE — 3700000001 HC ADD 15 MINUTES (ANESTHESIA): Performed by: ORTHOPAEDIC SURGERY

## 2022-10-28 PROCEDURE — 7100000001 HC PACU RECOVERY - ADDTL 15 MIN: Performed by: ORTHOPAEDIC SURGERY

## 2022-10-28 PROCEDURE — 6360000002 HC RX W HCPCS: Performed by: ORTHOPAEDIC SURGERY

## 2022-10-28 PROCEDURE — 2580000003 HC RX 258: Performed by: ANESTHESIOLOGY

## 2022-10-28 PROCEDURE — 6360000002 HC RX W HCPCS: Performed by: ANESTHESIOLOGY

## 2022-10-28 PROCEDURE — 7100000010 HC PHASE II RECOVERY - FIRST 15 MIN: Performed by: ORTHOPAEDIC SURGERY

## 2022-10-28 PROCEDURE — 2500000003 HC RX 250 WO HCPCS: Performed by: NURSE ANESTHETIST, CERTIFIED REGISTERED

## 2022-10-28 PROCEDURE — 3600000014 HC SURGERY LEVEL 4 ADDTL 15MIN: Performed by: ORTHOPAEDIC SURGERY

## 2022-10-28 PROCEDURE — 7100000011 HC PHASE II RECOVERY - ADDTL 15 MIN: Performed by: ORTHOPAEDIC SURGERY

## 2022-10-28 PROCEDURE — 2500000003 HC RX 250 WO HCPCS: Performed by: ANESTHESIOLOGY

## 2022-10-28 PROCEDURE — 6360000002 HC RX W HCPCS: Performed by: NURSE ANESTHETIST, CERTIFIED REGISTERED

## 2022-10-28 PROCEDURE — 2709999900 HC NON-CHARGEABLE SUPPLY: Performed by: ORTHOPAEDIC SURGERY

## 2022-10-28 PROCEDURE — 3700000000 HC ANESTHESIA ATTENDED CARE: Performed by: ORTHOPAEDIC SURGERY

## 2022-10-28 PROCEDURE — 3600000004 HC SURGERY LEVEL 4 BASE: Performed by: ORTHOPAEDIC SURGERY

## 2022-10-28 PROCEDURE — C1713 ANCHOR/SCREW BN/BN,TIS/BN: HCPCS | Performed by: ORTHOPAEDIC SURGERY

## 2022-10-28 RX ORDER — HYDRALAZINE HYDROCHLORIDE 20 MG/ML
5 INJECTION INTRAMUSCULAR; INTRAVENOUS EVERY 10 MIN PRN
Status: CANCELLED | OUTPATIENT
Start: 2022-10-28

## 2022-10-28 RX ORDER — SODIUM CHLORIDE 0.9 % (FLUSH) 0.9 %
5-40 SYRINGE (ML) INJECTION EVERY 12 HOURS SCHEDULED
Status: DISCONTINUED | OUTPATIENT
Start: 2022-10-28 | End: 2022-10-28 | Stop reason: HOSPADM

## 2022-10-28 RX ORDER — SODIUM CHLORIDE 9 MG/ML
INJECTION, SOLUTION INTRAVENOUS PRN
Status: DISCONTINUED | OUTPATIENT
Start: 2022-10-28 | End: 2022-10-28 | Stop reason: HOSPADM

## 2022-10-28 RX ORDER — HYDRALAZINE HYDROCHLORIDE 20 MG/ML
10 INJECTION INTRAMUSCULAR; INTRAVENOUS
Status: DISCONTINUED | OUTPATIENT
Start: 2022-10-28 | End: 2022-10-28 | Stop reason: HOSPADM

## 2022-10-28 RX ORDER — ONDANSETRON 2 MG/ML
4 INJECTION INTRAMUSCULAR; INTRAVENOUS
Status: DISCONTINUED | OUTPATIENT
Start: 2022-10-28 | End: 2022-10-28 | Stop reason: HOSPADM

## 2022-10-28 RX ORDER — ONDANSETRON 2 MG/ML
INJECTION INTRAMUSCULAR; INTRAVENOUS PRN
Status: DISCONTINUED | OUTPATIENT
Start: 2022-10-28 | End: 2022-10-28 | Stop reason: SDUPTHER

## 2022-10-28 RX ORDER — MEPERIDINE HYDROCHLORIDE 25 MG/ML
12.5 INJECTION INTRAMUSCULAR; INTRAVENOUS; SUBCUTANEOUS EVERY 5 MIN PRN
Status: DISCONTINUED | OUTPATIENT
Start: 2022-10-28 | End: 2022-10-28 | Stop reason: HOSPADM

## 2022-10-28 RX ORDER — DEXAMETHASONE SODIUM PHOSPHATE 4 MG/ML
4 INJECTION, SOLUTION INTRA-ARTICULAR; INTRALESIONAL; INTRAMUSCULAR; INTRAVENOUS; SOFT TISSUE
Status: DISCONTINUED | OUTPATIENT
Start: 2022-10-28 | End: 2022-10-28 | Stop reason: HOSPADM

## 2022-10-28 RX ORDER — SODIUM CHLORIDE, SODIUM LACTATE, POTASSIUM CHLORIDE, CALCIUM CHLORIDE 600; 310; 30; 20 MG/100ML; MG/100ML; MG/100ML; MG/100ML
INJECTION, SOLUTION INTRAVENOUS CONTINUOUS
Status: DISCONTINUED | OUTPATIENT
Start: 2022-10-28 | End: 2022-10-28 | Stop reason: HOSPADM

## 2022-10-28 RX ORDER — ACETAMINOPHEN 325 MG/1
650 TABLET ORAL
Status: DISCONTINUED | OUTPATIENT
Start: 2022-10-28 | End: 2022-10-28 | Stop reason: HOSPADM

## 2022-10-28 RX ORDER — OXYCODONE HYDROCHLORIDE AND ACETAMINOPHEN 5; 325 MG/1; MG/1
1 TABLET ORAL EVERY 6 HOURS PRN
Qty: 28 TABLET | Refills: 0 | Status: SHIPPED | OUTPATIENT
Start: 2022-10-28 | End: 2022-11-04

## 2022-10-28 RX ORDER — DIPHENHYDRAMINE HYDROCHLORIDE 50 MG/ML
12.5 INJECTION INTRAMUSCULAR; INTRAVENOUS
Status: DISCONTINUED | OUTPATIENT
Start: 2022-10-28 | End: 2022-10-28 | Stop reason: HOSPADM

## 2022-10-28 RX ORDER — FENTANYL CITRATE 50 UG/ML
INJECTION, SOLUTION INTRAMUSCULAR; INTRAVENOUS PRN
Status: DISCONTINUED | OUTPATIENT
Start: 2022-10-28 | End: 2022-10-28 | Stop reason: SDUPTHER

## 2022-10-28 RX ORDER — SODIUM CHLORIDE 0.9 % (FLUSH) 0.9 %
5-40 SYRINGE (ML) INJECTION PRN
Status: DISCONTINUED | OUTPATIENT
Start: 2022-10-28 | End: 2022-10-28 | Stop reason: HOSPADM

## 2022-10-28 RX ORDER — MIDAZOLAM HYDROCHLORIDE 1 MG/ML
2 INJECTION INTRAMUSCULAR; INTRAVENOUS
Status: DISCONTINUED | OUTPATIENT
Start: 2022-10-28 | End: 2022-10-28 | Stop reason: HOSPADM

## 2022-10-28 RX ORDER — LIDOCAINE HYDROCHLORIDE 10 MG/ML
1 INJECTION, SOLUTION EPIDURAL; INFILTRATION; INTRACAUDAL; PERINEURAL
Status: COMPLETED | OUTPATIENT
Start: 2022-10-28 | End: 2022-10-28

## 2022-10-28 RX ORDER — FENTANYL CITRATE 50 UG/ML
INJECTION, SOLUTION INTRAMUSCULAR; INTRAVENOUS
Status: DISCONTINUED
Start: 2022-10-28 | End: 2022-10-28 | Stop reason: HOSPADM

## 2022-10-28 RX ORDER — ROPIVACAINE HYDROCHLORIDE 5 MG/ML
INJECTION, SOLUTION EPIDURAL; INFILTRATION; PERINEURAL
Status: DISCONTINUED
Start: 2022-10-28 | End: 2022-10-28 | Stop reason: HOSPADM

## 2022-10-28 RX ORDER — ROPIVACAINE HYDROCHLORIDE 5 MG/ML
INJECTION, SOLUTION EPIDURAL; INFILTRATION; PERINEURAL
Status: COMPLETED | OUTPATIENT
Start: 2022-10-28 | End: 2022-10-28

## 2022-10-28 RX ORDER — LIDOCAINE HYDROCHLORIDE 20 MG/ML
INJECTION, SOLUTION INFILTRATION; PERINEURAL PRN
Status: DISCONTINUED | OUTPATIENT
Start: 2022-10-28 | End: 2022-10-28 | Stop reason: SDUPTHER

## 2022-10-28 RX ORDER — PROPOFOL 10 MG/ML
INJECTION, EMULSION INTRAVENOUS PRN
Status: DISCONTINUED | OUTPATIENT
Start: 2022-10-28 | End: 2022-10-28 | Stop reason: SDUPTHER

## 2022-10-28 RX ORDER — OXYCODONE HYDROCHLORIDE 5 MG/1
5 TABLET ORAL
Status: DISCONTINUED | OUTPATIENT
Start: 2022-10-28 | End: 2022-10-28 | Stop reason: HOSPADM

## 2022-10-28 RX ORDER — DEXAMETHASONE SODIUM PHOSPHATE 4 MG/ML
INJECTION, SOLUTION INTRA-ARTICULAR; INTRALESIONAL; INTRAMUSCULAR; INTRAVENOUS; SOFT TISSUE PRN
Status: DISCONTINUED | OUTPATIENT
Start: 2022-10-28 | End: 2022-10-28 | Stop reason: SDUPTHER

## 2022-10-28 RX ORDER — IPRATROPIUM BROMIDE AND ALBUTEROL SULFATE 2.5; .5 MG/3ML; MG/3ML
1 SOLUTION RESPIRATORY (INHALATION)
Status: DISCONTINUED | OUTPATIENT
Start: 2022-10-28 | End: 2022-10-28 | Stop reason: HOSPADM

## 2022-10-28 RX ADMIN — PROPOFOL 25 MG: 10 INJECTION, EMULSION INTRAVENOUS at 08:12

## 2022-10-28 RX ADMIN — ONDANSETRON HYDROCHLORIDE 4 MG: 2 INJECTION, SOLUTION INTRAMUSCULAR; INTRAVENOUS at 07:20

## 2022-10-28 RX ADMIN — PROPOFOL 50 MG: 10 INJECTION, EMULSION INTRAVENOUS at 07:32

## 2022-10-28 RX ADMIN — PROPOFOL 25 MG: 10 INJECTION, EMULSION INTRAVENOUS at 08:07

## 2022-10-28 RX ADMIN — PROPOFOL 25 MG: 10 INJECTION, EMULSION INTRAVENOUS at 08:16

## 2022-10-28 RX ADMIN — ROPIVACAINE HYDROCHLORIDE 30 ML: 5 INJECTION, SOLUTION EPIDURAL; INFILTRATION; PERINEURAL at 06:45

## 2022-10-28 RX ADMIN — FENTANYL CITRATE 25 MCG: 50 INJECTION INTRAMUSCULAR; INTRAVENOUS at 07:51

## 2022-10-28 RX ADMIN — LIDOCAINE HYDROCHLORIDE 100 MG: 20 INJECTION, SOLUTION INFILTRATION; PERINEURAL at 07:14

## 2022-10-28 RX ADMIN — FENTANYL CITRATE 25 MCG: 50 INJECTION INTRAMUSCULAR; INTRAVENOUS at 07:57

## 2022-10-28 RX ADMIN — LIDOCAINE HYDROCHLORIDE 0.1 ML: 10 INJECTION, SOLUTION EPIDURAL; INFILTRATION; INTRACAUDAL; PERINEURAL at 06:26

## 2022-10-28 RX ADMIN — PROPOFOL 50 MG: 10 INJECTION, EMULSION INTRAVENOUS at 07:56

## 2022-10-28 RX ADMIN — PROPOFOL 200 MG: 10 INJECTION, EMULSION INTRAVENOUS at 07:15

## 2022-10-28 RX ADMIN — SODIUM CHLORIDE, SODIUM LACTATE, POTASSIUM CHLORIDE, AND CALCIUM CHLORIDE: 600; 310; 30; 20 INJECTION, SOLUTION INTRAVENOUS at 06:28

## 2022-10-28 RX ADMIN — Medication 1500 MG: at 06:52

## 2022-10-28 RX ADMIN — FENTANYL CITRATE 50 MCG: 50 INJECTION INTRAMUSCULAR; INTRAVENOUS at 07:45

## 2022-10-28 RX ADMIN — DEXAMETHASONE SODIUM PHOSPHATE 8 MG: 4 INJECTION, SOLUTION INTRAMUSCULAR; INTRAVENOUS at 07:20

## 2022-10-28 RX ADMIN — SODIUM CHLORIDE, SODIUM LACTATE, POTASSIUM CHLORIDE, AND CALCIUM CHLORIDE: 600; 310; 30; 20 INJECTION, SOLUTION INTRAVENOUS at 07:11

## 2022-10-28 RX ADMIN — HYDRALAZINE HYDROCHLORIDE 5 MG: 20 INJECTION INTRAMUSCULAR; INTRAVENOUS at 09:17

## 2022-10-28 ASSESSMENT — PAIN SCALES - GENERAL
PAINLEVEL_OUTOF10: 0

## 2022-10-28 ASSESSMENT — PAIN DESCRIPTION - DESCRIPTORS: DESCRIPTORS: ACHING

## 2022-10-28 ASSESSMENT — PAIN - FUNCTIONAL ASSESSMENT
PAIN_FUNCTIONAL_ASSESSMENT: PREVENTS OR INTERFERES WITH MANY ACTIVE NOT PASSIVE ACTIVITIES
PAIN_FUNCTIONAL_ASSESSMENT: 0-10

## 2022-10-28 NOTE — PROGRESS NOTES
Arrived via stretcher from OR. Patient sleeping. Right forearm to hand dressing dry and intact. Right hand fingers warm to touch, adequate capillary refill. Report received from  Ashanti [place] and [employer].

## 2022-10-28 NOTE — OP NOTE
Patient:   Selwyn Ochoa  Date of Surgery:  10/28/2022      Pre-Op Diagnoses:  1.   right trigger thumb  2. 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 Same  2. Same        Procedure(s) Performed:  1.  right trigger thumb release, A1 pulley release  2. Right wrist proximal row carpectomy  3. Right wrist posterior interosseous nerve neurectomy to help with long-term wrist pain control  4. Right wrist radial styloidectomy  5. X-ray right wrist with intraoperative interpretation by the surgeon        Surgeon: Riri Penn MD   Assist:  [facility]    Anesthesia Type:   General and regional    Blood Loss:   Minimal    Pathology:   None    Complications:   None    Antibiotics:  See Anesthesia note    The surgical site was marked in preop holding by Dr Linnea King. Informed/signed consent was on the chart. All risks and benefits were discussed preoperatively. The patient was brought to the operating and room and placed in the supine position. After the induction of anesthesia a standard time-out was performed. Description of the Procedure: Following the injection, the right upper extremity was prepped and draped in the normal sterile fashion. Antibiotics were in place. Following a final timeout, the right upper extremity was exsanguinated and the tourniquet inflated to 250 mmHg. A standard 1.5 cm incision was made in the MP crease of the thumb, through skin only. Blunt scissor dissection was taken through subcutaneous tissue and the flexor tendon sheath was identified and isolated. Both neurovascular bundles on either side of the flexor tendon sheath were protected with blunt retractors, carefully. The A1 pulley was visualized and isolated. It was quick thick. Under direct visualization, the A1 pulley was released with a #15 blade and small scissors while protecting the underlying tendon and nearby neurovascular structures.   The oblique pulley was protected. The FPL tendon had excellent excursion up through the wound at this point. Gentle motion of the thumb resulted in a smooth glide of the tendon, no clicking or locking. No other abnormalities were noted. The wound was copiously irrigated and the incision closed with interrupted nylon suture. Separate incision made dorsal central wrist through skin only, approximately 8 to 10 inches. Full-thickness skin flaps developed with meticulous hemostasis while protecting dorsal sensory nerves. Extensor retinaculum opened, tendons temporarily transposed and protected with blunt retractors. Posterior interosseous nerve was identified and isolated. To help with long-term wrist pain controlled neurectomy performed of about 1 inch segment of the nerve just proximal to the joint, discarded. Ligamentous sparing capsulotomy created. Advanced arthrosis of the wrist joint noted, especially between the scaphoid and radius consistent with chronic SL tear. Proximal row carpectomy performed, verified visually, with palpation and x-ray. X-rays saved of the right wrist throughout the case. Proximal capitate cartilage was intact as was the lunate fossa and therefore resurfacing not done at this point of the capitate. Radial and ulnar deviation performed, impingement radially noted. Radial styloidectomy of 4 mm undertaken under direct visualization while protecting the soft tissue structures. Patient had gouty type infiltration of some of the soft tissues and capsule, what was safe to remove was removed, to prevent any instability. Wound and joint thoroughly irrigated. No other abnormalities noted. Tight capsular closure with Vicryl. Extensor tendons placed and extensor retinaculum closed with the EPL transposed. Final wound irrigation followed by standard layered closure. Tourniquet deflated, compartments were soft and the soft sterile dressing placed.   Volar splint had also been placed. Patient was awoken from sedation, tolerated the case well, was taken to the postanesthesia recovery unit in good condition. No complications. Findings:           Addendum:  Intraoperative x-ray right wrist utilized verifying proximal row carpectomy and alignment of the capitate within the lunate fossa. 3 views saved and printed intraoperative interpretation by the surgeon. Intervention:      Other Notes: Follow-up in 7-10 days for wound inspection and suture removal.  X-ray right wrist first postoperative visit. Patient will be taught a range of motion, scar massage, tendon gliding exercises. Postoperative hand therapy has been ordered, brace for 8 weeks.         Mayelin Zabala MD  Hand & Upper Extremity Surgery   [facility]

## 2022-10-28 NOTE — BRIEF OP NOTE
Brief Postoperative Note      Patient: Michell Thomas  YOB: 1955  MRN: [id number]    Date of Procedure: 10/28/2022    Pre-Op Diagnosis: Slac (scapholunate advanced collapse) of wrist, right [M19.132]    Post-Op Diagnosis: Same       Procedure(s):  RIGHT WRIST PROXIMAL ROW CARPECTOMY WITH PIN NEURECTOMY, RELEASE RIGHT TRIGGER THUMB, POSSIBLE RESURFACING PROXIMAL CAPITATE  --BLOCK--  Xray right wrist  Right wrist radial styloidectomy    Surgeon(s):  Floresita Hansen MD    Assistant:  [facility]    Anesthesia: General and regional    Estimated Blood Loss (mL): less than 50     Complications: None    Specimens:   * No specimens in log *    Implants:  * No implants in log *      Drains: * No LDAs found *    Findings: see full op note    Electronically signed by Floresita Hansen MD on 10/28/2022 at 8:09 AM

## 2022-10-28 NOTE — ANESTHESIA PROCEDURE NOTES
Peripheral Block    Patient location during procedure: pre-op  Reason for block: post-op pain management and at surgeon's request  Start time: 10/28/2022 6:45 AM  End time: 10/28/2022 6:55 AM  Staffing  Performed: anesthesiologist   Anesthesiologist: Kathryn Stone MD  Preanesthetic Checklist  Completed: patient identified, IV checked, site marked, risks and benefits discussed, surgical/procedural consents, equipment checked, pre-op evaluation, timeout performed, anesthesia consent given, oxygen available and monitors applied/VS acknowledged  Peripheral Block   Patient position: sitting  Prep: ChloraPrep  Provider prep: sterile gloves  Patient monitoring: continuous pulse ox, oxygen and responsive to questions  Block type: Brachial plexus  Supraclavicular  Laterality: right  Injection technique: single-shot  Guidance: nerve stimulator and ultrasound guided  Infiltration strength: 1 %  Dose: 1 mL    Needle   Needle type: insulated echogenic nerve stimulator needle   Needle gauge: 22 G  Needle localization: anatomical landmarks and ultrasound guidance  Needle insertion depth: 4 cm  Needle length: 10 cm  Assessment   Injection assessment: negative aspiration for heme, no paresthesia on injection, local visualized surrounding nerve on ultrasound and no intravascular symptoms  Paresthesia pain: none  Slow fractionated injection: yes  Hemodynamics: stable  Real-time US image taken/store: yes  Outcomes: uncomplicated    Medications Administered  ropivacaine (NAROPIN) injection 0.5% - Perineural, Neck   30 mL - 10/28/2022 6:45:00 AM

## 2022-10-28 NOTE — PROGRESS NOTES
Transferred to phase 2. Denies pain, alert, drinking Sprite. Right hand, forearm dressing dry and intact. Right fingers warm, adequate capillary refill. Arm elevated on pillow ice in place.

## 2022-10-28 NOTE — H&P
I have reviewed the History & Physical and examined the patient and find no relevant changes. I have reviewed with the patient and/or family the risks, benefits, and alternatives to the procedure(s). All questions and concerns were addressed. Consent is on the chart. Surgical site has been marked by Dr Rodney Parkinson and confirmed by the patient. All questions and concerns were addressed today. Patient is in agreement with the plan. Cathy Padilla MD  Hand & Upper Extremity Surgery            Please note that this transcription was created using voice recognition software. Any errors are unintentional and may be due to voice recognition transcription.

## 2022-10-28 NOTE — PROGRESS NOTES
Discharge instructions given to family / . Verbalized understanding of instructions and written instructions given. Sling applied. BP remains in acceptable range, Dr Raul Conte notified. Discharged per wheelchair. Assessment unchanged except as noted.

## 2022-10-28 NOTE — DISCHARGE INSTRUCTIONS
Post op Instructions for Upper Extremity Surgery     * Keep dressing dry and clean. It is ok to shower just keep dressing dry. * Elevate operative arm, attempt to be above heart level. * Ice 20 minutes on 20 minutes off, 3 times per day, first 1-2 days postoperatively. * Follow-up appointment as scheduled by office staff. Check paperwork from office. If no appointment scheduled call the office. * If dressing is too tight, you may loosen Ace bandage and leave splint in place. * Sling, as needed  * Light activities, no heavy lifting. * Finger motion encouraged. * If you have any questions, refer to the office number listed below. [phone] ([facility]) or [phone] Dawne Libman)         Lisset Sweeney MD  Hand & Upper Extremity Surgery  [facility]      PERIPHERAL NERVE BLOCK INSTRUCTIONS     Please remember while having a nerve block you are at an increased risk for [address]!! You were given a nerve block today from the anesthesiologist. Most nerve blocks last anywhere from 6-36 hours. You should start taking your pain medication before the block wears off or when you first begin feeling discomfort. It takes at least 30-60 minutes for a pain pill to take effect. Pain medications should be taken with food. Consider setting an alarm through the night to help manage your pain level so you do not wake up with too much pain. Pain medicines can cause more sedation and decrease your breathing so ONLY take as directed. If you have Sleep Apnea, you definitely need to use your C-Pap machine. What to expect after a nerve block:   Numbness, tingling- arm or leg feels heavy or asleep  Weakness or inability to move or control your arm or leg   Inability to feel temperature changes to your arm or leg  Usually the weakness wears off first, followed by the tingling or heaviness.  You may notice more pain at this point and should start taking your pain meds. If you had a shoulder block, you may experience:  Mild shortness of breath (may be relieved by sitting up in a chair or recliner)  Hoarse voice  Blurry vision  Unequal pupils  Drooping of your face (eye or lip) on the same side as the nerve block  Swelling at the injection site on the side of your neck  These side effects should resolve as the block wears off! IF you have severe or prolonged shortness of breath-  GO to the nearest Emergency Room! If you had a nerve block of your arm or leg:  Protect the arm or leg from extreme hot or cold temperatures  Protect the arm or leg from obstructing blood flow by frequent position changes- Make sure fingers/ toes stay pink and warm. Call surgeon with any changes  For leg block, get assistance walking until the block wears off, ie:  crutches, walker, support person   If you continue to feel the effects of the nerve block for longer than 72 hours- call [facility] at [phone] and ask to speak with the Anesthesiologist on call. ANESTHESIA DISCHARGE INSTRUCTIONS    You are under the influence of drugs- do not drink alcohol, drive a car, operate machinery(such as power tools, kitchen appliances, etc), sign legal documents, or make any important decisions for 24 hours (or while on pain medications). Children should not ride bikes or [place] or play on gym sets  for 24 hours after surgery. A responsible adult should be with you for 24 hours. Rest at home today- increase activity as tolerated. Progress slowly to a regular diet unless your physician has instructed you otherwise. Drink plenty of water. CALL YOUR DOCTOR IF YOU:  Have moderate to severe nausea or vomiting AND are unable to hold down fluids or prescribed medications. Have bright red bloody drainage from your dressing that won't stop oozing.   Do not get relief with your pain medication    NORMAL (POSSIBLE) SIDE EFFECTS FROM ANESTHESIA:     Confusion, temporary memory loss, delayed reaction times in the first 24 hours  Lightheadedness, dizziness, difficulty focusing, blurred vision  Nausea/vomiting can happen  Shivering, feeling cold, sore throat, cough and muscle aches should stop within 24-48 hours  Trouble urinating - call your surgeon if it has been more than 8 hrs  Bruising or soreness at the IV site - call if it remains red, firm or there is drainage         The following instructions are to be followed if you have a known history or diagnosis of sleep apnea: For all sleep apnea patients:  ? Sleep on your side or sitting up in a chair whenever possible, especially the first 24 hours after surgery. ? Use only medicines prescribed by your doctor. ? Do not drink alcohol. ? If you have a dental device to assist you while at rest, use it at all times for the first 24 hours. For patients using CPAP machines:  ? Use your CPAP machine during all periods of sleep as usual.  ? Use your CPAP machine during all periods of daytime rest while on pain medicines. ** Follow up with your primary care doctor for continued care. IF YOU DO NOT TAKE ALL OF YOUR NARCOTIC PAIN MEDICATION, please dispose of them responsibly. There are drop off boxes in the Emergency Departments 24/7 at both [facility] and [place]. If these locations are not convenient, other options for discarding them can be found at:  [email or web address]    Hospital or office staff may NOT accept any medications to drop off in the cabinet for you.

## 2022-10-28 NOTE — ANESTHESIA POSTPROCEDURE EVALUATION
Department of Anesthesiology  Postprocedure Note    Patient: Alpa Harris  MRN: [id number]  YOB: 1955  Date of evaluation: 10/28/2022      Procedure Summary     Date: 10/28/22 Room / Location: [place] [address] / [facility]    Anesthesia Start: 9301 Anesthesia Stop: Meganrett Scriptmanan    Procedure: RIGHT WRIST PROXIMAL ROW CARPECTOMY WITH PIN NEURECTOMY, RELEASE RIGHT TRIGGER THUMB, POSSIBLE RESURFACING PROXIMAL CAPITATE  --BLOCK-- (Right: Wrist) Diagnosis:       Slac (scapholunate advanced collapse) of wrist, left      (Slac (scapholunate advanced collapse) of wrist, left [O13.472])    Surgeons: Priscilla Strange MD Responsible Provider: Sonya Blackmon MD    Anesthesia Type: general ASA Status: 2          Anesthesia Type: No value filed.     Sherif Phase I: Sherif Score: 6    Sherif Phase II: Sherif Score: 9      Anesthesia Post Evaluation    Patient location during evaluation: PACU  Patient participation: complete - patient participated  Level of consciousness: awake  Pain score: 0  Airway patency: patent  Nausea & Vomiting: no nausea  Complications: no  Cardiovascular status: blood pressure returned to baseline  Respiratory status: acceptable  Hydration status: euvolemic

## 2022-10-28 NOTE — ANESTHESIA PRE PROCEDURE
Department of Anesthesiology  Preprocedure Note       Name:  Alpa Harris   Age:  77 y.o.  :  1955                                          MRN:  [id number]         Date:  10/28/2022      Surgeon: Sang Howard):  Priscilla Strange MD    Procedure: Procedure(s):  RIGHT WRIST PROXIMAL ROW CARPECTOMY WITH PIN NEURECTOMY, RELEASE RIGHT TRIGGER THUMB, POSSIBLE RESURFACING PROXIMAL CAPITATE  --BLOCK--    Medications prior to admission:   Prior to Admission medications    Medication Sig Start Date End Date Taking? Authorizing Provider   Misc Natural Products (TART CHERRY ADVANCED) CAPS Take 1 capsule by mouth daily     Historical Provider, MD   famotidine (PEPCID) 20 MG tablet Take 1 tablet by mouth 2 times daily  Patient not taking: Reported on 10/28/2022 1/25/18   Burnis Man, DO   calcium carbonate (OSCAL) 500 MG TABS tablet Take 500 mg by mouth 3 times daily     Historical Provider, MD   Glucosamine-Chondroitin (GLUCOSAMINE CHONDR COMPLEX PO) Take 1 tablet by mouth daily     Historical Provider, MD       Current medications:    Current Facility-Administered Medications   Medication Dose Route Frequency Provider Last Rate Last Admin    vancomycin 1500 mg in dextrose 5% 300 mL IVPB  1,500 mg IntraVENous Once Priscilla Strange MD        lactated ringers infusion   IntraVENous Continuous Beatirce Craven  mL/hr at 10/28/22 0628 New Bag at 10/28/22 0628    sodium chloride flush 0.9 % injection 5-40 mL  5-40 mL IntraVENous 2 times per day Beatrice Craven MD        sodium chloride flush 0.9 % injection 5-40 mL  5-40 mL IntraVENous PRN Beatrice Craven MD        0.9 % sodium chloride infusion   IntraVENous PRN Beatrice Craven MD           Allergies:     Allergies   Allergen Reactions    Naprosyn [Naproxen] Rash       Problem List:    Patient Active Problem List   Diagnosis Code    Pain in joint, lower leg left M25.569    Pain in joint, shoulder region left M25.519    Neck pain M54.2    Neck arthritis C5 6 and 7 M47.812    Tendinitis of left rotator cuff M75.82    Left rotator cuff tear M75.102    Status post right hip replacement 2008 Z96.641    Left knee DJD M17.12    Chondromalacia patellae of left knee M22.42    Arthritis of left acromioclavicular joint M19.012    Tear of lateral meniscus of left knee S83.282A    Acquired valgus deformity left knee M21.069    Tear of medial meniscus of left knee S83.242A    Biceps tendinitis on left M75.22    Labral tear of left shoulder S43.439A    S/P left knee arthroscopy, chondroplasty, synovectomy, removal loose bodies, and partial medial and lateral meniscectomy 8/20/2014 Z98.890    S/P left rotator cuff repair, arthroscopy, debridement of rotator cuff, Taylor, Neer, and biceps tenotomy 10/28/2014 Z98.890    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    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    Carpal tunnel syndrome, bilateral G56.03    Wrist arthritis M19.039    Perennial allergic rhinitis J30.89    Left wrist pain M25.532    Bilateral inguinal hernia without obstruction or gangrene K40.20    Localized osteoarthritis of left knee M17.12       Past Medical History:        Diagnosis Date    Allergic rhinitis     Arthritis     Osteoarthritis        Past Surgical History:        Procedure Laterality Date    BONE GRAFT      rt wrist  age 27   [de-identified] CARPAL TUNNEL RELEASE Right 03/14/2017    CARPAL TUNNEL RELEASE Left 04/11/2017    with excision volar wrist ganglion    COLONOSCOPY  2008    no polyps    COLONOSCOPY  02/26/2018    diverticulosis    HAND SURGERY Left 12/12/2017    Proximal row carpectomy    HERNIA REPAIR Bilateral 08/23/2018     ROBOTIC INGUINAL HERNIA REPAIR WITH MESH    JOINT REPLACEMENT  2008    rt total hip    KNEE ARTHROSCOPY Left 8/20/2014    LEFT KNEE ARTHROSCOPY, CHONDROPLASTY, SYNOVECTOMY, PARTIAL    IN REPAIR ING HERNIA,5+Y/O,REDUCIBL Bilateral 8/23/2018    ROBOTIC INGUINAL HERNIA REPAIR WITH MESH performed by Broderick Franklin MD at [place] [address] ARTHROSCOPY Left 10/28/14    debridement,rotator cuff repair    TONSILLECTOMY AND ADENOIDECTOMY Bilateral 1960    TOTAL KNEE ARTHROPLASTY Left 3/28/2022    LEFT TOTAL KNEE ARTHROPLASTY - COMPLEX WITH ADDUCTOR CANAL BLOCK                         MEDACTA performed by Bernice Allan MD at [facility] History:    Social History     Tobacco Use    Smoking status: Never    Smokeless tobacco: Never   Substance Use Topics    Alcohol use: Yes     Alcohol/week: 0.0 standard drinks     Comment: 1-3 beers a week. Counseling given: Not Answered      Vital Signs (Current):   Vitals:    10/28/22 0605 10/28/22 0613   BP:  133/83   Pulse:  66   Resp:  12   Temp:  (!) 46 °F (7.8 °C)   TempSrc:  Temporal   SpO2:  99%   Weight: 210 lb (95.3 kg)    Height: 6' 2\" (1.88 m)                                               BP Readings from Last 3 Encounters:   10/28/22 133/83   10/07/22 134/80   03/28/22 138/79       NPO Status: Time of last liquid consumption: 2100                        Time of last solid consumption: 1830                        Date of last liquid consumption: 10/27/22                        Date of last solid food consumption: 10/27/22    BMI:   Wt Readings from Last 3 Encounters:   10/28/22 210 lb (95.3 kg)   10/07/22 215 lb (97.5 kg)   08/18/22 215 lb (97.5 kg)     Body mass index is 26.96 kg/m².     CBC:   Lab Results   Component Value Date/Time    WBC 6.3 03/11/2022 08:39 AM    RBC 5.08 03/11/2022 08:39 AM    HGB 15.9 03/11/2022 08:39 AM    HCT 46.3 03/11/2022 08:39 AM    MCV 91.1 03/11/2022 08:39 AM    RDW 12.7 03/11/2022 08:39 AM     03/11/2022 08:39 AM       CMP:   Lab Results   Component Value Date/Time     03/11/2022 08:39 AM    K 4.8 03/11/2022 08:39 AM     03/11/2022 08:39 AM    CO2 25 03/11/2022 08:39 AM    BUN 18 03/11/2022 08:39 AM    CREATININE 1.0 03/11/2022 08:39 AM    GFRAA >60 03/11/2022 08:39 AM    AGRATIO 1.8 11/01/2021 08:12 AM    LABGLOM >60 03/11/2022 08:39 AM    GLUCOSE 105 03/11/2022 08:39 AM    PROT 6.7 11/01/2021 08:12 AM    CALCIUM 9.7 03/11/2022 08:39 AM    BILITOT 0.6 11/01/2021 08:12 AM    ALKPHOS 90 11/01/2021 08:12 AM    AST 19 11/01/2021 08:12 AM    ALT 8 11/01/2021 08:12 AM       POC Tests: No results for input(s): POCGLU, POCNA, POCK, POCCL, POCBUN, POCHEMO, POCHCT in the last 72 hours. Coags:   Lab Results   Component Value Date/Time    PROTIME 11.4 03/11/2022 08:39 AM    INR 1.01 03/11/2022 08:39 AM    APTT 39.2 03/11/2022 08:39 AM       HCG (If Applicable): No results found for: PREGTESTUR, PREGSERUM, HCG, HCGQUANT     ABGs: No results found for: PHART, PO2ART, EFZ9OJE, OGR1AWP, BEART, W3TDFXZE     Type & Screen (If Applicable):  No results found for: LABABO, LABRH    Drug/Infectious Status (If Applicable):  No results found for: HIV, HEPCAB    COVID-19 Screening (If Applicable):   Lab Results   Component Value Date/Time    COVID19 Not Detected 03/22/2022 08:53 AM           Anesthesia Evaluation  Patient summary reviewed and Nursing notes reviewed  Airway: Mallampati: II  TM distance: >3 FB   Neck ROM: full  Mouth opening: > = 3 FB   Dental: normal exam         Pulmonary:Negative Pulmonary ROS and normal exam                               Cardiovascular:Negative CV ROS                      Neuro/Psych:   (+) neuromuscular disease:,             GI/Hepatic/Renal: Neg GI/Hepatic/Renal ROS            Endo/Other: Negative Endo/Other ROS                    Abdominal:             Vascular: negative vascular ROS. Other Findings:           Anesthesia Plan      general     ASA 2       Induction: intravenous. MIPS: Postoperative opioids intended. Anesthetic plan and risks discussed with patient. Plan discussed with CRNA.     Attending anesthesiologist reviewed and agrees with Preprocedure content                SHINE Ferreira MD   10/28/2022

## 2022-10-28 NOTE — PROGRESS NOTES
Dr Camilla Manuel notified of patients BP and pulse he instructed me to give Hydralazine 5 mg , will continue to monitor.

## 2022-11-04 DIAGNOSIS — Z00.00 ROUTINE GENERAL MEDICAL EXAMINATION AT A HEALTH CARE FACILITY: ICD-10-CM

## 2022-11-04 LAB
A/G RATIO: 1.9 (ref 1.1–2.2)
ALBUMIN SERPL-MCNC: 4.3 G/DL (ref 3.4–5)
ALP BLD-CCNC: 97 U/L (ref 40–129)
ALT SERPL-CCNC: 7 U/L (ref 10–40)
ANION GAP SERPL CALCULATED.3IONS-SCNC: 12 MMOL/L (ref 3–16)
AST SERPL-CCNC: 18 U/L (ref 15–37)
BASOPHILS ABSOLUTE: 0.1 K/UL (ref 0–0.2)
BASOPHILS RELATIVE PERCENT: 0.8 %
BILIRUB SERPL-MCNC: 0.3 MG/DL (ref 0–1)
BUN BLDV-MCNC: 15 MG/DL (ref 7–20)
CALCIUM SERPL-MCNC: 9.5 MG/DL (ref 8.3–10.6)
CHLORIDE BLD-SCNC: 100 MMOL/L (ref 99–110)
CHOLESTEROL, TOTAL: 186 MG/DL (ref 0–199)
CO2: 27 MMOL/L (ref 21–32)
CREAT SERPL-MCNC: 1 MG/DL (ref 0.8–1.3)
EOSINOPHILS ABSOLUTE: 0.2 K/UL (ref 0–0.6)
EOSINOPHILS RELATIVE PERCENT: 2.4 %
GFR SERPL CREATININE-BSD FRML MDRD: >60 ML/MIN/{1.73_M2}
GLUCOSE BLD-MCNC: 86 MG/DL (ref 70–99)
HCT VFR BLD CALC: 44.8 % (ref 40.5–52.5)
HDLC SERPL-MCNC: 49 MG/DL (ref 40–60)
HEMOGLOBIN: 15 G/DL (ref 13.5–17.5)
LDL CHOLESTEROL CALCULATED: 115 MG/DL
LYMPHOCYTES ABSOLUTE: 1.5 K/UL (ref 1–5.1)
LYMPHOCYTES RELATIVE PERCENT: 20.4 %
MCH RBC QN AUTO: 30.5 PG (ref 26–34)
MCHC RBC AUTO-ENTMCNC: 33.5 G/DL (ref 31–36)
MCV RBC AUTO: 91.1 FL (ref 80–100)
MONOCYTES ABSOLUTE: 0.6 K/UL (ref 0–1.3)
MONOCYTES RELATIVE PERCENT: 7.6 %
NEUTROPHILS ABSOLUTE: 5.1 K/UL (ref 1.7–7.7)
NEUTROPHILS RELATIVE PERCENT: 68.8 %
PDW BLD-RTO: 12.6 % (ref 12.4–15.4)
PLATELET # BLD: 354 K/UL (ref 135–450)
PMV BLD AUTO: 7.2 FL (ref 5–10.5)
POTASSIUM SERPL-SCNC: 4.3 MMOL/L (ref 3.5–5.1)
RBC # BLD: 4.92 M/UL (ref 4.2–5.9)
SODIUM BLD-SCNC: 139 MMOL/L (ref 136–145)
TOTAL PROTEIN: 6.6 G/DL (ref 6.4–8.2)
TRIGL SERPL-MCNC: 108 MG/DL (ref 0–150)
TSH REFLEX: 1.56 UIU/ML (ref 0.27–4.2)
VLDLC SERPL CALC-MCNC: 22 MG/DL
WBC # BLD: 7.4 K/UL (ref 4–11)

## 2022-11-07 ENCOUNTER — OFFICE VISIT (OUTPATIENT)
Dept: FAMILY MEDICINE CLINIC | Age: 67
End: 2022-11-07
Payer: COMMERCIAL

## 2022-11-07 VITALS
DIASTOLIC BLOOD PRESSURE: 84 MMHG | BODY MASS INDEX: 28.99 KG/M2 | RESPIRATION RATE: 16 BRPM | HEIGHT: 72 IN | HEART RATE: 70 BPM | OXYGEN SATURATION: 98 % | SYSTOLIC BLOOD PRESSURE: 128 MMHG | TEMPERATURE: 97.1 F | WEIGHT: 214 LBS

## 2022-11-07 DIAGNOSIS — Z00.00 ROUTINE GENERAL MEDICAL EXAMINATION AT A HEALTH CARE FACILITY: Primary | ICD-10-CM

## 2022-11-07 DIAGNOSIS — Z23 NEEDS FLU SHOT: ICD-10-CM

## 2022-11-07 PROCEDURE — 90694 VACC AIIV4 NO PRSRV 0.5ML IM: CPT | Performed by: FAMILY MEDICINE

## 2022-11-07 PROCEDURE — 99367 TEAM CONF W/O PAT BY PHYS: CPT | Performed by: FAMILY MEDICINE

## 2022-11-07 PROCEDURE — 90471 IMMUNIZATION ADMIN: CPT | Performed by: FAMILY MEDICINE

## 2022-11-07 SDOH — ECONOMIC STABILITY: INCOME INSECURITY: IN THE LAST 12 MONTHS, WAS THERE A TIME WHEN YOU WERE NOT ABLE TO PAY THE MORTGAGE OR RENT ON TIME?: NO

## 2022-11-07 SDOH — ECONOMIC STABILITY: FOOD INSECURITY: WITHIN THE PAST 12 MONTHS, YOU WORRIED THAT YOUR FOOD WOULD RUN OUT BEFORE YOU GOT MONEY TO BUY MORE.: NEVER TRUE

## 2022-11-07 SDOH — ECONOMIC STABILITY: FOOD INSECURITY: WITHIN THE PAST 12 MONTHS, THE FOOD YOU BOUGHT JUST DIDN'T LAST AND YOU DIDN'T HAVE MONEY TO GET MORE.: NEVER TRUE

## 2022-11-07 ASSESSMENT — PATIENT HEALTH QUESTIONNAIRE - PHQ9
SUM OF ALL RESPONSES TO PHQ QUESTIONS 1-9: 0
SUM OF ALL RESPONSES TO PHQ QUESTIONS 1-9: 0
2. FEELING DOWN, DEPRESSED OR HOPELESS: 0
SUM OF ALL RESPONSES TO PHQ QUESTIONS 1-9: 0
SUM OF ALL RESPONSES TO PHQ QUESTIONS 1-9: 0
1. LITTLE INTEREST OR PLEASURE IN DOING THINGS: 0
SUM OF ALL RESPONSES TO PHQ9 QUESTIONS 1 & 2: 0

## 2022-11-07 ASSESSMENT — ENCOUNTER SYMPTOMS
SORE THROAT: 0
BLOOD IN STOOL: 0
TROUBLE SWALLOWING: 0
RESPIRATORY NEGATIVE: 1
CONSTIPATION: 0

## 2022-11-07 ASSESSMENT — SOCIAL DETERMINANTS OF HEALTH (SDOH): HOW HARD IS IT FOR YOU TO PAY FOR THE VERY BASICS LIKE FOOD, HOUSING, MEDICAL CARE, AND HEATING?: NOT HARD AT ALL

## 2022-11-07 NOTE — PATIENT INSTRUCTIONS
Continue the same medications      Consider Covid booster- new vaccine / updated    Exercise,  walk, eat sensibly,

## 2022-11-07 NOTE — PROGRESS NOTES
Subjective:      Patient ID: Lisandro Salvador is a 77 y.o. y.o. male. Here for annual exam and follow up  Meds and hx reviewed. Had his right wrist and thumb operated-  doing well.           HPI      Chief Complaint   Patient presents with    Annual Exam     Blood work 11/4/22       Allergies   Allergen Reactions    Naprosyn [Naproxen] Rash       Past Medical History:   Diagnosis Date    Allergic rhinitis     Arthritis     Osteoarthritis        Past Surgical History:   Procedure Laterality Date    ARTHROPLASTY Right 10/28/2022    RIGHT WRIST PROXIMAL ROW CARPECTOMY WITH PIN NEURECTOMY, RELEASE RIGHT TRIGGER THUMB, PROXIMAL STYLOIDECTOMY performed by Breanna Barraza MD at [address] [facility]      rt wrist  age 27    [address] Right 03/14/2017    CARPAL TUNNEL RELEASE Left 04/11/2017    with excision volar wrist ganglion    COLONOSCOPY  2008    no polyps    COLONOSCOPY  02/26/2018    diverticulosis    HAND SURGERY Left 12/12/2017    Proximal row carpectomy    HERNIA REPAIR Bilateral 08/23/2018     ROBOTIC [address] REPLACEMENT  2008    rt total hip    KNEE ARTHROSCOPY Left 8/20/2014    LEFT KNEE ARTHROSCOPY, CHONDROPLASTY, SYNOVECTOMY, PARTIAL    HI REPAIR ING HERNIA,5+Y/O,REDUCIBL Bilateral 8/23/2018    ROBOTIC INGUINAL HERNIA REPAIR WITH MESH performed by Karolina Banegas MD at [facility] ARTHROSCOPY Left 10/28/14    debridement,rotator cuff repair    TONSILLECTOMY AND ADENOIDECTOMY Bilateral 1960    TOTAL KNEE ARTHROPLASTY Left 3/28/2022    LEFT TOTAL KNEE ARTHROPLASTY - COMPLEX WITH ADDUCTOR CANAL BLOCK                         MEDACTA performed by Herman Wynn MD at [place] [address] History     Socioeconomic History    Marital status: Single     Spouse name: Not on file    Number of children: Not on file    Years of education: Not on file    Highest education level: Not on file   Occupational History    Not on file   Tobacco Use    Smoking status: Never    Smokeless tobacco: Never   Vaping Use    Vaping Use: Never used   Substance and Sexual Activity    Alcohol use: Yes     Alcohol/week: 0.0 standard drinks     Comment: 1-3 beers a week. Drug use: No    Sexual activity: Yes   Other Topics Concern    Not on file   Social History Narrative    Not on file     Social Determinants of Health     Financial Resource Strain: Low Risk     Difficulty of Paying Living Expenses: Not hard at all   Food Insecurity: No Food Insecurity    Worried About Running Out of Food in the Last Year: Never true    [address] in the Last Year: Never true   Transportation Needs: No Transportation Needs    Lack of Transportation (Medical): No    Lack of Transportation (Non-Medical): No   Physical Activity: Not on file   Stress: Not on file   Social Connections: Not on file   Intimate Partner Violence: Not on file   Housing Stability: Unknown    Unable to Pay for Housing in the Last Year: No    Number of Places Lived in the Last Year: Not on file    Unstable Housing in the Last Year: No       Family History   Problem Relation Age of Onset    Cancer Mother         skin cancer    High Blood Pressure Mother     Cancer Father         bladder    High Blood Pressure Father     No Known Problems Maternal Grandmother     No Known Problems Maternal Grandfather     Cancer Paternal Grandmother         leukemia    No Known Problems Paternal Grandfather     Diabetes Neg Hx     Heart Disease Neg Hx        Vitals:    11/07/22 0739   BP: 128/84   Pulse: 70   Resp: 16   Temp: 97.1 °F (36.2 °C)   SpO2: 98%       Wt Readings from Last 3 Encounters:   11/07/22 214 lb (97.1 kg)   10/28/22 210 lb (95.3 kg)   10/07/22 215 lb (97.5 kg)       Review of Systems   Constitutional:  Negative for activity change and unexpected weight change. HENT:  Negative for sore throat and trouble swallowing. Eyes:  Negative for visual disturbance. Respiratory: Negative. Cardiovascular: Negative. Gastrointestinal:  Negative for blood in stool and constipation. Reflux-  managed by diet and acid blocker. Without his pepcid can be bad    Colonoscopy -   2018-  no polyps,     Genitourinary:  Negative for hematuria and urgency. Musculoskeletal:         Occ toe pain,  sees Dr Don Ho- podiatry. Has inserts    Functional-  exercise discussed   Neurological:  Negative for seizures, facial asymmetry, speech difficulty and numbness. Hematological:  Does not bruise/bleed easily. Psychiatric/Behavioral:  Negative for dysphoric mood, self-injury and suicidal ideas. The patient is not nervous/anxious. Objective:   Physical Exam  Constitutional:       Appearance: He is not ill-appearing. HENT:      Right Ear: Tympanic membrane normal.      Left Ear: Tympanic membrane normal.      Mouth/Throat:      Mouth: Mucous membranes are moist.      Pharynx: Oropharynx is clear. Eyes:      General: No scleral icterus. Extraocular Movements: Extraocular movements intact. Pupils: Pupils are equal, round, and reactive to light. Neck:      Vascular: No carotid bruit. Cardiovascular:      Rate and Rhythm: Normal rate and regular rhythm. Pulses: Normal pulses. Pulmonary:      Effort: Pulmonary effort is normal. No respiratory distress. Breath sounds: Normal breath sounds. Abdominal:      General: Abdomen is flat. There is no distension. Palpations: Abdomen is soft. There is no mass. Tenderness: There is no abdominal tenderness. Musculoskeletal:      Right lower leg: No edema. Left lower leg: No edema. Comments: Splint right wrist   Lymphadenopathy:      Cervical: No cervical adenopathy. Skin:     General: Skin is warm and dry. Neurological:      General: No focal deficit present. Mental Status: He is alert and oriented to person, place, and time.    Psychiatric:         Mood and Affect: Mood normal.         Behavior: Behavior normal.         Thought Content: Thought content normal.         Judgment: Judgment normal.       Assessment:       Diagnosis Orders   1. Needs flu shot  Influenza, FLUAD, (age 72 y+), IM, PF, 0.5 mL      Annual  GERD / hyperacidity          Plan:     Labs reviewed. HDL discussed- diet measures,  declines statin meds    Immunizations reviewed-  UTD    Covid new booster recommended. Continue the current program of medicines and supplements. Walk for exercise or exercises tolerated with his orthopedic conditions. Routine follow-up 1 year    Current Outpatient Medications   Medication Sig Dispense Refill    Multiple Vitamins-Minerals (PRESERVISION AREDS PO) Take by mouth      Omega 3-6-9 Fatty Acids (OMEGA 3-6-9 PO) Take by mouth      Misc Natural Products (TART CHERRY ADVANCED) CAPS Take 1 capsule by mouth daily       famotidine (PEPCID) 20 MG tablet Take 1 tablet by mouth 2 times daily 60 tablet 3    calcium carbonate (OSCAL) 500 MG TABS tablet Take 500 mg by mouth 3 times daily       Glucosamine-Chondroitin (GLUCOSAMINE CHONDR COMPLEX PO) Take 1 tablet by mouth daily        No current facility-administered medications for this visit.

## 2022-11-28 ENCOUNTER — TELEPHONE (OUTPATIENT)
Dept: ORTHOPEDIC SURGERY | Age: 67
End: 2022-11-28

## 2022-11-29 ENCOUNTER — OFFICE VISIT (OUTPATIENT)
Dept: ORTHOPEDIC SURGERY | Age: 67
End: 2022-11-29

## 2022-11-29 VITALS — HEIGHT: 72 IN | BODY MASS INDEX: 28.99 KG/M2 | WEIGHT: 214 LBS

## 2022-11-29 DIAGNOSIS — Z96.641 HISTORY OF TOTAL RIGHT HIP REPLACEMENT: Primary | ICD-10-CM

## 2022-11-29 NOTE — PROGRESS NOTES
Dr Castro [place]      Date /Time 11/29/2022       9:41 AM EST  Name Alpa Harris             1955   Location  [facility]  MRN [id number]                Chief Complaint   Patient presents with    Hip Pain     Right RUBY 2008 Dr Genet Amador        History of Present Illness    Alpa Harris is a 79 y.o. male who presents with  right hip pain. Sent in consultation by Zayda Pulido DO, . Injury Mechanism:  none. Worker's Comp. & legal issues:   none. Previous Treatments: Ice, Heat, and NSAIDs    Patient presents the office today for new problem. Patient here with a chief complaint of right hip pain. Patient did have a metal-on-metal total hip replacement performed by Dr. Genet Amador in 2008. The hip did well for a long time. Then after he had his knee replaced he was doing more activities and developed some soreness. The soreness is now resolved. It was global in nature. Increased pain with activities and improvement with rest.  No direct injury or trauma.     Past History  Past Medical History:   Diagnosis Date    Allergic rhinitis     Arthritis     Osteoarthritis      Past Surgical History:   Procedure Laterality Date    ARTHROPLASTY Right 10/28/2022    RIGHT WRIST PROXIMAL ROW CARPECTOMY WITH PIN NEURECTOMY, RELEASE RIGHT TRIGGER THUMB, PROXIMAL STYLOIDECTOMY performed by Priscilla Strange MD at [address] [place] [address]      rt wrist  age 27    CARPAL TUNNEL RELEASE Right 03/14/2017    CARPAL TUNNEL RELEASE Left 04/11/2017    with excision volar wrist ganglion    COLONOSCOPY  2008    no polyps    COLONOSCOPY  02/26/2018    diverticulosis    HAND SURGERY Left 12/12/2017    Proximal row carpectomy    HERNIA REPAIR Bilateral 08/23/2018     ROBOTIC [address] REPLACEMENT  2008    rt total hip    KNEE ARTHROSCOPY Left 8/20/2014    LEFT KNEE ARTHROSCOPY, CHONDROPLASTY, SYNOVECTOMY, PARTIAL    OK REPAIR ING HERNIA,5+Y/O,REDUCIBL Bilateral 8/23/2018    ROBOTIC INGUINAL HERNIA REPAIR WITH MESH performed by Megha Medina MD at [address] [facility] ARTHROSCOPY Left 10/28/14    debridement,rotator cuff repair    TONSILLECTOMY AND ADENOIDECTOMY Bilateral 1960    TOTAL KNEE ARTHROPLASTY Left 3/28/2022    LEFT TOTAL KNEE ARTHROPLASTY - COMPLEX WITH ADDUCTOR CANAL BLOCK                         MEDACTA performed by Marielos Thibodeaux MD at [facility] History   Problem Relation Age of Onset    Cancer Mother         skin cancer    High Blood Pressure Mother     Cancer Father         bladder    High Blood Pressure Father     No Known Problems Maternal Grandmother     No Known Problems Maternal Grandfather     Cancer Paternal Grandmother         leukemia    No Known Problems Paternal Grandfather     Diabetes Neg Hx     Heart Disease Neg Hx      Social History     Tobacco Use    Smoking status: Never    Smokeless tobacco: Never   Substance Use Topics    Alcohol use: Yes     Alcohol/week: 0.0 standard drinks     Comment: 1-3 beers a week. Current Outpatient Medications on File Prior to Visit   Medication Sig Dispense Refill    Multiple Vitamins-Minerals (PRESERVISION AREDS PO) Take by mouth      Omega 3-6-9 Fatty Acids (OMEGA 3-6-9 PO) Take by mouth      Misc Natural Products (TART CHERRY ADVANCED) CAPS Take 1 capsule by mouth daily       famotidine (PEPCID) 20 MG tablet Take 1 tablet by mouth 2 times daily 60 tablet 3    calcium carbonate (OSCAL) 500 MG TABS tablet Take 500 mg by mouth 3 times daily       Glucosamine-Chondroitin (GLUCOSAMINE CHONDR COMPLEX PO) Take 1 tablet by mouth daily        No current facility-administered medications on file prior to visit.         ASCVD 10-YEAR RISK SCORE  The 10-year ASCVD risk score (Kathrin FERNANDEZ, et al., 2019) is: 14.2%    Values used to calculate the score:      Age: 79 years      Sex: Male      Is Non- : No      Diabetic: No      Tobacco smoker: No      Systolic Blood Pressure: 873 mmHg      Is BP treated: No      HDL Cholesterol: 49 mg/dL      Total Cholesterol: 186 mg/dL   . Review of Systems  10-point ROS is negative other than HPI. Physical Exam  Based off 1997 Exam Criteria  Ht 6' (1.829 m)   Wt 214 lb (97.1 kg)   BMI 29.02 kg/m²      Constitutional:       General: He is not in acute distress. Appearance: Normal appearance. Cardiovascular:      Rate and Rhythm: Normal rate and regular rhythm. Pulses: Normal pulses. Pulmonary:      Effort: Pulmonary effort is normal. No respiratory distress. Neurological:      Mental Status: He is alert and oriented to person, place, and time. Mental status is at baseline. Musculoskeletal:  Gait:  normal    Skin: Clean and intact.   No open sores or wounds    Lymphatics: No palpable lymph nodes    Spine / Hip Exam:      RIGHT  LEFT    Lumbar Spine Exam  [x] All Neg    [x] All Neg     Straight leg raise  []  []Not tested   []  []Not tested    Clonus  []  []Not tested   []  []Not tested    Pain with motion  []  []Not tested   []  []Not tested    Radiculopathy  []  []Not tested   []  []Not tested    Paraspinal muscle tenderness  [] Paraspinal  []Midline   [] Paraspinal  []Midline   Sensation RIGHT  LEFT    L3  [x] Normal []Decreased    [x] Normal []Decreased   L4  [x] Normal  []Decreased   [x] Normal []Decreased   L5  [x] Normal []Decreased   [x] Normal []Decreased   S1  [x] Normal  []Decreased   [x] Normal []Decreased   Pelvis       Scoliosis  [x] Nml  [] Present     Leg-length discrepency  [x] Equal  [] Right longer   [] Left longer   Range of Motion Active Passive Active Passive   Hip Flexion 100  120    Abduction 30  50    External Rotation @ 90 flex 35  65    Internal Rotation @ 90 flex -5  20           Hip Impingement / Dysplasia  [x] All Neg  [] Not tested   [x] All Neg  [] Not tested    Hip impingement test  []  []Not tested   []  []Not tested    C-sign  []  []Not tested   []  []Not tested    Anterior instability apprehension  []  []Not tested   [] []Not tested    Posterior instability apprehension  []  []Not tested   []  []Not tested    Uncontained Internal rotation  []  []Not tested  []  []Not tested          Abductors  [x] All Neg  [] Not tested   [x] All Neg  [] Not tested    Medius strength  []  []Not tested   []  []Not tested    Minimum strength  []  []Not tested   []  []Not tested    IT band tendonitis  []  []Not tested   []  []Not tested    Trochanteric tenderness  []  []Not tested  []  []Not tested   Sciatic neuropathic pain  []  []Not tested   []  []Not tested           Post-arthroplasty  [] All Neg  [] Not tested   [] All Neg  [] Not tested    Rectus tendonitis  []  []Not tested   []  []Not tested    Iliopsoas tendonitis       Start-up pain  []  []Not tested   []  []Not tested          Imaging    Right Hip: [address] [facility]  Radiographs: X-rays were ordered today reviewed of the right hip.  3 views. AP pelvis, lateral, and false profile. No evidence of fractures or dislocations. Total hip arthroplasty in place. Metal-on-metal type of prosthesis. Cystic structures present acetabulum and H0 present. Procedure:  Orders Placed This Encounter   Procedures    XR HIP RIGHT (2-3 VIEWS)     Standing Status:   Future     Number of Occurrences:   1     Standing Expiration Date:   11/25/2023       Assessment and Plan  Iris Charles was seen today for hip pain. Diagnoses and all orders for this visit:    History of total right hip replacement  -     XR HIP RIGHT (2-3 VIEWS); Future        Patient does have a metal-on-metal total hip arthroplasty but has no symptoms today. We have discussed performing metal-on-metal levels and also MRI with metal suppression to evaluate for pseudotumor but patient really does not have any functional symptoms at this time. We will simply monitor his hip.     I discussed with Radhika Flores that his history, symptoms, signs, and imaging are most consistent with  history of metal-on-metal total    We reviewed the natural history of these conditions and treatment options ranging from conservative measures (rest, icing, activity modification, physical therapy, pain meds, cortisone injection) to surgical options. In terms of treatment, I recommended continuing with rest, icing, avoidance of painful activities, NSAIDs or pain meds as tolerated, and physical therapy. We discussed surgical options as well, should conservative measures fail. Electronically signed by Bernice Allan MD on 11/29/2022 at 9:41 AM  This dictation was generated by voice recognition computer software. Although all attempts are made to edit the dictation for accuracy, there may be errors in the transcription that are not intended.

## 2023-02-22 DIAGNOSIS — Z96.652 S/P TKR (TOTAL KNEE REPLACEMENT), LEFT: Primary | ICD-10-CM

## 2023-02-22 RX ORDER — AMOXICILLIN 500 MG/1
CAPSULE ORAL
Qty: 8 CAPSULE | Refills: 1 | OUTPATIENT
Start: 2023-02-22

## 2023-02-22 RX ORDER — AMOXICILLIN AND CLAVULANATE POTASSIUM 875; 125 MG/1; MG/1
TABLET, FILM COATED ORAL
Qty: 6 TABLET | Refills: 0 | Status: SHIPPED | OUTPATIENT
Start: 2023-02-22

## 2023-03-06 DIAGNOSIS — Z96.652 S/P TKR (TOTAL KNEE REPLACEMENT), LEFT: Primary | ICD-10-CM

## 2023-03-06 RX ORDER — AMOXICILLIN AND CLAVULANATE POTASSIUM 875; 125 MG/1; MG/1
TABLET, FILM COATED ORAL
Qty: 6 TABLET | Refills: 3 | Status: SHIPPED | OUTPATIENT
Start: 2023-03-06

## 2023-03-09 ENCOUNTER — OFFICE VISIT (OUTPATIENT)
Dept: FAMILY MEDICINE CLINIC | Age: 68
End: 2023-03-09

## 2023-03-09 VITALS
TEMPERATURE: 97.2 F | RESPIRATION RATE: 16 BRPM | SYSTOLIC BLOOD PRESSURE: 159 MMHG | BODY MASS INDEX: 29.57 KG/M2 | DIASTOLIC BLOOD PRESSURE: 100 MMHG | OXYGEN SATURATION: 98 % | WEIGHT: 218 LBS | HEART RATE: 60 BPM

## 2023-03-09 DIAGNOSIS — M21.372 LEFT FOOT DROP: Primary | ICD-10-CM

## 2023-03-09 SDOH — ECONOMIC STABILITY: INCOME INSECURITY: HOW HARD IS IT FOR YOU TO PAY FOR THE VERY BASICS LIKE FOOD, HOUSING, MEDICAL CARE, AND HEATING?: NOT HARD AT ALL

## 2023-03-09 SDOH — ECONOMIC STABILITY: FOOD INSECURITY: WITHIN THE PAST 12 MONTHS, YOU WORRIED THAT YOUR FOOD WOULD RUN OUT BEFORE YOU GOT MONEY TO BUY MORE.: NEVER TRUE

## 2023-03-09 SDOH — ECONOMIC STABILITY: FOOD INSECURITY: WITHIN THE PAST 12 MONTHS, THE FOOD YOU BOUGHT JUST DIDN'T LAST AND YOU DIDN'T HAVE MONEY TO GET MORE.: NEVER TRUE

## 2023-03-09 ASSESSMENT — PATIENT HEALTH QUESTIONNAIRE - PHQ9
SUM OF ALL RESPONSES TO PHQ QUESTIONS 1-9: 0
SUM OF ALL RESPONSES TO PHQ QUESTIONS 1-9: 0
2. FEELING DOWN, DEPRESSED OR HOPELESS: 0
SUM OF ALL RESPONSES TO PHQ9 QUESTIONS 1 & 2: 0
SUM OF ALL RESPONSES TO PHQ QUESTIONS 1-9: 0
1. LITTLE INTEREST OR PLEASURE IN DOING THINGS: 0
SUM OF ALL RESPONSES TO PHQ QUESTIONS 1-9: 0

## 2023-03-09 NOTE — PATIENT INSTRUCTIONS
Check BP 2 times a week,  If nor less than 140 on the top and 85 on the bottom,  call me    Call the neurologist and get an appointment and set up the EMG- nerve test.

## 2023-03-09 NOTE — LETTER
March 9, 2023       Guido Loza YOB: 1955   [address] [facility] [address] Date of Visit:  3/9/2023       To Whom It May Concern:     It is my medical opinion that Nano Sheppard requires a disability parking placard for the following reasons:  OA knees  Foot drop  Duration of need: Five years        Sincerely,        Emily Velazquez, DO

## 2023-03-09 NOTE — PROGRESS NOTES
Subjective:      Patient ID: Guido Loza is a 79 y.o. y.o. male. Numb feeling lower left leg and can not dorsi-flex left foot. 3 months. Foot dropped developed gradually  Feels numb in the shin. No radicular pain from back to foot.       HPI      Chief Complaint   Patient presents with    Numbness     L foot - numbness & tingling x 2 months - not able to raise & lower foot properly since December - Podiatrist thinks might be related to nerve in his back       Allergies   Allergen Reactions    Naprosyn [Naproxen] Rash       Past Medical History:   Diagnosis Date    Allergic rhinitis     Arthritis     Osteoarthritis        Past Surgical History:   Procedure Laterality Date    ARTHROPLASTY Right 10/28/2022    RIGHT WRIST PROXIMAL ROW CARPECTOMY WITH PIN NEURECTOMY, RELEASE RIGHT TRIGGER THUMB, PROXIMAL STYLOIDECTOMY performed by Ben Gee MD at [facility]      rt wrist  age 27    [address] Right 03/14/2017    CARPAL TUNNEL RELEASE Left 04/11/2017    with excision volar wrist ganglion    COLONOSCOPY  2008    no polyps    COLONOSCOPY  02/26/2018    diverticulosis    FRACTURE SURGERY  1977    bone graft, right wrist    HAND SURGERY Left 12/12/2017    Proximal row carpectomy    HERNIA REPAIR Bilateral 08/23/2018     [address]OBOTIC [address] [facility] [facility] [address] REPLACEMENT  2008    rt total hip    KNEE ARTHROPLASTY  2022    Left    KNEE ARTHROSCOPY Left 08/20/2014    LEFT KNEE ARTHROSCOPY, CHONDROPLASTY, SYNOVECTOMY, PARTIAL    [facility] 1ST INGUN HRNA AGE 5 YRS/> REDUCIBLE Bilateral 08/23/2018    ROBOTIC INGUINAL HERNIA REPAIR WITH MESH performed by Sebas French MD at [address] [facility] ARTHROSCOPY Left 10/28/2014    debridement,rotator cuff repair    TONSILLECTOMY AND ADENOIDECTOMY Bilateral 1960    TOTAL HIP ARTHROPLASTY  2008    Right    TOTAL KNEE ARTHROPLASTY Left 03/28/2022    LEFT TOTAL KNEE ARTHROPLASTY - COMPLEX WITH ADDUCTOR CANAL BLOCK [place] performed by Feli Thomas MD at [facility] History     Socioeconomic History    Marital status: Single     Spouse name: Not on file    Number of children: Not on file    Years of education: Not on file    Highest education level: Not on file   Occupational History    Not on file   Tobacco Use    Smoking status: Never    Smokeless tobacco: Never   Vaping Use    Vaping Use: Never used   Substance and Sexual Activity    Alcohol use: Yes     Alcohol/week: 3.0 standard drinks     Types: 3 Cans of beer per week     Comment: 1-3 beers a week. Drug use: No    Sexual activity: Not Currently     Partners: Female   Other Topics Concern    Not on file   Social History Narrative    Not on file     Social Determinants of Health     Financial Resource Strain: Low Risk     Difficulty of Paying Living Expenses: Not hard at all   Food Insecurity: No Food Insecurity    Worried About Running Out of Food in the Last Year: Never true    [address] in the Last Year: Never true   Transportation Needs: No Transportation Needs    Lack of Transportation (Medical): No    Lack of Transportation (Non-Medical):  No   Physical Activity: Not on file   Stress: Not on file   Social Connections: Not on file   Intimate Partner Violence: Not on file   Housing Stability: Unknown    Unable to Pay for Housing in the Last Year: No    Number of Places Lived in the Last Year: Not on file    Unstable Housing in the Last Year: No       Family History   Problem Relation Age of Onset    Cancer Mother         skin cancer    High Blood Pressure Mother     Cancer Father         bladder    High Blood Pressure Father     No Known Problems Maternal Grandmother     No Known Problems Maternal Grandfather     Cancer Paternal Grandmother         leukemia    No Known Problems Paternal Grandfather     Diabetes Neg Hx     Heart Disease Neg Hx        Vitals:    03/09/23 1019   BP: (!) 181/97   Pulse:    Resp:    Temp:    SpO2:        Wt Readings from Last 3 Encounters:   03/09/23 218 lb (98.9 kg)   11/29/22 214 lb (97.1 kg)   11/07/22 214 lb (97.1 kg)       Review of Systems   Constitutional:  Negative for chills, fever and unexpected weight change. Neurological:  Positive for numbness. Left big toe feels numbish    No color change           Objective:   Physical Exam  Constitutional:       Appearance: He is not ill-appearing. Musculoskeletal:      Right lower leg: No edema. Left lower leg: No edema. Comments: Sciatic signs neg    Weakness left great toe. Dorsi flexion impaired, weak,    Pulse ok   Color ok   Neurological:      Mental Status: He is alert and oriented to person, place, and time. Assessment:      Dysesthesias, left foot  Early foot drop      Plan:     Need EMG and neuro eval    BP . Majo Hadley see if can check at home-  2 days a week. Monitor 140 / 85. We will follow-up after the EMG result. Current Outpatient Medications   Medication Sig Dispense Refill    Multiple Vitamins-Minerals (PRESERVISION AREDS PO) Take by mouth      Omega 3-6-9 Fatty Acids (OMEGA 3-6-9 PO) Take by mouth      Misc Natural Products (TART CHERRY ADVANCED) CAPS Take 1 capsule by mouth daily       famotidine (PEPCID) 20 MG tablet Take 1 tablet by mouth 2 times daily 60 tablet 3    calcium carbonate (OSCAL) 500 MG TABS tablet Take 500 mg by mouth 3 times daily       Glucosamine-Chondroitin (GLUCOSAMINE CHONDR COMPLEX PO) Take 1 tablet by mouth daily       amoxicillin-clavulanate (AUGMENTIN) 875-125 MG per tablet Take 1 tablet by mouth twice a day starting the day before procedure, continuing the day of the procedure, and finishing the day after the procedure (Patient not taking: Reported on 3/9/2023) 6 tablet 3     No current facility-administered medications for this visit.

## 2023-03-28 ENCOUNTER — OFFICE VISIT (OUTPATIENT)
Dept: ORTHOPEDIC SURGERY | Age: 68
End: 2023-03-28
Payer: COMMERCIAL

## 2023-03-28 VITALS — WEIGHT: 218 LBS | BODY MASS INDEX: 29.53 KG/M2 | HEIGHT: 72 IN

## 2023-03-28 DIAGNOSIS — Z96.652 S/P TKR (TOTAL KNEE REPLACEMENT), LEFT: Primary | ICD-10-CM

## 2023-03-28 PROCEDURE — 99214 OFFICE O/P EST MOD 30 MIN: CPT | Performed by: PHYSICIAN ASSISTANT

## 2023-03-28 PROCEDURE — 1123F ACP DISCUSS/DSCN MKR DOCD: CPT | Performed by: PHYSICIAN ASSISTANT

## 2023-03-28 NOTE — PROGRESS NOTES
Dr Bina Parra      Date /Time 3/28/2023       8:32 AM EDT  Name Gloria Gaines             1955   Location  [facility]  MRN [id number]                Chief Complaint   Patient presents with    Follow-up     Left TKA 03.28.2022        History of Present Illness      Gloria Gaines is a 79 y.o. male is here for post-op visit after LEFT  [id number] Total Knee Arthroplasty    Patient presents to the office today for a follow-up visit. Patient is 1 year status post left total knee arthroplasty. Patient's knee is doing well. He has returned to normal function involving his knee. His pain is controlled. He does state that he has developed a foot drop left lower extremity which began approximately 9-10 months after surgery. He does not have unknown origin for the problem. Physical Exam    Based off 1997 Exam Criteria    Ht 6' (1.829 m)   Wt 218 lb (98.9 kg)   BMI 29.57 kg/m²      Constitutional:       General: He is not in acute distress. Appearance: Normal appearance. LEFT Knee: incision clean, intact, healing appropriately. No surrounding  erythema or fluctuance. Patient has 2/5 strength with dorsiflexion against resistance. No evidence of DVT. Range of motion 0-120    Imaging       Left Knee: [address] [facility]  Radiographs: X-rays were ordered today and reviewed of the left knee. 3 views. AP, lateral, and skyline views. They demonstrate a left total knee arthroplasty in good. No evidence of loosening or periprosthetic fracture. Assessment and Plan    Rose Dela Cruz was seen today for follow-up. Diagnoses and all orders for this visit:    S/P TKR (total knee replacement), left  -     XR KNEE LEFT (3 VIEWS); Future        Patient is doing well in terms of his left total knee. He does have a foot drop. We will order him an EMG of the left lower extremity in an effort to help delineate the etiology to his problem. He will follow-up in the office after EMG to review results.

## 2023-03-28 NOTE — LETTER
March 28, 2023       Caryl Howard YOB: 1955   [address] [place] [id number] Date of Visit:  3/28/2023       DX: s/p Left Total Knee Arthroplasty/ Left Foot Drop   Z96.652/M21.372      RX: EMG Left Lower Extremity      Please fax Results to [phone]          MD Azul Townsend MD

## 2023-04-19 ENCOUNTER — TELEPHONE (OUTPATIENT)
Dept: FAMILY MEDICINE CLINIC | Age: 68
End: 2023-04-19

## 2023-04-19 DIAGNOSIS — M21.372 LEFT FOOT DROP: Primary | ICD-10-CM

## 2023-04-19 NOTE — TELEPHONE ENCOUNTER
Abdias Loya with [facility] called to verify if they were doing EMG on just the left or should it be bilat? Please advise. Place new order in Epic if bilat. Call Abdias Loya with any questions.

## 2023-04-19 NOTE — TELEPHONE ENCOUNTER
Called & spoke to Annette with [facility]. She states she spoke to [facility] he already had the EMG. She says thank you though.

## 2023-04-30 DIAGNOSIS — G62.9 NEUROPATHY: ICD-10-CM

## 2023-04-30 DIAGNOSIS — M21.372 LEFT FOOT DROP: Primary | ICD-10-CM

## 2023-05-15 ENCOUNTER — PATIENT MESSAGE (OUTPATIENT)
Dept: FAMILY MEDICINE CLINIC | Age: 68
End: 2023-05-15

## 2023-05-23 ENCOUNTER — OFFICE VISIT (OUTPATIENT)
Dept: FAMILY MEDICINE CLINIC | Age: 68
End: 2023-05-23
Payer: COMMERCIAL

## 2023-05-23 VITALS
TEMPERATURE: 97.3 F | OXYGEN SATURATION: 97 % | SYSTOLIC BLOOD PRESSURE: 138 MMHG | RESPIRATION RATE: 16 BRPM | DIASTOLIC BLOOD PRESSURE: 80 MMHG | HEART RATE: 65 BPM | BODY MASS INDEX: 28.75 KG/M2 | WEIGHT: 212 LBS

## 2023-05-23 DIAGNOSIS — G61.82 MULTIFOCAL MOTOR NEUROPATHY (HCC): ICD-10-CM

## 2023-05-23 DIAGNOSIS — M54.17 RADICULITIS, LUMBOSACRAL: ICD-10-CM

## 2023-05-23 DIAGNOSIS — M21.372 LEFT FOOT DROP: Primary | ICD-10-CM

## 2023-05-23 LAB
ERYTHROCYTE [SEDIMENTATION RATE] IN BLOOD BY WESTERGREN METHOD: 16 MM/HR (ref 0–20)
TSH SERPL DL<=0.005 MIU/L-ACNC: 1.97 UIU/ML (ref 0.27–4.2)
VIT B12 SERPL-MCNC: 692 PG/ML (ref 211–911)

## 2023-05-23 PROCEDURE — 1123F ACP DISCUSS/DSCN MKR DOCD: CPT | Performed by: FAMILY MEDICINE

## 2023-05-23 PROCEDURE — 99214 OFFICE O/P EST MOD 30 MIN: CPT | Performed by: FAMILY MEDICINE

## 2023-05-23 RX ORDER — MULTIVITAMIN WITH IRON
30 TABLET ORAL 2 TIMES DAILY
COMMUNITY

## 2023-05-23 RX ORDER — MAG HYDROX/ALUMINUM HYD/SIMETH 400-400-40
SUSPENSION, ORAL (FINAL DOSE FORM) ORAL
COMMUNITY

## 2023-05-23 SDOH — ECONOMIC STABILITY: INCOME INSECURITY: HOW HARD IS IT FOR YOU TO PAY FOR THE VERY BASICS LIKE FOOD, HOUSING, MEDICAL CARE, AND HEATING?: NOT HARD AT ALL

## 2023-05-23 SDOH — ECONOMIC STABILITY: FOOD INSECURITY: WITHIN THE PAST 12 MONTHS, THE FOOD YOU BOUGHT JUST DIDN'T LAST AND YOU DIDN'T HAVE MONEY TO GET MORE.: NEVER TRUE

## 2023-05-23 SDOH — ECONOMIC STABILITY: FOOD INSECURITY: WITHIN THE PAST 12 MONTHS, YOU WORRIED THAT YOUR FOOD WOULD RUN OUT BEFORE YOU GOT MONEY TO BUY MORE.: NEVER TRUE

## 2023-05-23 ASSESSMENT — PATIENT HEALTH QUESTIONNAIRE - PHQ9
SUM OF ALL RESPONSES TO PHQ QUESTIONS 1-9: 0
2. FEELING DOWN, DEPRESSED OR HOPELESS: 0
SUM OF ALL RESPONSES TO PHQ9 QUESTIONS 1 & 2: 0
SUM OF ALL RESPONSES TO PHQ QUESTIONS 1-9: 0
1. LITTLE INTEREST OR PLEASURE IN DOING THINGS: 0

## 2023-05-23 NOTE — PROGRESS NOTES
Subjective:      Patient ID: Francois Rios is a 79 y.o. y.o. male. Following up the EMG and foot drop. EMG discussed. Dr Kendall Ibarra , podiatrist, told him needs to see a back surgeon  Has pressure above the left hip and some lower back sx but not overt radicular complaints. Other  Associated symptoms include arthralgias. Pertinent negatives include no fatigue, fever or headaches.        Chief Complaint   Patient presents with    Other     L foot drop f/u  - saw Dr. Kendall Ibarra - podiatrist thinks has something going on with his back       Allergies   Allergen Reactions    Naprosyn [Naproxen] Rash       Past Medical History:   Diagnosis Date    Allergic rhinitis     Arthritis     Osteoarthritis        Past Surgical History:   Procedure Laterality Date    ARTHROPLASTY Right 10/28/2022    RIGHT WRIST PROXIMAL ROW CARPECTOMY WITH PIN NEURECTOMY, RELEASE RIGHT TRIGGER THUMB, PROXIMAL STYLOIDECTOMY performed by Laya Alegre MD at [facility]      rt wrist  age 27    CARPAL TUNNEL RELEASE Right 03/14/2017    CARPAL TUNNEL RELEASE Left 04/11/2017    with excision volar wrist ganglion    COLONOSCOPY  2008    no polyps    COLONOSCOPY  02/26/2018    diverticulosis    FRACTURE SURGERY  1977    bone graft, right wrist    HAND SURGERY Left 12/12/2017    Proximal row carpectomy    HERNIA REPAIR Bilateral 08/23/2018     ROBOTIC [address] [facility] [address] REPLACEMENT  2008    rt total hip    KNEE ARTHROPLASTY  2022    Left    KNEE ARTHROSCOPY Left 08/20/2014    LEFT KNEE ARTHROSCOPY, CHONDROPLASTY, SYNOVECTOMY, PARTIAL    [place] RPR 1ST INGUN HRNA AGE 5 YRS/> REDUCIBLE Bilateral 08/23/2018    ROBOTIC INGUINAL HERNIA REPAIR WITH MESH performed by Daniel Sharif MD at [address] [facility] ARTHROSCOPY Left 10/28/2014    debridement,rotator cuff repair    TONSILLECTOMY AND ADENOIDECTOMY Bilateral 1960    TOTAL HIP ARTHROPLASTY  2008    Right    TOTAL KNEE ARTHROPLASTY Left 03/28/2022    LEFT

## 2023-05-24 LAB
ALBUMIN SERPL ELPH-MCNC: 3.6 G/DL (ref 3.1–4.9)
ALPHA1 GLOB SERPL ELPH-MCNC: 0.3 G/DL (ref 0.2–0.4)
ALPHA2 GLOB SERPL ELPH-MCNC: 0.8 G/DL (ref 0.4–1.1)
B-GLOBULIN SERPL ELPH-MCNC: 1.2 G/DL (ref 0.9–1.6)
GAMMA GLOB SERPL ELPH-MCNC: 1 G/DL (ref 0.6–1.8)
PROT SERPL-MCNC: 7 G/DL (ref 6.4–8.2)
SPE/IFE INTERPRETATION: NORMAL

## 2023-05-25 LAB — ANA SER QL IA: NEGATIVE

## 2023-05-26 LAB — VIT B1 SERPL-MCNC: 16 NMOL/L (ref 4–15)

## 2023-05-31 ENCOUNTER — HOSPITAL ENCOUNTER (OUTPATIENT)
Dept: MRI IMAGING | Age: 68
Discharge: HOME | End: 2023-05-31
Payer: COMMERCIAL

## 2023-05-31 DIAGNOSIS — M54.17 RADICULITIS, LUMBOSACRAL: ICD-10-CM

## 2023-05-31 DIAGNOSIS — M21.372 LEFT FOOT DROP: ICD-10-CM

## 2023-05-31 PROCEDURE — 72148 MRI LUMBAR SPINE W/O DYE: CPT

## 2023-06-06 ENCOUNTER — PATIENT MESSAGE (OUTPATIENT)
Dept: FAMILY MEDICINE CLINIC | Age: 68
End: 2023-06-06

## 2023-06-06 DIAGNOSIS — M21.372 LEFT FOOT DROP: Primary | ICD-10-CM

## 2023-06-06 DIAGNOSIS — M48.061 SPINAL STENOSIS OF LUMBAR REGION, UNSPECIFIED WHETHER NEUROGENIC CLAUDICATION PRESENT: ICD-10-CM

## 2023-10-13 ENCOUNTER — TELEPHONE (OUTPATIENT)
Dept: FAMILY MEDICINE CLINIC | Age: 68
End: 2023-10-13

## 2023-10-13 DIAGNOSIS — Z00.00 ANNUAL PHYSICAL EXAM: Primary | ICD-10-CM

## 2023-10-13 DIAGNOSIS — Z12.5 SCREENING FOR PROSTATE CANCER: ICD-10-CM

## 2023-10-13 NOTE — TELEPHONE ENCOUNTER
Patient stopped by office today to schedule his physical and stated he would like to come in a few days in advance to get his blood work done. He requested Dr. Ky Lee put an order in. Please Advise.

## 2023-11-06 DIAGNOSIS — Z12.5 SCREENING FOR PROSTATE CANCER: ICD-10-CM

## 2023-11-06 DIAGNOSIS — Z00.00 ANNUAL PHYSICAL EXAM: ICD-10-CM

## 2023-11-07 LAB
ALBUMIN SERPL-MCNC: 4.3 G/DL (ref 3.4–5)
ALBUMIN/GLOB SERPL: 2 {RATIO} (ref 1.1–2.2)
ALP SERPL-CCNC: 91 U/L (ref 40–129)
ALT SERPL-CCNC: 7 U/L (ref 10–40)
ANION GAP SERPL CALCULATED.3IONS-SCNC: 11 MMOL/L (ref 3–16)
AST SERPL-CCNC: 22 U/L (ref 15–37)
BILIRUB SERPL-MCNC: 0.5 MG/DL (ref 0–1)
BUN SERPL-MCNC: 14 MG/DL (ref 7–20)
CALCIUM SERPL-MCNC: 9.2 MG/DL (ref 8.3–10.6)
CHLORIDE SERPL-SCNC: 101 MMOL/L (ref 99–110)
CHOLEST SERPL-MCNC: 177 MG/DL (ref 0–199)
CO2 SERPL-SCNC: 27 MMOL/L (ref 21–32)
CREAT SERPL-MCNC: 1 MG/DL (ref 0.8–1.3)
GFR SERPLBLD CREATININE-BSD FMLA CKD-EPI: >60 ML/MIN/{1.73_M2}
GLUCOSE SERPL-MCNC: 94 MG/DL (ref 70–99)
HDLC SERPL-MCNC: 58 MG/DL (ref 40–60)
LDLC SERPL CALC-MCNC: 107 MG/DL
POTASSIUM SERPL-SCNC: 4.3 MMOL/L (ref 3.5–5.1)
PROT SERPL-MCNC: 6.5 G/DL (ref 6.4–8.2)
PSA SERPL DL<=0.01 NG/ML-MCNC: 0.74 NG/ML (ref 0–4)
SODIUM SERPL-SCNC: 139 MMOL/L (ref 136–145)
TRIGL SERPL-MCNC: 60 MG/DL (ref 0–150)
VLDLC SERPL CALC-MCNC: 12 MG/DL

## 2023-11-08 ENCOUNTER — OFFICE VISIT (OUTPATIENT)
Dept: FAMILY MEDICINE CLINIC | Age: 68
End: 2023-11-08

## 2023-11-08 VITALS
DIASTOLIC BLOOD PRESSURE: 86 MMHG | SYSTOLIC BLOOD PRESSURE: 136 MMHG | WEIGHT: 203 LBS | HEIGHT: 72 IN | HEART RATE: 76 BPM | RESPIRATION RATE: 16 BRPM | TEMPERATURE: 97.1 F | OXYGEN SATURATION: 98 % | BODY MASS INDEX: 27.5 KG/M2

## 2023-11-08 DIAGNOSIS — Z00.00 ANNUAL PHYSICAL EXAM: Primary | ICD-10-CM

## 2023-11-08 DIAGNOSIS — Z23 NEEDS FLU SHOT: ICD-10-CM

## 2023-11-08 SDOH — ECONOMIC STABILITY: FOOD INSECURITY: WITHIN THE PAST 12 MONTHS, YOU WORRIED THAT YOUR FOOD WOULD RUN OUT BEFORE YOU GOT MONEY TO BUY MORE.: NEVER TRUE

## 2023-11-08 SDOH — ECONOMIC STABILITY: INCOME INSECURITY: HOW HARD IS IT FOR YOU TO PAY FOR THE VERY BASICS LIKE FOOD, HOUSING, MEDICAL CARE, AND HEATING?: NOT HARD AT ALL

## 2023-11-08 SDOH — ECONOMIC STABILITY: FOOD INSECURITY: WITHIN THE PAST 12 MONTHS, THE FOOD YOU BOUGHT JUST DIDN'T LAST AND YOU DIDN'T HAVE MONEY TO GET MORE.: NEVER TRUE

## 2023-11-08 ASSESSMENT — PATIENT HEALTH QUESTIONNAIRE - PHQ9
SUM OF ALL RESPONSES TO PHQ QUESTIONS 1-9: 0
SUM OF ALL RESPONSES TO PHQ QUESTIONS 1-9: 0
SUM OF ALL RESPONSES TO PHQ9 QUESTIONS 1 & 2: 0
SUM OF ALL RESPONSES TO PHQ QUESTIONS 1-9: 0
2. FEELING DOWN, DEPRESSED OR HOPELESS: 0
1. LITTLE INTEREST OR PLEASURE IN DOING THINGS: 0
SUM OF ALL RESPONSES TO PHQ QUESTIONS 1-9: 0

## 2023-11-08 ASSESSMENT — ENCOUNTER SYMPTOMS
GASTROINTESTINAL NEGATIVE: 1
RESPIRATORY NEGATIVE: 1
SORE THROAT: 0
TROUBLE SWALLOWING: 0

## 2023-11-08 NOTE — PROGRESS NOTES
Subjective:      Patient ID: Francie Chowdary is a 79 y.o. y.o. male. Here for physical.    Meds and hx reviewed. There is no interval hx of hospitalization or significant illness    Has been generally well.       HPI      Chief Complaint   Patient presents with    Annual Exam     Had blood work 11/6/23       Allergies   Allergen Reactions    Naprosyn [Naproxen] Rash       Past Medical History:   Diagnosis Date    Allergic rhinitis     Arthritis     Osteoarthritis        Past Surgical History:   Procedure Laterality Date    ARTHROPLASTY Right 10/28/2022    RIGHT WRIST PROXIMAL ROW CARPECTOMY WITH PIN NEURECTOMY, RELEASE RIGHT TRIGGER THUMB, PROXIMAL STYLOIDECTOMY performed by Ezra Nunez MD at [facility]      rt wrist  age 27    [address] [facility] Right 03/14/2017    CARPAL TUNNEL RELEASE Left 04/11/2017    with excision volar wrist ganglion    COLONOSCOPY  2008    no polyps    COLONOSCOPY  02/26/2018    diverticulosis    FRACTURE SURGERY  1977    bone graft, right wrist    HAND SURGERY Left 12/12/2017    Proximal row carpectomy    HERNIA REPAIR Bilateral 08/23/2018     [address]OBOTIC [address] REPLACEMENT  2008    rt total hip    KNEE ARTHROPLASTY  2022    Left    KNEE ARTHROSCOPY Left 08/20/2014    LEFT KNEE ARTHROSCOPY, CHONDROPLASTY, SYNOVECTOMY, PARTIAL    [place] RPR 1ST INGUN HRNA AGE 5 YRS/> REDUCIBLE Bilateral 08/23/2018    ROBOTIC INGUINAL HERNIA REPAIR WITH MESH performed by Ivan Mims MD at [address] [facility] ARTHROSCOPY Left 10/28/2014    debridement,rotator cuff repair    TONSILLECTOMY AND ADENOIDECTOMY Bilateral 1960    TOTAL HIP ARTHROPLASTY  2008    Right    TOTAL KNEE ARTHROPLASTY Left 03/28/2022    LEFT TOTAL KNEE ARTHROPLASTY - COMPLEX WITH ADDUCTOR CANAL BLOCK                         MEDACTA performed by Anamika Huggins MD at [facility] History     Socioeconomic History    Marital status: Single     Spouse name: Not on file

## 2024-01-17 RX ORDER — AMOXICILLIN AND CLAVULANATE POTASSIUM 875; 125 MG/1; MG/1
TABLET, FILM COATED ORAL
Qty: 6 TABLET | Refills: 3 | Status: SHIPPED | OUTPATIENT
Start: 2024-01-17

## 2024-11-06 ASSESSMENT — PATIENT HEALTH QUESTIONNAIRE - PHQ9
SUM OF ALL RESPONSES TO PHQ QUESTIONS 1-9: 0
2. FEELING DOWN, DEPRESSED OR HOPELESS: NOT AT ALL
2. FEELING DOWN, DEPRESSED OR HOPELESS: NOT AT ALL
SUM OF ALL RESPONSES TO PHQ QUESTIONS 1-9: 0
SUM OF ALL RESPONSES TO PHQ9 QUESTIONS 1 & 2: 0
1. LITTLE INTEREST OR PLEASURE IN DOING THINGS: NOT AT ALL
SUM OF ALL RESPONSES TO PHQ QUESTIONS 1-9: 0
SUM OF ALL RESPONSES TO PHQ QUESTIONS 1-9: 0
1. LITTLE INTEREST OR PLEASURE IN DOING THINGS: NOT AT ALL
SUM OF ALL RESPONSES TO PHQ9 QUESTIONS 1 & 2: 0

## 2024-11-07 ENCOUNTER — TELEPHONE (OUTPATIENT)
Dept: FAMILY MEDICINE CLINIC | Age: 69
End: 2024-11-07

## 2024-11-07 DIAGNOSIS — Z00.00 ANNUAL PHYSICAL EXAM: Primary | ICD-10-CM

## 2024-11-11 DIAGNOSIS — Z00.00 ANNUAL PHYSICAL EXAM: ICD-10-CM

## 2024-11-11 LAB
ALBUMIN SERPL-MCNC: 4.7 G/DL (ref 3.4–5)
ALBUMIN/GLOB SERPL: 2.6 {RATIO} (ref 1.1–2.2)
ALP SERPL-CCNC: 67 U/L (ref 40–129)
ALT SERPL-CCNC: 8 U/L (ref 10–40)
ANION GAP SERPL CALCULATED.3IONS-SCNC: 12 MMOL/L (ref 3–16)
AST SERPL-CCNC: 19 U/L (ref 15–37)
BASOPHILS # BLD: 0 K/UL (ref 0–0.2)
BASOPHILS NFR BLD: 0.7 %
BILIRUB SERPL-MCNC: 0.5 MG/DL (ref 0–1)
BUN SERPL-MCNC: 18 MG/DL (ref 7–20)
CALCIUM SERPL-MCNC: 9.1 MG/DL (ref 8.3–10.6)
CHLORIDE SERPL-SCNC: 100 MMOL/L (ref 99–110)
CHOLEST SERPL-MCNC: 188 MG/DL (ref 0–199)
CO2 SERPL-SCNC: 24 MMOL/L (ref 21–32)
CREAT SERPL-MCNC: 1 MG/DL (ref 0.8–1.3)
DEPRECATED RDW RBC AUTO: 12.8 % (ref 12.4–15.4)
EOSINOPHIL # BLD: 0.3 K/UL (ref 0–0.6)
EOSINOPHIL NFR BLD: 5.9 %
GFR SERPLBLD CREATININE-BSD FMLA CKD-EPI: 81 ML/MIN/{1.73_M2}
GLUCOSE SERPL-MCNC: 92 MG/DL (ref 70–99)
HCT VFR BLD AUTO: 44 % (ref 40.5–52.5)
HDLC SERPL-MCNC: 58 MG/DL (ref 40–60)
HGB BLD-MCNC: 15.2 G/DL (ref 13.5–17.5)
LDLC SERPL CALC-MCNC: 118 MG/DL
LYMPHOCYTES # BLD: 1.8 K/UL (ref 1–5.1)
LYMPHOCYTES NFR BLD: 31.9 %
MCH RBC QN AUTO: 32.2 PG (ref 26–34)
MCHC RBC AUTO-ENTMCNC: 34.6 G/DL (ref 31–36)
MCV RBC AUTO: 93 FL (ref 80–100)
MONOCYTES # BLD: 0.5 K/UL (ref 0–1.3)
MONOCYTES NFR BLD: 8.4 %
NEUTROPHILS # BLD: 3.1 K/UL (ref 1.7–7.7)
NEUTROPHILS NFR BLD: 53.1 %
PLATELET # BLD AUTO: 343 K/UL (ref 135–450)
PMV BLD AUTO: 8.1 FL (ref 5–10.5)
POTASSIUM SERPL-SCNC: 4.6 MMOL/L (ref 3.5–5.1)
PROT SERPL-MCNC: 6.5 G/DL (ref 6.4–8.2)
PSA SERPL DL<=0.01 NG/ML-MCNC: 0.86 NG/ML (ref 0–4)
RBC # BLD AUTO: 4.74 M/UL (ref 4.2–5.9)
SODIUM SERPL-SCNC: 136 MMOL/L (ref 136–145)
TRIGL SERPL-MCNC: 60 MG/DL (ref 0–150)
VLDLC SERPL CALC-MCNC: 12 MG/DL
WBC # BLD AUTO: 5.7 K/UL (ref 4–11)

## 2024-11-13 ENCOUNTER — OFFICE VISIT (OUTPATIENT)
Dept: FAMILY MEDICINE CLINIC | Age: 69
End: 2024-11-13
Payer: COMMERCIAL

## 2024-11-13 VITALS
RESPIRATION RATE: 16 BRPM | DIASTOLIC BLOOD PRESSURE: 90 MMHG | HEART RATE: 82 BPM | SYSTOLIC BLOOD PRESSURE: 160 MMHG | HEIGHT: 72 IN | TEMPERATURE: 97.3 F | WEIGHT: 207 LBS | OXYGEN SATURATION: 97 % | BODY MASS INDEX: 28.04 KG/M2

## 2024-11-13 DIAGNOSIS — G61.82 MULTIFOCAL MOTOR NEUROPATHY (HCC): ICD-10-CM

## 2024-11-13 DIAGNOSIS — M21.372 LEFT FOOT DROP: ICD-10-CM

## 2024-11-13 DIAGNOSIS — Z00.00 ANNUAL PHYSICAL EXAM: Primary | ICD-10-CM

## 2024-11-13 PROCEDURE — 99397 PER PM REEVAL EST PAT 65+ YR: CPT | Performed by: FAMILY MEDICINE

## 2024-11-13 SDOH — ECONOMIC STABILITY: FOOD INSECURITY: WITHIN THE PAST 12 MONTHS, YOU WORRIED THAT YOUR FOOD WOULD RUN OUT BEFORE YOU GOT MONEY TO BUY MORE.: NEVER TRUE

## 2024-11-13 SDOH — ECONOMIC STABILITY: INCOME INSECURITY: HOW HARD IS IT FOR YOU TO PAY FOR THE VERY BASICS LIKE FOOD, HOUSING, MEDICAL CARE, AND HEATING?: NOT HARD AT ALL

## 2024-11-13 SDOH — ECONOMIC STABILITY: FOOD INSECURITY: WITHIN THE PAST 12 MONTHS, THE FOOD YOU BOUGHT JUST DIDN'T LAST AND YOU DIDN'T HAVE MONEY TO GET MORE.: NEVER TRUE

## 2024-11-13 ASSESSMENT — ENCOUNTER SYMPTOMS
RESPIRATORY NEGATIVE: 1
BLOOD IN STOOL: 0

## 2024-11-13 NOTE — PATIENT INSTRUCTIONS
Cut back fats in the diet a bit    Be careful of salt and caffeine in diet-  BP    Consider a BP home cuff  goal less than 140-145 on the top and less than 85 on the bottom ( diastolic )    See me 6 months

## 2024-11-13 NOTE — PROGRESS NOTES
Subjective:      Patient ID: Oracio Batista is a 68 y.o. y.o. male.  Here for annual    Meds and history reviewed  No interval history of hospitalization        Ankle Pain     Right side.  No trauma.  Not debilitating but noticeable.      Chief Complaint   Patient presents with    Annual Exam     Had blood work 11/11/24    Ankle Pain     R - drop foot - fell 10/4/24 in back yard - still swollen       Allergies   Allergen Reactions    Naprosyn [Naproxen] Rash       Past Medical History:   Diagnosis Date    Allergic rhinitis     Arthritis     Osteoarthritis        Past Surgical History:   Procedure Laterality Date    ARTHROPLASTY Right 10/28/2022    RIGHT WRIST PROXIMAL ROW CARPECTOMY WITH PIN NEURECTOMY, RELEASE RIGHT TRIGGER THUMB, PROXIMAL STYLOIDECTOMY performed by Issac Hinkle MD at [facility]    BONE GRAFT      rt wrist  age 30    CARPAL TUNNEL RELEASE Right 03/14/2017    CARPAL TUNNEL RELEASE Left 04/11/2017    with excision volar wrist ganglion    COLONOSCOPY  2008    no polyps    COLONOSCOPY  02/26/2018    diverticulosis    FRACTURE SURGERY  1977    bone graft, right wrist    HAND SURGERY Left 12/12/2017    Proximal row carpectomy    HERNIA REPAIR Bilateral 08/23/2018     ROBOTIC INGUINAL HERNIA REPAIR WITH MESH    JOINT REPLACEMENT  2008    rt total hip    KNEE ARTHROPLASTY  2022    Left    KNEE ARTHROSCOPY Left 08/20/2014    LEFT KNEE ARTHROSCOPY, CHONDROPLASTY, SYNOVECTOMY, PARTIAL    [place] RPR 1ST INGUN HRNA AGE 5 YRS/> REDUCIBLE Bilateral 08/23/2018    ROBOTIC INGUINAL HERNIA REPAIR WITH MESH performed by Tomer Cassidy MD at [facility]    SHOULDER ARTHROSCOPY Left 10/28/2014    debridement,rotator cuff repair    TONSILLECTOMY AND ADENOIDECTOMY Bilateral 1960    TOTAL HIP ARTHROPLASTY  2008    Right    TOTAL KNEE ARTHROPLASTY Left 03/28/2022    LEFT TOTAL KNEE ARTHROPLASTY - COMPLEX WITH ADDUCTOR CANAL BLOCK                         MEDACTA performed by Arvind Kline MD at [facility] OR

## 2024-11-17 PROBLEM — G61.82 MULTIFOCAL MOTOR NEUROPATHY (HCC): Status: ACTIVE | Noted: 2024-11-17

## 2025-06-15 SDOH — ECONOMIC STABILITY: FOOD INSECURITY: WITHIN THE PAST 12 MONTHS, YOU WORRIED THAT YOUR FOOD WOULD RUN OUT BEFORE YOU GOT MONEY TO BUY MORE.: NEVER TRUE

## 2025-06-15 SDOH — ECONOMIC STABILITY: FOOD INSECURITY: WITHIN THE PAST 12 MONTHS, THE FOOD YOU BOUGHT JUST DIDN'T LAST AND YOU DIDN'T HAVE MONEY TO GET MORE.: NEVER TRUE

## 2025-06-15 SDOH — ECONOMIC STABILITY: INCOME INSECURITY: IN THE LAST 12 MONTHS, WAS THERE A TIME WHEN YOU WERE NOT ABLE TO PAY THE MORTGAGE OR RENT ON TIME?: NO

## 2025-06-15 ASSESSMENT — PATIENT HEALTH QUESTIONNAIRE - PHQ9
2. FEELING DOWN, DEPRESSED OR HOPELESS: NOT AT ALL
1. LITTLE INTEREST OR PLEASURE IN DOING THINGS: NOT AT ALL
SUM OF ALL RESPONSES TO PHQ QUESTIONS 1-9: 0
SUM OF ALL RESPONSES TO PHQ9 QUESTIONS 1 & 2: 0
SUM OF ALL RESPONSES TO PHQ QUESTIONS 1-9: 0
2. FEELING DOWN, DEPRESSED OR HOPELESS: NOT AT ALL
SUM OF ALL RESPONSES TO PHQ QUESTIONS 1-9: 0
1. LITTLE INTEREST OR PLEASURE IN DOING THINGS: NOT AT ALL
SUM OF ALL RESPONSES TO PHQ QUESTIONS 1-9: 0

## 2025-06-18 ENCOUNTER — OFFICE VISIT (OUTPATIENT)
Dept: FAMILY MEDICINE CLINIC | Age: 70
End: 2025-06-18

## 2025-06-18 VITALS
BODY MASS INDEX: 28.71 KG/M2 | WEIGHT: 212 LBS | HEIGHT: 72 IN | HEART RATE: 100 BPM | OXYGEN SATURATION: 97 % | DIASTOLIC BLOOD PRESSURE: 88 MMHG | SYSTOLIC BLOOD PRESSURE: 132 MMHG

## 2025-06-18 DIAGNOSIS — H91.90 HEARING LOSS, UNSPECIFIED HEARING LOSS TYPE, UNSPECIFIED LATERALITY: ICD-10-CM

## 2025-06-18 DIAGNOSIS — G61.82 MULTIFOCAL MOTOR NEUROPATHY (HCC): Primary | ICD-10-CM

## 2025-06-18 ASSESSMENT — ENCOUNTER SYMPTOMS: RESPIRATORY NEGATIVE: 1

## 2025-06-18 NOTE — PROGRESS NOTES
Subjective:      Patient ID: Oracio Batista is a 69 y.o. y.o. male.    Following up BP    Has been well.  Monitoring BP and numbers better.    There is no interval hx of hospitalization or significant illness      Hypertension          Chief Complaint   Patient presents with    6 Month Follow-Up    Hypertension     Patient reported record of BP       Allergies   Allergen Reactions    Naprosyn [Naproxen] Rash       Past Medical History:   Diagnosis Date    Allergic rhinitis     Arthritis     Osteoarthritis        Past Surgical History:   Procedure Laterality Date    ARTHROPLASTY Right 10/28/2022    RIGHT WRIST PROXIMAL ROW CARPECTOMY WITH PIN NEURECTOMY, RELEASE RIGHT TRIGGER THUMB, PROXIMAL STYLOIDECTOMY performed by Issac Hinkle MD at [facility]    BONE GRAFT      rt wrist  age 30    CARPAL TUNNEL RELEASE Right 03/14/2017    CARPAL TUNNEL RELEASE Left 04/11/2017    with excision volar wrist ganglion    COLONOSCOPY  2008    no polyps    COLONOSCOPY  02/26/2018    diverticulosis    FRACTURE SURGERY  1977    bone graft, right wrist    HAND SURGERY Left 12/12/2017    Proximal row carpectomy    HERNIA REPAIR Bilateral 08/23/2018     ROBOTIC INGUINAL HERNIA REPAIR WITH MESH    JOINT REPLACEMENT  2008    rt total hip    KNEE ARTHROPLASTY  2022    Left    KNEE ARTHROSCOPY Left 08/20/2014    LEFT KNEE ARTHROSCOPY, CHONDROPLASTY, SYNOVECTOMY, PARTIAL    [place] RPR 1ST INGUN HRNA AGE 5 YRS/> REDUCIBLE Bilateral 08/23/2018    ROBOTIC INGUINAL HERNIA REPAIR WITH MESH performed by Tomer Cassidy MD at [facility]    SHOULDER ARTHROSCOPY Left 10/28/2014    debridement,rotator cuff repair    TONSILLECTOMY AND ADENOIDECTOMY Bilateral 1960    TOTAL HIP ARTHROPLASTY  2008    Right    TOTAL KNEE ARTHROPLASTY Left 03/28/2022    LEFT TOTAL KNEE ARTHROPLASTY - COMPLEX WITH ADDUCTOR CANAL BLOCK                         MEDACTA performed by Arvind Kline MD at [facility] OR       Social History     Socioeconomic History    Marital

## 2025-06-18 NOTE — PATIENT INSTRUCTIONS
Continue the current medication and therapy program    Stay active to tolerance        Keep your follow up appointment

## 2025-08-27 ENCOUNTER — OFFICE VISIT (OUTPATIENT)
Dept: ENT CLINIC | Age: 70
End: 2025-08-27
Payer: COMMERCIAL

## 2025-08-27 ENCOUNTER — PROCEDURE VISIT (OUTPATIENT)
Dept: AUDIOLOGY | Age: 70
End: 2025-08-27
Payer: COMMERCIAL

## 2025-08-27 VITALS
HEIGHT: 72 IN | SYSTOLIC BLOOD PRESSURE: 159 MMHG | DIASTOLIC BLOOD PRESSURE: 99 MMHG | BODY MASS INDEX: 28.31 KG/M2 | HEART RATE: 71 BPM | WEIGHT: 209 LBS

## 2025-08-27 DIAGNOSIS — H90.3 SENSORINEURAL HEARING LOSS (SNHL) OF BOTH EARS: Primary | ICD-10-CM

## 2025-08-27 DIAGNOSIS — H90.3 SENSORINEURAL HEARING LOSS, BILATERAL: Primary | ICD-10-CM

## 2025-08-27 PROCEDURE — 92557 COMPREHENSIVE HEARING TEST: CPT | Performed by: AUDIOLOGIST

## 2025-08-27 PROCEDURE — 92567 TYMPANOMETRY: CPT | Performed by: AUDIOLOGIST

## 2025-08-27 PROCEDURE — 1123F ACP DISCUSS/DSCN MKR DOCD: CPT | Performed by: OTOLARYNGOLOGY

## 2025-08-27 PROCEDURE — 99203 OFFICE O/P NEW LOW 30 MIN: CPT | Performed by: OTOLARYNGOLOGY

## 2025-08-27 ASSESSMENT — ENCOUNTER SYMPTOMS
VOICE CHANGE: 0
APNEA: 0
SINUS PRESSURE: 0
COUGH: 0
TROUBLE SWALLOWING: 0
FACIAL SWELLING: 0
EYE ITCHING: 0
SORE THROAT: 0
SHORTNESS OF BREATH: 0

## (undated) DEVICE — SOLUTION,SALINE,IRRGATION,500ML,STRL: Brand: MEDLINE

## (undated) DEVICE — HOOD, PEEL-AWAY: Brand: FLYTE

## (undated) DEVICE — CT LEFT MEDIAL #6: Brand: MY KNEE FEMORAL CUTTING BLOCKS

## (undated) DEVICE — 3M™ COBAN™ SELF-ADHERENT WRAP, 1586S, STERILE, 6 IN X 5 YD (15 CM X 4,5 M), 12 ROLLS/CASE: Brand: 3M™ COBAN™

## (undated) DEVICE — OBTURATOR ROBOTIC DIA8MM BLDELSS ENDOSCP DISP DA VINCI SI

## (undated) DEVICE — SURGIFOAM SPNG SZ 12-7

## (undated) DEVICE — SUTURE COAT VCRL SZ 4-0 L18IN ABSRB UD L19MM PS-2 1/2 CIR J496G

## (undated) DEVICE — PENCIL SMK EVAC ALL IN 1 DSGN ENH VISIBILITY IMPROVED AIR

## (undated) DEVICE — COTTON UNDERCAST PADDING,CRIMPED FINISH: Brand: WEBRIL

## (undated) DEVICE — ROYAL SILK SURGICAL GOWN, XL: Brand: CONVERTORS

## (undated) DEVICE — SUTURE ETHBND EXCEL SZ 0 L18IN NONABSORBABLE GRN L22MM MO-7 CX41D

## (undated) DEVICE — GLOVE SURG SZ 8 L12IN FNGR THK79MIL GRN LTX FREE

## (undated) DEVICE — SUTURE ABSORBABLE MONOFILAMENT 2-0 SH 6 IN STRATAFIX SPRL SXPP1B415

## (undated) DEVICE — SYSTEM SKIN CLSR 22CM DERMBND PRINEO

## (undated) DEVICE — COVER,TABLE,HEAVY DUTY,50"X90",STRL: Brand: MEDLINE

## (undated) DEVICE — OPTIFOAM GENTLE SA, POSTOP, 4X12: Brand: MEDLINE

## (undated) DEVICE — HANDPIECE SET WITH HIGH FLOW TIP AND SUCTION TUBE: Brand: INTERPULSE

## (undated) DEVICE — GAUZE SPONGES,8 PLY: Brand: CURITY

## (undated) DEVICE — CIRCUIT ANES L72IN 3L BACT AND VIR FLTR EL CONN SGL LIMB

## (undated) DEVICE — 3M™ STERI-STRIP™ REINFORCED ADHESIVE SKIN CLOSURES, R1540, 1/8 IN X 3 IN (3 MM X 75 MM), 5 STRIPS/ENVELOPE: Brand: 3M™ STERI-STRIP™

## (undated) DEVICE — 3M™ STERI-STRIP™ COMPOUND BENZOIN TINCTURE 40 BAGS/CARTON 4 CARTONS/CASE C1544: Brand: 3M™ STERI-STRIP™

## (undated) DEVICE — 3M™ TEGADERM™ TRANSPARENT FILM DRESSING FRAME STYLE, 1624W, 2-3/8 IN X 2-3/4 IN (6 CM X 7 CM), 100/CT 4CT/CASE: Brand: 3M™ TEGADERM™

## (undated) DEVICE — CT LEFT MEDIAL #6: Brand: MY KNEE TIBIAL CUTTING BLOCKS

## (undated) DEVICE — SUTURE MCRYL SZ 3-0 L27IN ABSRB UD L19MM PS-2 3/8 CIR PRIM Y427H

## (undated) DEVICE — GLOVE ORANGE PI 7 1/2   MSG9075

## (undated) DEVICE — SUTURE STRATAFIX SPRL SZ 1 L14IN ABSRB VLT L48CM CTX 1/2 SXPD2B405

## (undated) DEVICE — STERILE PATIENT PROTECTIVE PAD FOR IMP® KNEE POSITIONERS & COHESIVE WRAP (10 / CASE): Brand: DE MAYO KNEE POSITIONER®

## (undated) DEVICE — SUTURE VCRL + SZ 2-0 L27IN ABSRB WHT SH 1/2 CIR TAPERCUT VCP417H

## (undated) DEVICE — GOWN SIRUS NONREIN XL W/TWL: Brand: MEDLINE INDUSTRIES, INC.

## (undated) DEVICE — BLADE OPHTH 180DEG CUT SURF BLU STR SHRP DBL BVL GRINDLESS

## (undated) DEVICE — TIP COVER ACCESSORY

## (undated) DEVICE — Device

## (undated) DEVICE — SUTURE STRATAFIX SPRL SZ 2 0 L5IN ABSRB VLT SH L26MM 1 2 CIR SXPD2B414

## (undated) DEVICE — ZIMMER® A.T.S. CYCLINDRICAL TOURNIQUET CUFF, SINGLE PORT, SINGLE BLADDER 30 IN. 76 CM)

## (undated) DEVICE — STERILE LATEX POWDER-FREE SURGICAL GLOVESWITH NITRILE COATING: Brand: PROTEXIS

## (undated) DEVICE — KIT CATH EXP NDL L6IN SOAK CATH L5IN T PEEL ON-Q SILVERSOAK

## (undated) DEVICE — SYRINGE MED 30ML STD CLR PLAS LUERLOCK TIP N CTRL DISP

## (undated) DEVICE — STANDARD HYPODERMIC NEEDLE,POLYPROPYLENE HUB: Brand: MONOJECT

## (undated) DEVICE — SUTURE ORTHOCORD SZ 2-0 VLT BLU MO-7 NDL MULTIPAK 223114

## (undated) DEVICE — STERILE POLYISOPRENE POWDER-FREE SURGICAL GLOVES: Brand: PROTEXIS

## (undated) DEVICE — SPLINT ORTH W3XL12IN LAYERED FBRGLS FOAM PD BRTH BK MOLD

## (undated) DEVICE — SPLINT ORTH CLOSED PAT UNIV 20 IN BLK PROCARE QUICK-FIT

## (undated) DEVICE — KIT DRP 3 ARM ACC DISP ENDOWRIST DA VINCI SI

## (undated) DEVICE — SOLUTION IV IRRIG POUR BRL 0.9% SODIUM CHL 2F7124

## (undated) DEVICE — BOWL AND CEMENT CARTRIDGE WITH BREAKAWAY FEMORAL NOZZLE AND MEDIUM PRESSURIZER: Brand: ACM

## (undated) DEVICE — BANDAGE COMPR W3XL180IN TAN COT E SGL LAYR EC GRD W/ CLP

## (undated) DEVICE — SUTURE ETHLN SZ 4-0 L18IN NONABSORBABLE BLK L19MM PS-2 3/8 1667H

## (undated) DEVICE — TUBING, SUCTION, 3/16" X 10', STRAIGHT: Brand: MEDLINE

## (undated) DEVICE — SYRINGE, LUER LOCK, 10ML: Brand: MEDLINE

## (undated) DEVICE — 2108 SERIES SAGITTAL BLADE, NO OFFSET  (12.4 X 1.19 X 82.1MM)

## (undated) DEVICE — GLOVE SURG SZ 7 L12IN FNGR THK94MIL STD WHT ISOLEX LTX FREE

## (undated) DEVICE — SUTURE VCRL + SZ 2-0 L18IN ABSRB UD CT1 L36MM 1/2 CIR VCP839D

## (undated) DEVICE — TROCAR ENDOSCP L100MM DIA12MM BLNT STBL SL DISP ENDOPATH

## (undated) DEVICE — SUTURE VCRL SZ 4-0 L18IN ABSRB UD L19MM PS-2 3/8 CIR PRIM J496H

## (undated) DEVICE — BOOT POS LEG DEMAYO

## (undated) DEVICE — GAUZE,SPONGE,2"X2",8PLY,STERILE,LF,2'S: Brand: MEDLINE

## (undated) DEVICE — NEEDLE SPNL 20GA L3.5IN YEL HUB S STL REG WALL FIT STYL W/

## (undated) DEVICE — ADHESIVE SKIN CLSR 0.7ML TOP DERMBND ADV

## (undated) DEVICE — SOLUTION IRRIG 2000ML 0.9% SOD CHL USP UROMATIC PLAS CONT

## (undated) DEVICE — COVER C ARM MINI

## (undated) DEVICE — SUTURE ETHBND EXCEL SZ 2 L30IN NONABSORBABLE GRN L40MM V-37 MX69G